# Patient Record
Sex: FEMALE | Race: WHITE | NOT HISPANIC OR LATINO | URBAN - METROPOLITAN AREA
[De-identification: names, ages, dates, MRNs, and addresses within clinical notes are randomized per-mention and may not be internally consistent; named-entity substitution may affect disease eponyms.]

---

## 2018-02-14 ENCOUNTER — INPATIENT (INPATIENT)
Facility: HOSPITAL | Age: 58
LOS: 6 days | Discharge: HOME | End: 2018-02-21
Attending: HOSPITALIST

## 2018-02-14 VITALS
HEART RATE: 84 BPM | DIASTOLIC BLOOD PRESSURE: 60 MMHG | TEMPERATURE: 99 F | RESPIRATION RATE: 18 BRPM | SYSTOLIC BLOOD PRESSURE: 110 MMHG

## 2018-02-15 DIAGNOSIS — Z98.890 OTHER SPECIFIED POSTPROCEDURAL STATES: Chronic | ICD-10-CM

## 2018-02-15 DIAGNOSIS — K52.9 NONINFECTIVE GASTROENTERITIS AND COLITIS, UNSPECIFIED: ICD-10-CM

## 2018-02-15 DIAGNOSIS — D69.6 THROMBOCYTOPENIA, UNSPECIFIED: ICD-10-CM

## 2018-02-15 DIAGNOSIS — K74.60 UNSPECIFIED CIRRHOSIS OF LIVER: ICD-10-CM

## 2018-02-15 LAB
ALBUMIN SERPL ELPH-MCNC: 2.9 G/DL — LOW (ref 3–5.5)
ALP SERPL-CCNC: 79 U/L — SIGNIFICANT CHANGE UP (ref 30–115)
ALT FLD-CCNC: 31 U/L — SIGNIFICANT CHANGE UP (ref 0–41)
ANION GAP SERPL CALC-SCNC: 5 MMOL/L — LOW (ref 7–14)
APTT BLD: 37.3 SEC — SIGNIFICANT CHANGE UP (ref 27–39.2)
AST SERPL-CCNC: 62 U/L — HIGH (ref 0–41)
BILIRUB SERPL-MCNC: 1.3 MG/DL — HIGH (ref 0.2–1.2)
BUN SERPL-MCNC: 8 MG/DL — LOW (ref 10–20)
CALCIUM SERPL-MCNC: 8.2 MG/DL — LOW (ref 8.5–10.1)
CHLORIDE SERPL-SCNC: 108 MMOL/L — SIGNIFICANT CHANGE UP (ref 98–110)
CO2 SERPL-SCNC: 20 MMOL/L — SIGNIFICANT CHANGE UP (ref 17–32)
CREAT SERPL-MCNC: 0.7 MG/DL — SIGNIFICANT CHANGE UP (ref 0.7–1.5)
D DIMER BLD IA.RAPID-MCNC: 541 NG/ML DDU — HIGH (ref 0–230)
FIBRINOGEN PPP-MCNC: 201 MG/DL — LOW (ref 204.4–570.6)
GLUCOSE SERPL-MCNC: 95 MG/DL — SIGNIFICANT CHANGE UP (ref 70–110)
HCT VFR BLD CALC: 28.2 % — LOW (ref 37–47)
HCT VFR BLD CALC: 30.2 % — LOW (ref 37–47)
HGB BLD-MCNC: 8.7 G/DL — LOW (ref 14–18)
HGB BLD-MCNC: 9.4 G/DL — LOW (ref 14–18)
INR BLD: 1.65 RATIO — HIGH (ref 0.65–1.3)
LIDOCAIN IGE QN: 66 U/L — HIGH (ref 7–60)
MCHC RBC-ENTMCNC: 28.5 PG — SIGNIFICANT CHANGE UP (ref 27–31)
MCHC RBC-ENTMCNC: 28.7 PG — SIGNIFICANT CHANGE UP (ref 27–31)
MCHC RBC-ENTMCNC: 30.9 G/DL — LOW (ref 32–37)
MCHC RBC-ENTMCNC: 31.1 G/DL — LOW (ref 32–37)
MCV RBC AUTO: 91.5 FL — HIGH (ref 81–91)
MCV RBC AUTO: 93.1 FL — HIGH (ref 81–91)
NRBC # BLD: 0 /100 WBCS — SIGNIFICANT CHANGE UP (ref 0–0)
NRBC # BLD: 0 /100 WBCS — SIGNIFICANT CHANGE UP (ref 0–0)
PLATELET # BLD AUTO: 14 K/UL — CRITICAL LOW (ref 130–400)
PLATELET # BLD AUTO: 24 K/UL — LOW (ref 130–400)
POTASSIUM SERPL-MCNC: 3.8 MMOL/L — SIGNIFICANT CHANGE UP (ref 3.5–5)
POTASSIUM SERPL-SCNC: 3.8 MMOL/L — SIGNIFICANT CHANGE UP (ref 3.5–5)
PROT SERPL-MCNC: 9.8 G/DL — HIGH (ref 6–8)
PROTHROM AB SERPL-ACNC: 18 SEC — HIGH (ref 9.95–12.87)
RBC # BLD: 3.03 M/UL — LOW (ref 4.2–5.4)
RBC # BLD: 3.3 M/UL — LOW (ref 4.2–5.4)
RBC # FLD: 18.4 % — HIGH (ref 11.5–14.5)
RBC # FLD: 18.4 % — HIGH (ref 11.5–14.5)
SODIUM SERPL-SCNC: 133 MMOL/L — LOW (ref 135–146)
TYPE + AB SCN PNL BLD: SIGNIFICANT CHANGE UP
WBC # BLD: 2.36 K/UL — LOW (ref 4.8–10.8)
WBC # BLD: 3.36 K/UL — LOW (ref 4.8–10.8)
WBC # FLD AUTO: 2.36 K/UL — LOW (ref 4.8–10.8)
WBC # FLD AUTO: 3.36 K/UL — LOW (ref 4.8–10.8)

## 2018-02-15 RX ORDER — CIPROFLOXACIN LACTATE 400MG/40ML
400 VIAL (ML) INTRAVENOUS EVERY 12 HOURS
Qty: 0 | Refills: 0 | Status: DISCONTINUED | OUTPATIENT
Start: 2018-02-15 | End: 2018-02-15

## 2018-02-15 RX ORDER — CIPROFLOXACIN LACTATE 400MG/40ML
400 VIAL (ML) INTRAVENOUS ONCE
Qty: 0 | Refills: 0 | Status: DISCONTINUED | OUTPATIENT
Start: 2018-02-15 | End: 2018-02-15

## 2018-02-15 RX ORDER — METRONIDAZOLE 500 MG
TABLET ORAL
Qty: 0 | Refills: 0 | Status: DISCONTINUED | OUTPATIENT
Start: 2018-02-15 | End: 2018-02-20

## 2018-02-15 RX ORDER — URSODIOL 250 MG/1
1 TABLET, FILM COATED ORAL
Qty: 0 | Refills: 0 | COMMUNITY

## 2018-02-15 RX ORDER — URSODIOL 250 MG/1
300 TABLET, FILM COATED ORAL
Qty: 0 | Refills: 0 | Status: DISCONTINUED | OUTPATIENT
Start: 2018-02-15 | End: 2018-02-21

## 2018-02-15 RX ORDER — MORPHINE SULFATE 50 MG/1
4 CAPSULE, EXTENDED RELEASE ORAL
Qty: 0 | Refills: 0 | Status: DISCONTINUED | OUTPATIENT
Start: 2018-02-15 | End: 2018-02-16

## 2018-02-15 RX ORDER — CIPROFLOXACIN LACTATE 400MG/40ML
VIAL (ML) INTRAVENOUS
Qty: 0 | Refills: 0 | Status: DISCONTINUED | OUTPATIENT
Start: 2018-02-15 | End: 2018-02-15

## 2018-02-15 RX ORDER — METRONIDAZOLE 500 MG
500 TABLET ORAL EVERY 8 HOURS
Qty: 0 | Refills: 0 | Status: DISCONTINUED | OUTPATIENT
Start: 2018-02-15 | End: 2018-02-20

## 2018-02-15 RX ORDER — METRONIDAZOLE 500 MG
500 TABLET ORAL ONCE
Qty: 0 | Refills: 0 | Status: COMPLETED | OUTPATIENT
Start: 2018-02-15 | End: 2018-02-15

## 2018-02-15 RX ORDER — SODIUM CHLORIDE 9 MG/ML
1000 INJECTION INTRAMUSCULAR; INTRAVENOUS; SUBCUTANEOUS ONCE
Qty: 0 | Refills: 0 | Status: COMPLETED | OUTPATIENT
Start: 2018-02-15 | End: 2018-02-15

## 2018-02-15 RX ORDER — MORPHINE SULFATE 50 MG/1
4 CAPSULE, EXTENDED RELEASE ORAL ONCE
Qty: 0 | Refills: 0 | Status: DISCONTINUED | OUTPATIENT
Start: 2018-02-15 | End: 2018-02-15

## 2018-02-15 RX ORDER — CIPROFLOXACIN LACTATE 400MG/40ML
400 VIAL (ML) INTRAVENOUS ONCE
Qty: 0 | Refills: 0 | Status: COMPLETED | OUTPATIENT
Start: 2018-02-15 | End: 2018-02-15

## 2018-02-15 RX ORDER — CIPROFLOXACIN LACTATE 400MG/40ML
VIAL (ML) INTRAVENOUS
Qty: 0 | Refills: 0 | Status: DISCONTINUED | OUTPATIENT
Start: 2018-02-15 | End: 2018-02-20

## 2018-02-15 RX ORDER — PROPRANOLOL HCL 160 MG
20 CAPSULE, EXTENDED RELEASE 24HR ORAL
Qty: 0 | Refills: 0 | Status: DISCONTINUED | OUTPATIENT
Start: 2018-02-15 | End: 2018-02-21

## 2018-02-15 RX ORDER — CIPROFLOXACIN LACTATE 400MG/40ML
400 VIAL (ML) INTRAVENOUS EVERY 12 HOURS
Qty: 0 | Refills: 0 | Status: DISCONTINUED | OUTPATIENT
Start: 2018-02-15 | End: 2018-02-20

## 2018-02-15 RX ADMIN — URSODIOL 300 MILLIGRAM(S): 250 TABLET, FILM COATED ORAL at 18:26

## 2018-02-15 RX ADMIN — Medication 200 MILLIGRAM(S): at 18:26

## 2018-02-15 RX ADMIN — MORPHINE SULFATE 4 MILLIGRAM(S): 50 CAPSULE, EXTENDED RELEASE ORAL at 03:06

## 2018-02-15 RX ADMIN — MORPHINE SULFATE 4 MILLIGRAM(S): 50 CAPSULE, EXTENDED RELEASE ORAL at 02:04

## 2018-02-15 RX ADMIN — Medication 100 MILLIGRAM(S): at 12:19

## 2018-02-15 RX ADMIN — Medication 20 MILLIGRAM(S): at 18:27

## 2018-02-15 RX ADMIN — SODIUM CHLORIDE 1000 MILLILITER(S): 9 INJECTION INTRAMUSCULAR; INTRAVENOUS; SUBCUTANEOUS at 02:05

## 2018-02-15 RX ADMIN — Medication 200 MILLIGRAM(S): at 12:18

## 2018-02-15 NOTE — ED PROVIDER NOTE - MEDICAL DECISION MAKING DETAILS
Patient's abd pain improved, tenderness previously described and imaging studies likely indicate hepatomegaly is cause of pain, otherwise unknown. Thrombocytopenia, critical. Will admit with likely initiation of transfusion of platelets in the ED.

## 2018-02-15 NOTE — H&P ADULT - NSHPPHYSICALEXAM_GEN_ALL_CORE
Vital Signs Last 24 Hrs  T(C): 36.4 (15 Feb 2018 08:43), Max: 37.4 (14 Feb 2018 22:17)  T(F): 97.5 (15 Feb 2018 08:43), Max: 99.4 (14 Feb 2018 22:17)  HR: 56 (15 Feb 2018 08:43) (56 - 84)  BP: 98/58 (15 Feb 2018 08:43) (98/58 - 110/60)  BP(mean): --  RR: 20 (15 Feb 2018 08:43) (18 - 20)  SpO2: 98% (15 Feb 2018 08:43) (98% - 98%)

## 2018-02-15 NOTE — H&P ADULT - HISTORY OF PRESENT ILLNESS
56yo Female, poor historia, has PMH of cirrhosis 2/2 Hep C (s/p treatment), follow dr. Ortiz, hepatologist, here for LUQ pain. Pt has chronic abdominal pain, mostly RUQ and LUQ, was told that it's due to cirrhosis and splenomegaly. Yesterday, pt had LUQ worse than her baseline. No fever, chill, n/v/d. Had normal BM, no sick contact or abnormal diet. In ED, pt had US and CT of abd done. Her clinical symptoms does not seems to related to biliary system. However, she was found to have worsening thrombocytopenia. Pt did not have doctor follow up for one year, no recent blood work to compared to, last blood work in 2016. Currently pt is symptom free, stable. 56yo Female, poor historia, has PMH of cirrhosis 2/2 Hep C (s/p treatment), follow dr. Ortiz, hepatologist, here for LUQ pain. Pt has chronic abdominal pain, mostly RUQ and LUQ, was told that it's due to cirrhosis and splenomegaly. Yesterday, pt had LUQ worse than her baseline. No fever, chill, n/v/d. Had normal BM, no sick contact or abnormal diet. In ED, pt had US and CT of abd done, which showed cholelithiasis and cecitis. Her clinical symptoms does not seems to related to biliary system. However, she was found to have worsening thrombocytopenia. Pt did not have doctor follow up for one year, no recent blood work to compared to, last blood work in 2016. Currently pt is symptom free, stable.

## 2018-02-15 NOTE — H&P ADULT - ASSESSMENT
56yo Female, poor historia, has PMH of cirrhosis 2/2 Hep C (s/p treatment), follow dr. Ortiz, hepatologist, here for LUQ and RUQ pain since yesterday, found to have cecitis and thrombocytopenia likely 58yo Female, poor historia, has PMH of cirrhosis 2/2 Hep C (s/p treatment), follow dr. Ortiz, hepatologist, here for LUQ and RUQ pain since yesterday, found to have cecitis and thrombocytopenia likely chronic.

## 2018-02-15 NOTE — H&P ADULT - PROBLEM SELECTOR PLAN 3
- follows up Dr. Ortiz outpt  - abd pain less likely related to acute biliary disease, pain likely due to splenomegaly/hepatomegaly  - pain control with morphine    DVT ppx- scd, pt has thrombocytopenia

## 2018-02-15 NOTE — ED PROVIDER NOTE - CARE PLAN
Principal Discharge DX:	Abdominal pain  Secondary Diagnosis:	Liver cirrhosis  Secondary Diagnosis:	Splenomegaly Principal Discharge DX:	Thrombocytopenia  Secondary Diagnosis:	Liver cirrhosis  Secondary Diagnosis:	Splenomegaly

## 2018-02-15 NOTE — ED PROVIDER NOTE - OBJECTIVE STATEMENT
Pt is a 58 y/o female with hx of cirrhosis, HTN, who sees Dr. Ortiz, hepatologist, who presents to ED for chronic LUQ abd pain for years, intermittent, worse since onset. Pt states Dr. Ortiz stated sx's due to splenomegaly but patient has not been able to see physician due to insurance reasons but has appointment this month. No n/v/d, fever, cough, urinary complaints.

## 2018-02-15 NOTE — H&P ADULT - ATTENDING COMMENTS
Pt seen and evaluated independently.  Son at bedside and translated.  Pt now feels well and denies N/V, abdominal pain.  She ate breakfast.  She does have epistaxis at times per son.  Last platelet count in 12/2016 was 88 and now it is in the 20 range.  Check peripheral smear, ddimer, fibrinogen. Obtain Heme Onc consult - need to rule out other causes of her worsening thrombocytopenia in a pt with known splenomegaly and Hep C s/p therapy. Transfuse platelets for bleeding or platelet count < 20.  Continue IV abx for cecitis.   I reviewed the resident's note and I agree with the physical, exam, assessment and plan with additions as above.

## 2018-02-15 NOTE — ED PROVIDER NOTE - PHYSICAL EXAMINATION
Constitutional: Well developed, well nourished. Uncomfortable.  Head: Normocephalic, atraumatic.  Eyes: PERRL. EOMI.  ENT: No nasal discharge. Mucous membranes dry.  Neck: Supple. Painless ROM.  Cardiovascular: Normal S1, S2. Regular rate and rhythm. No murmurs, rubs, or gallops.  Pulmonary: Normal respiratory rate and effort. Lungs clear to auscultation bilaterally. No wheezing, rales, or rhonchi.  Abdominal: Soft. Nondistended. + LUQ tenderness. No rebound, guarding, rigidity.  Extremities. Pelvis stable. No lower extremity edema, symmetric calves.  Skin: No rashes, cyanosis.  Neuro: AAOx3. No focal neurological deficits.  Psych: Normal mood. Normal affect. Constitutional: Well developed, well nourished. Uncomfortable.  Head: Normocephalic, atraumatic.  Eyes: PERRL. EOMI.  ENT: No nasal discharge. Mucous membranes dry.  Neck: Supple. Painless ROM.  Cardiovascular: Normal S1, S2. Regular rate and rhythm. No murmurs, rubs, or gallops.  Pulmonary: Normal respiratory rate and effort. Lungs clear to auscultation bilaterally. No wheezing, rales, or rhonchi.  Abdominal: Soft. Nondistended. + LUQ tenderness, mild RUQ tenderness. No rebound, guarding, rigidity.  Extremities. Pelvis stable. No lower extremity edema, symmetric calves.  Skin: No rashes, cyanosis.  Neuro: AAOx3. No focal neurological deficits.  Psych: Normal mood. Normal affect.

## 2018-02-15 NOTE — ED PROVIDER NOTE - ATTENDING CONTRIBUTION TO CARE
I personally evaluated the patient. I reviewed the Resident’s or Physician Assistant’s note (as assigned above), and agree with the findings and plan except as documented in my note.  57 yr F with hx of liver cirrhosis, splenomegaly, pancytopenia, see hepatologist Dr. Ortiz ( Has an appt in 1 week) presents with 1 week of worsening epigastric abd pain. No associated f/c, n/v. Had an interruption with insurance and therefore has not been f/up with her doctor. Denies sCP, SOB. VS reviewed, pt non toxic with but with discomfort, NAD. Head ncat, neck supple w/o ttp. B/l TM wnl. normal s1s2 without any murmurs, Lungs CTAB with normal work of breathing. abd +BS, s/nd, + generalized ttp worse in the epigastrium and LUQ, extremities wnl, neuro exam grossly normal. No acute skin rashes. Plan is labs, pain control, abd imaging abd reasses.

## 2018-02-15 NOTE — ED PROVIDER NOTE - NS ED ROS FT
Constitutional: No fever, chills.  Eyes: No visual changes.  ENT: No hearing changes. No sore throat.  Neck: No neck pain or stiffness.  Cardiovascular: No chest pain, palpitations, edema.  Pulmonary: No SOB, cough. No hemoptysis.  Abdominal: + abdominal pain. No nausea, vomiting, diarrhea.  : No dysuria, frequency.  Neuro: No headache, syncope, dizziness.  MS: No back pain. No calf pain/swelling.  Psych: No suicidal ideations.

## 2018-02-15 NOTE — H&P ADULT - NSHPLABSRESULTS_GEN_ALL_CORE
8.7    2.36  )-----------( 14       ( 15 Feb 2018 07:13 )             28.2   02-15    133<L>  |  108  |  8<L>  ----------------------------<  95  3.8   |  20  |  0.7    Ca    8.2<L>      15 Feb 2018 02:02    TPro  9.8<H>  /  Alb  2.9<L>  /  TBili  1.3<H>  /  DBili  x   /  AST  62<H>  /  ALT  31  /  AlkPhos  79  02-15  PT/INR - ( 15 Feb 2018 02:02 )   PT: 18.00 sec;   INR: 1.65 ratio         PTT - ( 15 Feb 2018 02:02 )  PTT:37.3 sec 8.7    2.36  )-----------( 14       ( 15 Feb 2018 07:13 )             28.2   02-15    133<L>  |  108  |  8<L>  ----------------------------<  95  3.8   |  20  |  0.7    Ca    8.2<L>      15 Feb 2018 02:02    TPro  9.8<H>  /  Alb  2.9<L>  /  TBili  1.3<H>  /  DBili  x   /  AST  62<H>  /  ALT  31  /  AlkPhos  79  02-15  PT/INR - ( 15 Feb 2018 02:02 )   PT: 18.00 sec;   INR: 1.65 ratio         PTT - ( 15 Feb 2018 02:02 )  PTT:37.3 sec    US abd  1.  Cirrhosis and splenomegaly.      2.  Cholelithiasis, sludge without sonographic evidence for cholecystitis.        CTa/p    IMPRESSION:          1. Common bile duct dilatation up to 0.9 cm without evidence of  choledocholithiasis. In the setting of elevated lipase this may be sequelae of a  passed choledocholith.      2. Splenomegaly measuring up to 20.6 cm in craniocaudal dimension, not  significantly changed since prior CT June 25, 2015.      3. Nodular contour of the liver suspicious for cirrhosis.      4. Nonspecific small amount of fluid in the pelvis and along the right  paracolic gutter.

## 2018-02-15 NOTE — H&P ADULT - PROBLEM SELECTOR PLAN 1
- likely chronic, related to liver cirrhosis +/- cecitis, less likely DIC, no spontaneous bleeding  - check d-dimer and fibrinogen  - check cbc

## 2018-02-15 NOTE — ED PROVIDER NOTE - PROGRESS NOTE DETAILS
Results reviewed. Discussed with surgery, will see pt Endorsed to Dr. Perales Gave signout to Dr. Bucio to f/up with surgery, pt's clinical status and dispo accordingly. Abd pain resolved. Imaging reviewed. Presentation not consistent with cholecyctitis. Case discussed with Dr. Schneider and Mundo (MAR). Dr. Ortiz from GI paged. Patient admitted for pancytopenia.

## 2018-02-16 LAB
ALBUMIN SERPL ELPH-MCNC: 2.5 G/DL — LOW (ref 3–5.5)
ALP SERPL-CCNC: 74 U/L — SIGNIFICANT CHANGE UP (ref 30–115)
ALT FLD-CCNC: 26 U/L — SIGNIFICANT CHANGE UP (ref 0–41)
ANION GAP SERPL CALC-SCNC: 6 MMOL/L — LOW (ref 7–14)
APTT BLD: 36.3 SEC — SIGNIFICANT CHANGE UP (ref 27–39.2)
AST SERPL-CCNC: 52 U/L — HIGH (ref 0–41)
BASOPHILS # BLD AUTO: 0.01 K/UL — SIGNIFICANT CHANGE UP (ref 0–0.2)
BASOPHILS NFR BLD AUTO: 0.5 % — SIGNIFICANT CHANGE UP (ref 0–1)
BILIRUB SERPL-MCNC: 1 MG/DL — SIGNIFICANT CHANGE UP (ref 0.2–1.2)
BUN SERPL-MCNC: 9 MG/DL — LOW (ref 10–20)
CALCIUM SERPL-MCNC: 8.2 MG/DL — LOW (ref 8.5–10.1)
CHLORIDE SERPL-SCNC: 111 MMOL/L — HIGH (ref 98–110)
CO2 SERPL-SCNC: 20 MMOL/L — SIGNIFICANT CHANGE UP (ref 17–32)
CREAT SERPL-MCNC: 0.7 MG/DL — SIGNIFICANT CHANGE UP (ref 0.7–1.5)
EOSINOPHIL # BLD AUTO: 0.07 K/UL — SIGNIFICANT CHANGE UP (ref 0–0.7)
EOSINOPHIL NFR BLD AUTO: 3.4 % — SIGNIFICANT CHANGE UP (ref 0–8)
GLUCOSE SERPL-MCNC: 89 MG/DL — SIGNIFICANT CHANGE UP (ref 70–110)
HCT VFR BLD CALC: 28.8 % — LOW (ref 37–47)
HGB BLD-MCNC: 8.8 G/DL — LOW (ref 14–18)
IMM GRANULOCYTES NFR BLD AUTO: 0.5 % — HIGH (ref 0.1–0.3)
INR BLD: 1.7 RATIO — HIGH (ref 0.65–1.3)
LACTATE SERPL-SCNC: 1.4 MMOL/L — SIGNIFICANT CHANGE UP (ref 0.5–2.2)
LYMPHOCYTES # BLD AUTO: 0.61 K/UL — LOW (ref 1.2–3.4)
LYMPHOCYTES # BLD AUTO: 29.3 % — SIGNIFICANT CHANGE UP (ref 20.5–51.1)
MCHC RBC-ENTMCNC: 28.2 PG — SIGNIFICANT CHANGE UP (ref 27–31)
MCHC RBC-ENTMCNC: 30.6 G/DL — LOW (ref 32–37)
MCV RBC AUTO: 92.3 FL — HIGH (ref 81–91)
MONOCYTES # BLD AUTO: 0.29 K/UL — SIGNIFICANT CHANGE UP (ref 0.1–0.6)
MONOCYTES NFR BLD AUTO: 13.9 % — HIGH (ref 1.7–9.3)
NEUTROPHILS # BLD AUTO: 1.09 K/UL — LOW (ref 1.4–6.5)
NEUTROPHILS NFR BLD AUTO: 52.4 % — SIGNIFICANT CHANGE UP (ref 42.2–75.2)
PLATELET # BLD AUTO: 23 K/UL — LOW (ref 130–400)
POTASSIUM SERPL-MCNC: 4.2 MMOL/L — SIGNIFICANT CHANGE UP (ref 3.5–5)
POTASSIUM SERPL-SCNC: 4.2 MMOL/L — SIGNIFICANT CHANGE UP (ref 3.5–5)
PROT SERPL-MCNC: 8.9 G/DL — HIGH (ref 6–8)
PROTHROM AB SERPL-ACNC: 18.6 SEC — HIGH (ref 9.95–12.87)
RBC # BLD: 3.12 M/UL — LOW (ref 4.2–5.4)
RBC # FLD: 18.2 % — HIGH (ref 11.5–14.5)
SODIUM SERPL-SCNC: 137 MMOL/L — SIGNIFICANT CHANGE UP (ref 135–146)
WBC # BLD: 2.08 K/UL — LOW (ref 4.8–10.8)
WBC # FLD AUTO: 2.08 K/UL — LOW (ref 4.8–10.8)

## 2018-02-16 RX ORDER — INFLUENZA VIRUS VACCINE 15; 15; 15; 15 UG/.5ML; UG/.5ML; UG/.5ML; UG/.5ML
0.5 SUSPENSION INTRAMUSCULAR ONCE
Qty: 0 | Refills: 0 | Status: COMPLETED | OUTPATIENT
Start: 2018-02-16 | End: 2018-02-21

## 2018-02-16 RX ADMIN — Medication 100 MILLIGRAM(S): at 00:12

## 2018-02-16 RX ADMIN — Medication 100 MILLIGRAM(S): at 06:22

## 2018-02-16 RX ADMIN — Medication 100 MILLIGRAM(S): at 22:49

## 2018-02-16 RX ADMIN — Medication 100 MILLIGRAM(S): at 13:28

## 2018-02-16 RX ADMIN — Medication 20 MILLIGRAM(S): at 18:35

## 2018-02-16 RX ADMIN — Medication 200 MILLIGRAM(S): at 05:36

## 2018-02-16 RX ADMIN — URSODIOL 300 MILLIGRAM(S): 250 TABLET, FILM COATED ORAL at 18:35

## 2018-02-16 RX ADMIN — URSODIOL 300 MILLIGRAM(S): 250 TABLET, FILM COATED ORAL at 10:08

## 2018-02-16 RX ADMIN — URSODIOL 300 MILLIGRAM(S): 250 TABLET, FILM COATED ORAL at 12:43

## 2018-02-16 RX ADMIN — Medication 200 MILLIGRAM(S): at 18:35

## 2018-02-17 DIAGNOSIS — R16.1 SPLENOMEGALY, NOT ELSEWHERE CLASSIFIED: ICD-10-CM

## 2018-02-17 DIAGNOSIS — D68.9 COAGULATION DEFECT, UNSPECIFIED: ICD-10-CM

## 2018-02-17 DIAGNOSIS — D61.818 OTHER PANCYTOPENIA: ICD-10-CM

## 2018-02-17 DIAGNOSIS — K72.10 CHRONIC HEPATIC FAILURE WITHOUT COMA: ICD-10-CM

## 2018-02-17 LAB
HCT VFR BLD CALC: 25.9 % — LOW (ref 37–47)
HGB BLD-MCNC: 8 G/DL — LOW (ref 14–18)
MCHC RBC-ENTMCNC: 29.3 PG — SIGNIFICANT CHANGE UP (ref 27–31)
MCHC RBC-ENTMCNC: 30.9 G/DL — LOW (ref 32–37)
MCV RBC AUTO: 94.9 FL — HIGH (ref 81–91)
NRBC # BLD: 0 /100 WBCS — SIGNIFICANT CHANGE UP (ref 0–0)
PLATELET # BLD AUTO: 17 K/UL — CRITICAL LOW (ref 130–400)
RBC # BLD: 2.73 M/UL — LOW (ref 4.2–5.4)
RBC # FLD: 18.4 % — HIGH (ref 11.5–14.5)
WBC # BLD: 1.89 K/UL — LOW (ref 4.8–10.8)
WBC # FLD AUTO: 1.89 K/UL — LOW (ref 4.8–10.8)

## 2018-02-17 RX ADMIN — URSODIOL 300 MILLIGRAM(S): 250 TABLET, FILM COATED ORAL at 08:15

## 2018-02-17 RX ADMIN — URSODIOL 300 MILLIGRAM(S): 250 TABLET, FILM COATED ORAL at 12:28

## 2018-02-17 RX ADMIN — Medication 100 MILLIGRAM(S): at 05:54

## 2018-02-17 RX ADMIN — URSODIOL 300 MILLIGRAM(S): 250 TABLET, FILM COATED ORAL at 17:33

## 2018-02-17 RX ADMIN — Medication 200 MILLIGRAM(S): at 17:33

## 2018-02-17 RX ADMIN — Medication 100 MILLIGRAM(S): at 21:38

## 2018-02-17 RX ADMIN — Medication 100 MILLIGRAM(S): at 14:41

## 2018-02-17 RX ADMIN — Medication 200 MILLIGRAM(S): at 05:54

## 2018-02-17 NOTE — CONSULT NOTE ADULT - ATTENDING COMMENTS
The patient was seen and examined. Agree with above.  58 yo female with h/o Hep C liver cirrhosis, splenomegaly and portal hypertension, admitted for abdominal pain. CBC revealed worsening of pancytopenia and thrombocytopenia, most likely due her h/o liver cirrhosis.    Recommendation:  1. Supportive care. Transfuse PLT if indicated or signs of bleeding.  2. Need her medical record to know her baseline CBC and if she is currently in antiviral treatment which may decrease her blood counts.  3. Followup with GI for cecitis.

## 2018-02-17 NOTE — CONSULT NOTE ADULT - SUBJECTIVE AND OBJECTIVE BOX
Patient is a 57y old  Female who presents with a chief complaint of LUQ pain (15 Feb 2018 10:36)      HPI:  58yo Female, poor historian, has PMH of cirrhosis 2/2 Hep C (s/p treatment), follow dr. Ortiz, hepatologist, here for LUQ pain. Pt has chronic abdominal pain, mostly RUQ and LUQ, was told that it's due to cirrhosis and splenomegaly. Yesterday, pt had LUQ worse than her baseline. No fever, chill, n/v/d. Had normal BM, no sick contact or abnormal diet. In ED, pt had US and CT of abd done, which showed cholelithiasis and cecitis. Her clinical symptoms does not seems to related to biliary system. However, she was found to have worsening thrombocytopenia. Pt did not have doctor follow up for one year, no recent blood work to compared to, last blood work in 2016. Currently pt is symptom free, stable. (15 Feb 2018 10:36)       PAST MEDICAL & SURGICAL HISTORY:  Cirrhosis of liver without ascites, unspecified hepatic cirrhosis type  HTN (hypertension)  History of appendectomy  History of tonsillectomy      MEDICATIONS  (STANDING):  ciprofloxacin   IVPB 400 milliGRAM(s) IV Intermittent every 12 hours  ciprofloxacin   IVPB      influenza   Vaccine 0.5 milliLiter(s) IntraMuscular once  metroNIDAZOLE  IVPB 500 milliGRAM(s) IV Intermittent every 8 hours  metroNIDAZOLE  IVPB      propranolol 20 milliGRAM(s) Oral two times a day  ursodiol Capsule 300 milliGRAM(s) Oral three times a day with meals    MEDICATIONS  (PRN):      Allergies    No Known Allergies    Intolerances        Vital Signs Last 24 Hrs  T(C): 36 (17 Feb 2018 05:13), Max: 36 (17 Feb 2018 05:13)  T(F): 96.8 (17 Feb 2018 05:13), Max: 96.8 (17 Feb 2018 05:13)  HR: 56 (17 Feb 2018 05:13) (56 - 61)  BP: 86/47 (17 Feb 2018 05:13) (86/47 - 118/64)  BP(mean): --  RR: 18 (17 Feb 2018 05:13) (18 - 19)  SpO2: 97% (16 Feb 2018 16:35) (97% - 97%)    PHYSICAL EXAM  General: adult in NAD  HEENT: clear oropharynx, anicteric sclera, pink conjunctiva  Neck: supple  CV: normal S1/S2 with no murmur rubs or gallops  Lungs: positive air movement b/l ant lungs,clear to auscultation, no wheezes, no rales  Abdomen: soft non-tender non-distended, no hepatosplenomegaly  Ext: no clubbing cyanosis or edema  Skin: no rashes and no petechiae  Neuro: alert and oriented X 4, no focal deficits      LABS:                          8.0    1.89  )-----------( 17       ( 17 Feb 2018 07:12 )             25.9         Mean Cell Volume : 94.9 fL  Mean Cell Hemoglobin : 29.3 pg  Mean Cell Hemoglobin Concentration : 30.9 g/dL  Auto Neutrophil # : x  Auto Lymphocyte # : x  Auto Monocyte # : x  Auto Eosinophil # : x  Auto Basophil # : x  Auto Neutrophil % : x  Auto Lymphocyte % : x  Auto Monocyte % : x  Auto Eosinophil % : x  Auto Basophil % : x      Serial CBC's  02-17 @ 07:12  Hct-25.9 / Hgb-8.0 / Plat-17 / RBC-2.73 / WBC-1.89  Serial CBC's  02-16 @ 08:35  Hct-28.8 / Hgb-8.8 / Plat-23 / RBC-3.12 / WBC-2.08  Serial CBC's  02-15 @ 07:13  Hct-28.2 / Hgb-8.7 / Plat-14 / RBC-3.03 / WBC-2.36  Serial CBC's  02-15 @ 02:02  Hct-30.2 / Hgb-9.4 / Plat-24 / RBC-3.30 / WBC-3.36      02-16    137  |  111<H>  |  9<L>  ----------------------------<  89  4.2   |  20  |  0.7    Ca    8.2<L>      16 Feb 2018 08:35    TPro  8.9<H>  /  Alb  2.5<L>  /  TBili  1.0  /  DBili  x   /  AST  52<H>  /  ALT  26  /  AlkPhos  74  02-16      PT/INR - ( 16 Feb 2018 08:35 )   PT: 18.60 sec;   INR: 1.70 ratio         PTT - ( 16 Feb 2018 08:35 )  PTT:36.3 sec                BLOOD SMEAR INTERPRETATION:       RADIOLOGY & ADDITIONAL STUDIES: Patient is a 57y old  Female who presents with a chief complaint of LUQ pain (15 Feb 2018 10:36)      HPI:  56yo Female, poor historian, has PMH of cirrhosis 2/2 Hep C (s/p treatment), follow dr. Ortiz, hepatologist, here for LUQ pain. Pt has chronic abdominal pain, mostly RUQ and LUQ, was told that it's due to cirrhosis and splenomegaly. Yesterday, pt had LUQ worse than her baseline. No fever, chill, n/v/d. Had normal BM, no sick contact or abnormal diet. In ED, pt had US and CT of abd done, which showed cholelithiasis and cecitis. Her clinical symptoms does not seems to related to biliary system. However, she was found to have worsening thrombocytopenia. Pt did not have doctor follow up for one year, no recent blood work to compared to, last blood work in 2016. Currently pt is symptom free, stable.    As per the patient's son, the patient's normal platelet count is approximately 60-70, she has had no change in her medications and as per the son is not aware of any changes in the status of her cirrhosis. The patient denies any bleeding, no hematuria, melena, vaginal bleeding, gum bleeding, coffee ground emesis or hematemesis. The patient did have three nose bleeds last month but those have resolved.        PAST MEDICAL & SURGICAL HISTORY:  Cirrhosis of liver without ascites, unspecified hepatic cirrhosis type  HTN (hypertension)  History of appendectomy  History of tonsillectomy      MEDICATIONS  (STANDING):  ciprofloxacin   IVPB 400 milliGRAM(s) IV Intermittent every 12 hours  ciprofloxacin   IVPB      influenza   Vaccine 0.5 milliLiter(s) IntraMuscular once  metroNIDAZOLE  IVPB 500 milliGRAM(s) IV Intermittent every 8 hours  metroNIDAZOLE  IVPB      propranolol 20 milliGRAM(s) Oral two times a day  ursodiol Capsule 300 milliGRAM(s) Oral three times a day with meals    MEDICATIONS  (PRN):      Allergies    No Known Allergies    Intolerances        Vital Signs Last 24 Hrs  T(C): 36 (17 Feb 2018 05:13), Max: 36 (17 Feb 2018 05:13)  T(F): 96.8 (17 Feb 2018 05:13), Max: 96.8 (17 Feb 2018 05:13)  HR: 56 (17 Feb 2018 05:13) (56 - 61)  BP: 86/47 (17 Feb 2018 05:13) (86/47 - 118/64)  BP(mean): --  RR: 18 (17 Feb 2018 05:13) (18 - 19)  SpO2: 97% (16 Feb 2018 16:35) (97% - 97%)    PHYSICAL EXAM  General: adult in NAD  HEENT: clear oropharynx, anicteric sclera, pink conjunctiva  Neck: supple  CV: normal S1/S2 with no murmur rubs or gallops  Lungs: positive air movement b/l ant lungs,clear to auscultation, no wheezes, no rales  Abdomen: soft non-tender non-distended, no hepatosplenomegaly  Ext: no clubbing cyanosis or edema  Skin: no rashes and no petechiae  Neuro: alert and oriented X 4, no focal deficits      LABS:                          8.0    1.89  )-----------( 17       ( 17 Feb 2018 07:12 )             25.9         Mean Cell Volume : 94.9 fL  Mean Cell Hemoglobin : 29.3 pg  Mean Cell Hemoglobin Concentration : 30.9 g/dL  Auto Neutrophil # : x  Auto Lymphocyte # : x  Auto Monocyte # : x  Auto Eosinophil # : x  Auto Basophil # : x  Auto Neutrophil % : x  Auto Lymphocyte % : x  Auto Monocyte % : x  Auto Eosinophil % : x  Auto Basophil % : x      Serial CBC's  02-17 @ 07:12  Hct-25.9 / Hgb-8.0 / Plat-17 / RBC-2.73 / WBC-1.89  Serial CBC's  02-16 @ 08:35  Hct-28.8 / Hgb-8.8 / Plat-23 / RBC-3.12 / WBC-2.08  Serial CBC's  02-15 @ 07:13  Hct-28.2 / Hgb-8.7 / Plat-14 / RBC-3.03 / WBC-2.36  Serial CBC's  02-15 @ 02:02  Hct-30.2 / Hgb-9.4 / Plat-24 / RBC-3.30 / WBC-3.36      02-16    137  |  111<H>  |  9<L>  ----------------------------<  89  4.2   |  20  |  0.7    Ca    8.2<L>      16 Feb 2018 08:35    TPro  8.9<H>  /  Alb  2.5<L>  /  TBili  1.0  /  DBili  x   /  AST  52<H>  /  ALT  26  /  AlkPhos  74  02-16      PT/INR - ( 16 Feb 2018 08:35 )   PT: 18.60 sec;   INR: 1.70 ratio         PTT - ( 16 Feb 2018 08:35 )  PTT:36.3 sec                BLOOD SMEAR INTERPRETATION:       RADIOLOGY & ADDITIONAL STUDIES: HPI:  56yo Female, poor historian, has PMH of cirrhosis 2/2 Hep C (s/p treatment), follow dr. Ortiz, hepatologist, here for LUQ pain. Pt has chronic abdominal pain, mostly RUQ and LUQ, was told that it's due to cirrhosis and splenomegaly. Yesterday, pt had LUQ worse than her baseline. No fever, chill, n/v/d. Had normal BM, no sick contact or abnormal diet. In ED, pt had US and CT of abd done, which showed cholelithiasis and cecitis. Her clinical symptoms does not seems to related to biliary system. However, she was found to have worsening thrombocytopenia. Pt did not have doctor follow up for one year, no recent blood work to compared to, last blood work in 2016. Currently pt is symptom free, stable.    As per the patient's son, the patient's normal platelet count is approximately 60-70, she has had no change in her medications and as per the son is not aware of any changes in the status of her cirrhosis. The patient denies any bleeding, no hematuria, melena, vaginal bleeding, gum bleeding, coffee ground emesis or hematemesis. The patient did have three nose bleeds last month but those have resolved.        PAST MEDICAL & SURGICAL HISTORY:  Cirrhosis of liver without ascites, unspecified hepatic cirrhosis type  HTN (hypertension)  History of appendectomy  History of tonsillectomy      MEDICATIONS  (STANDING):  ciprofloxacin   IVPB 400 milliGRAM(s) IV Intermittent every 12 hours  ciprofloxacin   IVPB      influenza   Vaccine 0.5 milliLiter(s) IntraMuscular once  metroNIDAZOLE  IVPB 500 milliGRAM(s) IV Intermittent every 8 hours  metroNIDAZOLE  IVPB      propranolol 20 milliGRAM(s) Oral two times a day  ursodiol Capsule 300 milliGRAM(s) Oral three times a day with meals    MEDICATIONS  (PRN):      Allergies    No Known Allergies    Intolerances        Vital Signs Last 24 Hrs  T(C): 36 (17 Feb 2018 05:13), Max: 36 (17 Feb 2018 05:13)  T(F): 96.8 (17 Feb 2018 05:13), Max: 96.8 (17 Feb 2018 05:13)  HR: 56 (17 Feb 2018 05:13) (56 - 61)  BP: 86/47 (17 Feb 2018 05:13) (86/47 - 118/64)  BP(mean): --  RR: 18 (17 Feb 2018 05:13) (18 - 19)  SpO2: 97% (16 Feb 2018 16:35) (97% - 97%)    PHYSICAL EXAM  General: adult in NAD  CV: normal S1/S2 with no murmur rubs or gallops  Lungs: positive air movement b/l ant lungs,clear to auscultation,   Abdomen: soft non-tender non-distended, splenomegaly        LABS:                          8.0    1.89  )-----------( 17       ( 17 Feb 2018 07:12 )             25.9         Mean Cell Volume : 94.9 fL  Mean Cell Hemoglobin : 29.3 pg  Mean Cell Hemoglobin Concentration : 30.9 g/dL  Auto Neutrophil # : x  Auto Lymphocyte # : x  Auto Monocyte # : x  Auto Eosinophil # : x  Auto Basophil # : x  Auto Neutrophil % : x  Auto Lymphocyte % : x  Auto Monocyte % : x  Auto Eosinophil % : x  Auto Basophil % : x      Serial CBC's  02-17 @ 07:12  Hct-25.9 / Hgb-8.0 / Plat-17 / RBC-2.73 / WBC-1.89  Serial CBC's  02-16 @ 08:35  Hct-28.8 / Hgb-8.8 / Plat-23 / RBC-3.12 / WBC-2.08  Serial CBC's  02-15 @ 07:13  Hct-28.2 / Hgb-8.7 / Plat-14 / RBC-3.03 / WBC-2.36  Serial CBC's  02-15 @ 02:02  Hct-30.2 / Hgb-9.4 / Plat-24 / RBC-3.30 / WBC-3.36      02-16    137  |  111<H>  |  9<L>  ----------------------------<  89  4.2   |  20  |  0.7    Ca    8.2<L>      16 Feb 2018 08:35    TPro  8.9<H>  /  Alb  2.5<L>  /  TBili  1.0  /  DBili  x   /  AST  52<H>  /  ALT  26  /  AlkPhos  74  02-16      PT/INR - ( 16 Feb 2018 08:35 )   PT: 18.60 sec;   INR: 1.70 ratio         PTT - ( 16 Feb 2018 08:35 )  PTT:36.3 sec                BLOOD SMEAR INTERPRETATION:       RADIOLOGY & ADDITIONAL STUDIES:

## 2018-02-17 NOTE — PROGRESS NOTE ADULT - PROBLEM SELECTOR PLAN 1
Likely cirrhosis related, with splenic sequestration  Heme/onc c/s noted. Will try to obtain records  Transfuse if bleeding or below 20

## 2018-02-17 NOTE — PROGRESS NOTE ADULT - SUBJECTIVE AND OBJECTIVE BOX
Sister at bedside acting as Croatian     T(F): , Max: 96.8 (02-17-18 @ 05:13)  HR: 56 (02-17-18 @ 14:16) (56 - 58)  BP: 90/50 (02-17-18 @ 14:16)  RR: 18 (02-17-18 @ 14:16)  SpO2: --  IN: 300 mL / OUT: 0 mL / NET: 300 mL    General: No apparent distress  Cardiovascular: S1, S2  Gastrointestinal: Soft, Non-tender, Non-distended  Respiratory: Good air entry bilaterally  Musculoskeletal: Moves all extremities  Lymphatic: No edema  Neurologic: No gross motor deficit  Dermatologic: Skin dry  PT/INR - ( 16 Feb 2018 08:35 )   PT: 18.60 sec;   INR: 1.70 ratio      PTT - ( 16 Feb 2018 08:35 )  PTT:36.3 sec                        8.0    1.89  )-----------( 17       ( 17 Feb 2018 07:12 )             25.9     02-16    137  |  111<H>  |  9<L>  ----------------------------<  89  4.2   |  20  |  0.7    Ca    8.2<L>      16 Feb 2018 08:35    TPro  8.9<H>  /  Alb  2.5<L>  /  TBili  1.0  /  DBili  x   /  AST  52<H>  /  ALT  26  /  AlkPhos  74  02-16

## 2018-02-17 NOTE — CONSULT NOTE ADULT - ASSESSMENT
56yo F has PMH of cirrhosis 2/2 Hep C (s/p treatment), follow dr. Ortiz, hepatologist, here for LUQ pain. Found to have worsening thrombocytopenia

## 2018-02-18 LAB
ALBUMIN SERPL ELPH-MCNC: 2.5 G/DL — LOW (ref 3–5.5)
ALP SERPL-CCNC: 76 U/L — SIGNIFICANT CHANGE UP (ref 30–115)
ALT FLD-CCNC: 22 U/L — SIGNIFICANT CHANGE UP (ref 0–41)
ANION GAP SERPL CALC-SCNC: 6 MMOL/L — LOW (ref 7–14)
APTT BLD: 37 SEC — SIGNIFICANT CHANGE UP (ref 27–39.2)
AST SERPL-CCNC: 53 U/L — HIGH (ref 0–41)
BASOPHILS # BLD AUTO: 0.01 K/UL — SIGNIFICANT CHANGE UP (ref 0–0.2)
BASOPHILS NFR BLD AUTO: 0.6 % — SIGNIFICANT CHANGE UP (ref 0–1)
BILIRUB DIRECT SERPL-MCNC: 0.3 MG/DL — HIGH (ref 0–0.2)
BILIRUB INDIRECT FLD-MCNC: 0.7 MG/DL — SIGNIFICANT CHANGE UP
BILIRUB SERPL-MCNC: 1 MG/DL — SIGNIFICANT CHANGE UP (ref 0.2–1.2)
BUN SERPL-MCNC: 10 MG/DL — SIGNIFICANT CHANGE UP (ref 10–20)
CALCIUM SERPL-MCNC: 8 MG/DL — LOW (ref 8.5–10.1)
CHLORIDE SERPL-SCNC: 113 MMOL/L — HIGH (ref 98–110)
CO2 SERPL-SCNC: 19 MMOL/L — SIGNIFICANT CHANGE UP (ref 17–32)
CREAT SERPL-MCNC: 0.7 MG/DL — SIGNIFICANT CHANGE UP (ref 0.7–1.5)
EOSINOPHIL # BLD AUTO: 0.05 K/UL — SIGNIFICANT CHANGE UP (ref 0–0.7)
EOSINOPHIL NFR BLD AUTO: 2.8 % — SIGNIFICANT CHANGE UP (ref 0–8)
GLUCOSE SERPL-MCNC: 119 MG/DL — HIGH (ref 70–110)
HCT VFR BLD CALC: 26.7 % — LOW (ref 37–47)
HGB BLD-MCNC: 8.3 G/DL — LOW (ref 14–18)
IMM GRANULOCYTES NFR BLD AUTO: 0 % — LOW (ref 0.1–0.3)
INR BLD: 1.89 RATIO — HIGH (ref 0.65–1.3)
LYMPHOCYTES # BLD AUTO: 0.59 K/UL — LOW (ref 1.2–3.4)
LYMPHOCYTES # BLD AUTO: 33.1 % — SIGNIFICANT CHANGE UP (ref 20.5–51.1)
MAGNESIUM SERPL-MCNC: 1.8 MG/DL — SIGNIFICANT CHANGE UP (ref 1.8–2.4)
MCHC RBC-ENTMCNC: 29 PG — SIGNIFICANT CHANGE UP (ref 27–31)
MCHC RBC-ENTMCNC: 31.1 G/DL — LOW (ref 32–37)
MCV RBC AUTO: 93.4 FL — HIGH (ref 81–91)
MONOCYTES # BLD AUTO: 0.21 K/UL — SIGNIFICANT CHANGE UP (ref 0.1–0.6)
MONOCYTES NFR BLD AUTO: 11.8 % — HIGH (ref 1.7–9.3)
NEUTROPHILS # BLD AUTO: 0.92 K/UL — LOW (ref 1.4–6.5)
NEUTROPHILS NFR BLD AUTO: 51.7 % — SIGNIFICANT CHANGE UP (ref 42.2–75.2)
PLATELET # BLD AUTO: 23 K/UL — LOW (ref 130–400)
POTASSIUM SERPL-MCNC: 3.9 MMOL/L — SIGNIFICANT CHANGE UP (ref 3.5–5)
POTASSIUM SERPL-SCNC: 3.9 MMOL/L — SIGNIFICANT CHANGE UP (ref 3.5–5)
PROT SERPL-MCNC: 8.9 G/DL — HIGH (ref 6–8)
PROTHROM AB SERPL-ACNC: 20.7 SEC — HIGH (ref 9.95–12.87)
RBC # BLD: 2.86 M/UL — LOW (ref 4.2–5.4)
RBC # FLD: 18.2 % — HIGH (ref 11.5–14.5)
SODIUM SERPL-SCNC: 138 MMOL/L — SIGNIFICANT CHANGE UP (ref 135–146)
TYPE + AB SCN PNL BLD: SIGNIFICANT CHANGE UP
WBC # BLD: 1.78 K/UL — LOW (ref 4.8–10.8)
WBC # FLD AUTO: 1.78 K/UL — LOW (ref 4.8–10.8)

## 2018-02-18 RX ADMIN — URSODIOL 300 MILLIGRAM(S): 250 TABLET, FILM COATED ORAL at 09:20

## 2018-02-18 RX ADMIN — Medication 100 MILLIGRAM(S): at 21:13

## 2018-02-18 RX ADMIN — Medication 200 MILLIGRAM(S): at 17:12

## 2018-02-18 RX ADMIN — Medication 200 MILLIGRAM(S): at 05:28

## 2018-02-18 RX ADMIN — URSODIOL 300 MILLIGRAM(S): 250 TABLET, FILM COATED ORAL at 17:12

## 2018-02-18 RX ADMIN — Medication 100 MILLIGRAM(S): at 14:22

## 2018-02-18 RX ADMIN — Medication 100 MILLIGRAM(S): at 05:29

## 2018-02-18 RX ADMIN — URSODIOL 300 MILLIGRAM(S): 250 TABLET, FILM COATED ORAL at 12:09

## 2018-02-18 NOTE — PROGRESS NOTE ADULT - ASSESSMENT
57 year old lady with hx of liver cirrhosis and HTN came in with LUQ abdominal pain, found to have worsening thrombocytopenia    **thrombocytopenia likely 2/2 cirrhosis  -baseline 60-70  -no CBC today? will do it stat  -transfuse if any signs of bleed as per hem onc    **cecitis  -cipro + flagyl    **cirrhosis 2/2 hep C s/p tx  -dr Ortiz hepatologist is following  -waiting for records 57 year old lady with hx of liver cirrhosis and HTN came in with LUQ abdominal pain, found to have worsening thrombocytopenia    **thrombocytopenia likely 2/2 cirrhosis  -baseline 60-70  -no CBC today? will do it stat  -transfuse if any signs of bleed as per hem onc    **cecitis  -cipro + flagyl    **cirrhosis 2/2 hep C s/p tx  -dr Ortiz hepatologist is following  -waiting for records  -propranolol for Eso varices ppx  -on urosdiol too    **no DVT ppx, will check plts tonight and will follow, encourage ambulation

## 2018-02-18 NOTE — PROGRESS NOTE ADULT - SUBJECTIVE AND OBJECTIVE BOX
Patient is a 57y old  Female who presents with a chief complaint of LUQ pain (15 Feb 2018 10:36)      PAST MEDICAL & SURGICAL HISTORY:  Cirrhosis of liver without ascites, unspecified hepatic cirrhosis type  HTN (hypertension)  History of appendectomy  History of tonsillectomy      MEDICATIONS  (STANDING):  ciprofloxacin   IVPB 400 milliGRAM(s) IV Intermittent every 12 hours  ciprofloxacin   IVPB      influenza   Vaccine 0.5 milliLiter(s) IntraMuscular once  metroNIDAZOLE  IVPB 500 milliGRAM(s) IV Intermittent every 8 hours  metroNIDAZOLE  IVPB      propranolol 20 milliGRAM(s) Oral two times a day  ursodiol Capsule 300 milliGRAM(s) Oral three times a day with meals    MEDICATIONS  (PRN):      Overnight events:    Vital Signs Last 24 Hrs  T(C): 35.8 (18 Feb 2018 13:53), Max: 36.7 (17 Feb 2018 20:06)  T(F): 96.4 (18 Feb 2018 13:53), Max: 98 (17 Feb 2018 20:06)  HR: 61 (18 Feb 2018 13:53) (57 - 61)  BP: 96/55 (18 Feb 2018 13:53) (86/59 - 96/55)  BP(mean): --  RR: 18 (18 Feb 2018 13:53) (17 - 18)  SpO2: 98% (17 Feb 2018 20:06) (98% - 98%)  CAPILLARY BLOOD GLUCOSE        I&O's Summary    17 Feb 2018 07:01  -  18 Feb 2018 07:00  --------------------------------------------------------  IN: 300 mL / OUT: 0 mL / NET: 300 mL        Physical Exam:    -     General :     -      HEENT:    -      Cardiac:    -      Pulm:    -      GI:    -      Musculoskeletal:    -      Neuro:        Labs:                        8.0    1.89  )-----------( 17       ( 17 Feb 2018 07:12 )             25.9                   Imaging:  < from: US Spleen (02.15.18 @ 07:21) >  1.  Cirrhosis and splenomegaly.  SPLEEN: Enlarged measuring 20.5 x 20.5 x 7.9 cm.  2.  Cholelithiasis, sludge without sonographic evidence for cholecystitis.    < end of copied text >    < from: CT Abdomen and Pelvis w/ IV Cont (02.15.18 @ 03:37) >  1. Findings suggestive of cecitis. Differential includes infectious and   inflammatory etiologies.  2. Cholelithiasis and dilated gallbladder, correlate clinically for signs   of cholecystitis.  3. Splenomegaly and esophageal varices are suggestive of sequelae of   portal hypertension.    < end of copied text >    ECG: Patient is a 57y old  Female who presents with a chief complaint of LUQ pain (15 Feb 2018 10:36)      PAST MEDICAL & SURGICAL HISTORY:  Cirrhosis of liver without ascites, unspecified hepatic cirrhosis type  HTN (hypertension)  History of appendectomy  History of tonsillectomy      MEDICATIONS  (STANDING):  ciprofloxacin   IVPB 400 milliGRAM(s) IV Intermittent every 12 hours  ciprofloxacin   IVPB      influenza   Vaccine 0.5 milliLiter(s) IntraMuscular once  metroNIDAZOLE  IVPB 500 milliGRAM(s) IV Intermittent every 8 hours  metroNIDAZOLE  IVPB      propranolol 20 milliGRAM(s) Oral two times a day  ursodiol Capsule 300 milliGRAM(s) Oral three times a day with meals    MEDICATIONS  (PRN):      Overnight events: no abdominal pain and overt bleed.    Vital Signs Last 24 Hrs  T(C): 35.8 (18 Feb 2018 13:53), Max: 36.7 (17 Feb 2018 20:06)  T(F): 96.4 (18 Feb 2018 13:53), Max: 98 (17 Feb 2018 20:06)  HR: 61 (18 Feb 2018 13:53) (57 - 61)  BP: 96/55 (18 Feb 2018 13:53) (86/59 - 96/55)  BP(mean): --  RR: 18 (18 Feb 2018 13:53) (17 - 18)  SpO2: 98% (17 Feb 2018 20:06) (98% - 98%)  CAPILLARY BLOOD GLUCOSE        I&O's Summary    17 Feb 2018 07:01  -  18 Feb 2018 07:00  --------------------------------------------------------  IN: 300 mL / OUT: 0 mL / NET: 300 mL        Physical Exam:    -     General : NAD, lying in bed comfortable    -      HEENT:poorly injected conjunctiva    -      Cardiac:regular S1S2    -      Pulm:clear    -      GI:splenomegaly and LUQ tenderness    -      Musculoskeletal: no ext edema          Labs:                        8.0    1.89  )-----------( 17       ( 17 Feb 2018 07:12 )             25.9                   Imaging:  < from: US Spleen (02.15.18 @ 07:21) >  1.  Cirrhosis and splenomegaly.  SPLEEN: Enlarged measuring 20.5 x 20.5 x 7.9 cm.  2.  Cholelithiasis, sludge without sonographic evidence for cholecystitis.    < end of copied text >    < from: CT Abdomen and Pelvis w/ IV Cont (02.15.18 @ 03:37) >  1. Findings suggestive of cecitis. Differential includes infectious and   inflammatory etiologies.  2. Cholelithiasis and dilated gallbladder, correlate clinically for signs   of cholecystitis.  3. Splenomegaly and esophageal varices are suggestive of sequelae of   portal hypertension.    < end of copied text >    ECG:

## 2018-02-19 LAB
ALLERGY+IMMUNOLOGY DIAG STUDY NOTE: SIGNIFICANT CHANGE UP
ANION GAP SERPL CALC-SCNC: 8 MMOL/L — SIGNIFICANT CHANGE UP (ref 7–14)
BASOPHILS # BLD AUTO: 0.01 K/UL — SIGNIFICANT CHANGE UP (ref 0–0.2)
BASOPHILS NFR BLD AUTO: 0.6 % — SIGNIFICANT CHANGE UP (ref 0–1)
BLD GP AB SCN SERPL QL: (no result)
BUN SERPL-MCNC: 8 MG/DL — LOW (ref 10–20)
CALCIUM SERPL-MCNC: 8.1 MG/DL — LOW (ref 8.5–10.1)
CHLORIDE SERPL-SCNC: 107 MMOL/L — SIGNIFICANT CHANGE UP (ref 98–110)
CO2 SERPL-SCNC: 21 MMOL/L — SIGNIFICANT CHANGE UP (ref 17–32)
CREAT SERPL-MCNC: 0.6 MG/DL — LOW (ref 0.7–1.5)
EOSINOPHIL # BLD AUTO: 0.06 K/UL — SIGNIFICANT CHANGE UP (ref 0–0.7)
EOSINOPHIL NFR BLD AUTO: 3.5 % — SIGNIFICANT CHANGE UP (ref 0–8)
GLUCOSE SERPL-MCNC: 96 MG/DL — SIGNIFICANT CHANGE UP (ref 70–110)
HCT VFR BLD CALC: 29.3 % — LOW (ref 37–47)
HGB BLD-MCNC: 8.8 G/DL — LOW (ref 14–18)
IMM GRANULOCYTES NFR BLD AUTO: 0.6 % — HIGH (ref 0.1–0.3)
LYMPHOCYTES # BLD AUTO: 0.58 K/UL — LOW (ref 1.2–3.4)
LYMPHOCYTES # BLD AUTO: 33.9 % — SIGNIFICANT CHANGE UP (ref 20.5–51.1)
MCHC RBC-ENTMCNC: 28.2 PG — SIGNIFICANT CHANGE UP (ref 27–31)
MCHC RBC-ENTMCNC: 30 G/DL — LOW (ref 32–37)
MCV RBC AUTO: 93.9 FL — HIGH (ref 81–91)
MONOCYTES # BLD AUTO: 0.12 K/UL — SIGNIFICANT CHANGE UP (ref 0.1–0.6)
MONOCYTES NFR BLD AUTO: 7 % — SIGNIFICANT CHANGE UP (ref 1.7–9.3)
NEUTROPHILS # BLD AUTO: 0.93 K/UL — LOW (ref 1.4–6.5)
NEUTROPHILS NFR BLD AUTO: 54.4 % — SIGNIFICANT CHANGE UP (ref 42.2–75.2)
PLATELET # BLD AUTO: 13 K/UL — CRITICAL LOW (ref 130–400)
POTASSIUM SERPL-MCNC: 3.8 MMOL/L — SIGNIFICANT CHANGE UP (ref 3.5–5)
POTASSIUM SERPL-SCNC: 3.8 MMOL/L — SIGNIFICANT CHANGE UP (ref 3.5–5)
RBC # BLD: 3.12 M/UL — LOW (ref 4.2–5.4)
RBC # FLD: 18.6 % — HIGH (ref 11.5–14.5)
SODIUM SERPL-SCNC: 136 MMOL/L — SIGNIFICANT CHANGE UP (ref 135–146)
TYPE + AB SCN PNL BLD: SIGNIFICANT CHANGE UP
WBC # BLD: 1.71 K/UL — LOW (ref 4.8–10.8)
WBC # FLD AUTO: 1.71 K/UL — LOW (ref 4.8–10.8)

## 2018-02-19 RX ADMIN — Medication 100 MILLIGRAM(S): at 14:48

## 2018-02-19 RX ADMIN — Medication 100 MILLIGRAM(S): at 21:17

## 2018-02-19 RX ADMIN — URSODIOL 300 MILLIGRAM(S): 250 TABLET, FILM COATED ORAL at 17:33

## 2018-02-19 RX ADMIN — URSODIOL 300 MILLIGRAM(S): 250 TABLET, FILM COATED ORAL at 11:50

## 2018-02-19 RX ADMIN — Medication 100 MILLIGRAM(S): at 05:28

## 2018-02-19 RX ADMIN — URSODIOL 300 MILLIGRAM(S): 250 TABLET, FILM COATED ORAL at 08:34

## 2018-02-19 RX ADMIN — Medication 200 MILLIGRAM(S): at 17:33

## 2018-02-19 RX ADMIN — Medication 200 MILLIGRAM(S): at 05:27

## 2018-02-19 NOTE — PROGRESS NOTE ADULT - ASSESSMENT
Primary Billiary cirrhosis/autoimmune hepatitis? -   Thrombocytopenia - No acute bleed. F/U heme/onc  Cecitis - on cipro/flagyl.

## 2018-02-19 NOTE — PROGRESS NOTE ADULT - SUBJECTIVE AND OBJECTIVE BOX
Patient is a 57y old  Female who presents with a chief complaint of LUQ pain (15 Feb 2018 10:36)      PAST MEDICAL & SURGICAL HISTORY:  Cirrhosis of liver without ascites, unspecified hepatic cirrhosis type  HTN (hypertension)  History of appendectomy  History of tonsillectomy      MEDICATIONS  (STANDING):  ciprofloxacin   IVPB 400 milliGRAM(s) IV Intermittent every 12 hours  ciprofloxacin   IVPB      influenza   Vaccine 0.5 milliLiter(s) IntraMuscular once  metroNIDAZOLE  IVPB 500 milliGRAM(s) IV Intermittent every 8 hours  metroNIDAZOLE  IVPB      propranolol 20 milliGRAM(s) Oral two times a day  ursodiol Capsule 300 milliGRAM(s) Oral three times a day with meals    MEDICATIONS  (PRN):      Overnight events: no events overnight. no signs of bleed    Vital Signs Last 24 Hrs  T(C): 35.6 (19 Feb 2018 12:35), Max: 35.8 (18 Feb 2018 13:53)  T(F): 96 (19 Feb 2018 12:35), Max: 96.4 (18 Feb 2018 13:53)  HR: 59 (19 Feb 2018 12:35) (59 - 63)  BP: 89/56 (19 Feb 2018 12:35) (88/53 - 102/52)  BP(mean): --  RR: 18 (19 Feb 2018 12:35) (18 - 18)  SpO2: 98% (19 Feb 2018 08:14) (98% - 98%)        Physical Exam:    -     General : NAD, lying in bed comfortable    -      HEENT:poorly injected conjunctiva    -      Cardiac:regular S1S2    -      Pulm:clear    -      GI:splenomegaly and LUQ tenderness    -      Musculoskeletal: no ext edema-      Musculoskeletal:    -      Neuro:        Labs:                        8.8    1.71  )-----------( 13       ( 19 Feb 2018 08:56 )             29.3             02-19    136  |  107  |  8<L>  ----------------------------<  96  3.8   |  21  |  0.6<L>    Ca    8.1<L>      19 Feb 2018 08:56  Mg     1.8     02-18    TPro  8.9<H>  /  Alb  2.5<L>  /  TBili  1.0  /  DBili  0.3<H>  /  AST  53<H>  /  ALT  22  /  AlkPhos  76  02-18    LIVER FUNCTIONS - ( 18 Feb 2018 16:18 )  Alb: 2.5 g/dL / Pro: 8.9 g/dL / ALK PHOS: 76 U/L / ALT: 22 U/L / AST: 53 U/L / GGT: x                 PT/INR - ( 18 Feb 2018 16:18 )   PT: 20.70 sec;   INR: 1.89 ratio         PTT - ( 18 Feb 2018 21:29 )  PTT:37.0 sec

## 2018-02-19 NOTE — PROGRESS NOTE ADULT - SUBJECTIVE AND OBJECTIVE BOX
Patient denies bleeding    T(F): , Max: 96.2 (02-19-18 @ 05:36)  HR: 59 (02-19-18 @ 12:35) (59 - 63)  BP: 89/56 (02-19-18 @ 12:35)  RR: 18 (02-19-18 @ 12:35)  SpO2: 98% (02-19-18 @ 08:14)  General: No apparent distress  Cardiovascular: S1, S2  Gastrointestinal: Soft, Non-tender, Non-distended  Respiratory: Good air entry bilaterally  Musculoskeletal: Moves all extremities  Lymphatic: No edema  Neurologic: No gross motor deficit  Dermatologic: Skin dry  PT/INR - ( 18 Feb 2018 16:18 )   PT: 20.70 sec;   INR: 1.89 ratio         PTT - ( 18 Feb 2018 21:29 )  PTT:37.0 sec                        8.8    1.71  )-----------( 13       ( 19 Feb 2018 08:56 )             29.3     02-19    136  |  107  |  8<L>  ----------------------------<  96  3.8   |  21  |  0.6<L>    Ca    8.1<L>      19 Feb 2018 08:56  Mg     1.8     02-18    TPro  8.9<H>  /  Alb  2.5<L>  /  TBili  1.0  /  DBili  0.3<H>  /  AST  53<H>  /  ALT  22  /  AlkPhos  76  02-18

## 2018-02-19 NOTE — PROGRESS NOTE ADULT - ASSESSMENT
57 year old lady with hx of liver cirrhosis and HTN came in with LUQ abdominal pain, found to have worsening thrombocytopenia    **thrombocytopenia likely 2/2 cirrhosis + splenomegaly  -baseline 60-70 last year, contacted PMD said last plt count he had was 1 year ago and it was 88  12/19/2016  -transfuse if any signs of bleed as per hem onc or <10    **cecitis  -cipro + flagyl  -no diarrhea    **cirrhosis 2/2 ?   -as per the son patient has hepatis C and is on tx for that  -as per PMD and dr Otriz's office she has Primary biliary cirrhosis waiting for fax back   -dr Ortiz hepatologist office contacted, patient cancelled visits more than 3 times and last follow last year  -propranolol for Eso varices ppx  -on urosdiol too    **no DVT ppx, will check plts tonight and will follow, encourage ambulation 57 year old lady with hx of liver cirrhosis and HTN came in with LUQ abdominal pain, found to have worsening thrombocytopenia    **thrombocytopenia likely 2/2 cirrhosis + splenomegaly  -baseline 60-70 last year, contacted PMD said last plt count he had was 1 year ago and it was 88  12/19/2016  -transfuse if any signs of bleed as per hem onc or <10    **cecitis  -cipro + flagyl  -no diarrhea    **cirrhosis 2/2 ?   -as per the son patient has hepatis C and is on tx for that  -as per PMD and dr Ortiz's office she has Primary biliary cirrhosis, core biopsy result faxed and are in the chart. AMA was positive  -dr Ortiz hepatologist office contacted, patient cancelled visits more than 3 times and last follow last year  -propranolol for Eso varices ppx, needed banding / never followed up  -on urosdiol too    **no DVT ppx, will check plts tonight and will follow, encourage ambulation

## 2018-02-20 LAB
ANION GAP SERPL CALC-SCNC: 6 MMOL/L — LOW (ref 7–14)
BASOPHILS # BLD AUTO: 0.01 K/UL — SIGNIFICANT CHANGE UP (ref 0–0.2)
BASOPHILS NFR BLD AUTO: 0.6 % — SIGNIFICANT CHANGE UP (ref 0–1)
BUN SERPL-MCNC: 8 MG/DL — LOW (ref 10–20)
CALCIUM SERPL-MCNC: 8 MG/DL — LOW (ref 8.5–10.1)
CHLORIDE SERPL-SCNC: 104 MMOL/L — SIGNIFICANT CHANGE UP (ref 98–110)
CO2 SERPL-SCNC: 22 MMOL/L — SIGNIFICANT CHANGE UP (ref 17–32)
CREAT SERPL-MCNC: 0.7 MG/DL — SIGNIFICANT CHANGE UP (ref 0.7–1.5)
EOSINOPHIL # BLD AUTO: 0.05 K/UL — SIGNIFICANT CHANGE UP (ref 0–0.7)
EOSINOPHIL NFR BLD AUTO: 3 % — SIGNIFICANT CHANGE UP (ref 0–8)
GLUCOSE SERPL-MCNC: 87 MG/DL — SIGNIFICANT CHANGE UP (ref 70–110)
HCT VFR BLD CALC: 27.2 % — LOW (ref 37–47)
HGB BLD-MCNC: 8.4 G/DL — LOW (ref 14–18)
IMM GRANULOCYTES NFR BLD AUTO: 0 % — LOW (ref 0.1–0.3)
LYMPHOCYTES # BLD AUTO: 0.66 K/UL — LOW (ref 1.2–3.4)
LYMPHOCYTES # BLD AUTO: 40 % — SIGNIFICANT CHANGE UP (ref 20.5–51.1)
MCHC RBC-ENTMCNC: 29.1 PG — SIGNIFICANT CHANGE UP (ref 27–31)
MCHC RBC-ENTMCNC: 30.9 G/DL — LOW (ref 32–37)
MCV RBC AUTO: 94.1 FL — HIGH (ref 81–91)
MONOCYTES # BLD AUTO: 0.18 K/UL — SIGNIFICANT CHANGE UP (ref 0.1–0.6)
MONOCYTES NFR BLD AUTO: 10.9 % — HIGH (ref 1.7–9.3)
NEUTROPHILS # BLD AUTO: 0.75 K/UL — LOW (ref 1.4–6.5)
NEUTROPHILS NFR BLD AUTO: 45.5 % — SIGNIFICANT CHANGE UP (ref 42.2–75.2)
PLATELET # BLD AUTO: 15 K/UL — CRITICAL LOW (ref 130–400)
POTASSIUM SERPL-MCNC: 3.8 MMOL/L — SIGNIFICANT CHANGE UP (ref 3.5–5)
POTASSIUM SERPL-SCNC: 3.8 MMOL/L — SIGNIFICANT CHANGE UP (ref 3.5–5)
RBC # BLD: 2.89 M/UL — LOW (ref 4.2–5.4)
RBC # FLD: 18.3 % — HIGH (ref 11.5–14.5)
SODIUM SERPL-SCNC: 132 MMOL/L — LOW (ref 135–146)
WBC # BLD: 1.65 K/UL — LOW (ref 4.8–10.8)
WBC # FLD AUTO: 1.65 K/UL — LOW (ref 4.8–10.8)

## 2018-02-20 RX ORDER — CIPROFLOXACIN LACTATE 400MG/40ML
500 VIAL (ML) INTRAVENOUS EVERY 12 HOURS
Qty: 0 | Refills: 0 | Status: DISCONTINUED | OUTPATIENT
Start: 2018-02-20 | End: 2018-02-21

## 2018-02-20 RX ORDER — ACETAMINOPHEN 500 MG
650 TABLET ORAL ONCE
Qty: 0 | Refills: 0 | Status: COMPLETED | OUTPATIENT
Start: 2018-02-20 | End: 2018-02-20

## 2018-02-20 RX ORDER — METRONIDAZOLE 500 MG
500 TABLET ORAL EVERY 8 HOURS
Qty: 0 | Refills: 0 | Status: DISCONTINUED | OUTPATIENT
Start: 2018-02-20 | End: 2018-02-21

## 2018-02-20 RX ADMIN — Medication 500 MILLIGRAM(S): at 21:26

## 2018-02-20 RX ADMIN — Medication 650 MILLIGRAM(S): at 08:51

## 2018-02-20 RX ADMIN — Medication 200 MILLIGRAM(S): at 06:15

## 2018-02-20 RX ADMIN — URSODIOL 300 MILLIGRAM(S): 250 TABLET, FILM COATED ORAL at 08:51

## 2018-02-20 RX ADMIN — URSODIOL 300 MILLIGRAM(S): 250 TABLET, FILM COATED ORAL at 12:03

## 2018-02-20 RX ADMIN — Medication 100 MILLIGRAM(S): at 06:15

## 2018-02-20 RX ADMIN — Medication 100 MILLIGRAM(S): at 14:09

## 2018-02-20 RX ADMIN — URSODIOL 300 MILLIGRAM(S): 250 TABLET, FILM COATED ORAL at 18:39

## 2018-02-20 RX ADMIN — Medication 500 MILLIGRAM(S): at 18:39

## 2018-02-20 RX ADMIN — Medication 20 MILLIGRAM(S): at 18:39

## 2018-02-20 NOTE — CONSULT NOTE ADULT - PROBLEM SELECTOR RECOMMENDATION 3
-Probably secondary to splenomegaly.   -No signs of bleed. -Probably secondary to splenomegaly and portal hypertension.   -No signs of bleed.  Outpt follow up with her private hepatology for liver transplant listing

## 2018-02-20 NOTE — PROGRESS NOTE ADULT - SUBJECTIVE AND OBJECTIVE BOX
Patient seen, denies bleeding.    T(F): , Max: 97.4 (02-19-18 @ 22:20)  HR: 63 (02-20-18 @ 13:30) (53 - 63)  BP: 111/56 (02-20-18 @ 13:30)  RR: 18 (02-20-18 @ 13:30)  SpO2: 98% (02-20-18 @ 08:26)  General: No apparent distress  Cardiovascular: S1, S2  Gastrointestinal: Soft, Non-tender, Non-distended  Respiratory: Good air entry bilaterally  Musculoskeletal: Moves all extremities  Lymphatic: No edema  Neurologic: No gross motor deficit  Dermatologic: Skin dry  PTT - ( 18 Feb 2018 21:29 )  PTT:37.0 sec                        8.4    1.65  )-----------( 15       ( 20 Feb 2018 07:22 )             27.2     02-20    132<L>  |  104  |  8<L>  ----------------------------<  87  3.8   |  22  |  0.7    Ca    8.0<L>      20 Feb 2018 07:22

## 2018-02-20 NOTE — CONSULT NOTE ADULT - PROBLEM SELECTOR PROBLEM 2
Cirrhosis of liver without ascites, unspecified hepatic cirrhosis type
Cirrhosis of liver without ascites, unspecified hepatic cirrhosis type

## 2018-02-20 NOTE — PROGRESS NOTE ADULT - ASSESSMENT
57 year old lady with hx of liver cirrhosis and HTN came in with LUQ abdominal pain, found to have worsening thrombocytopenia    **thrombocytopenia likely 2/2 cirrhosis + splenomegaly  -baseline 60-70 last year, contacted PMD said last plt count he had was 1 year ago and it was 88  12/19/2016  -today's platelet count is 15     - follow up CBC's, need to follow with her GI doctor to treat her portal HTN  -transfuse if any signs of bleed or <10    **cecitis  -cipro + flagyl day 6      - switch to PO   -no diarrhea    **cirrhosis 2/2 Primary biliary cirrhosis w/ positive AMA  -as per PMD and dr Ortiz's office she has Primary biliary cirrhosis, core biopsy result faxed and are in the chart. AMA was positive  -dr Ortiz hepatologist office contacted, patient cancelled visits more than 3 times and last follow last year  -propranolol for Eso varices ppx, needed banding / never followed up  -on urosdiol too    **no DVT ppx, encourage ambulation 57 year old lady with hx of liver cirrhosis and HTN came in with LUQ abdominal pain, found to have worsening thrombocytopenia    **thrombocytopenia likely 2/2 cirrhosis + splenomegaly  -baseline 60-70 last year, contacted PMD said last plt count he had was 1 year ago and it was 88  12/19/2016  -today's platelet count is 15     - follow up CBC's    -GI consult open for management of portal hypertension    -her GI dr dr Ortiz does not have privilege here  -transfuse if any signs of bleed or <10    **cecitis  -cipro + flagyl day 6      - switch to PO   -no diarrhea    **cirrhosis 2/2 Primary biliary cirrhosis w/ positive AMA  -as per PMD and dr Ortiz's office she has Primary biliary cirrhosis, core biopsy result faxed and are in the chart. AMA was positive  -dr Ortiz hepatologist office contacted, patient cancelled visits more than 3 times and last follow last year  -propranolol for Eso varices ppx, needed banding / never followed up  -on urosdiol too    **no DVT ppx, encourage ambulation

## 2018-02-20 NOTE — PROGRESS NOTE ADULT - SUBJECTIVE AND OBJECTIVE BOX
Patient is a 57y old  Female who presents with a chief complaint of LUQ pain (15 Feb 2018 10:36)      PAST MEDICAL & SURGICAL HISTORY:  Cirrhosis of liver without ascites, unspecified hepatic cirrhosis type  HTN (hypertension)  History of appendectomy  History of tonsillectomy      MEDICATIONS  (STANDING):  ciprofloxacin   IVPB 400 milliGRAM(s) IV Intermittent every 12 hours  ciprofloxacin   IVPB      influenza   Vaccine 0.5 milliLiter(s) IntraMuscular once  metroNIDAZOLE  IVPB 500 milliGRAM(s) IV Intermittent every 8 hours  metroNIDAZOLE  IVPB      propranolol 20 milliGRAM(s) Oral two times a day  ursodiol Capsule 300 milliGRAM(s) Oral three times a day with meals    MEDICATIONS  (PRN):      Overnight events: No acute events overnight. Patient states that she has headaches and blurry vision that has been going on for the past few weeks.    Vital Signs Last 24 Hrs  T(C): 35.8 (20 Feb 2018 06:07), Max: 36.3 (19 Feb 2018 22:20)  T(F): 96.4 (20 Feb 2018 06:07), Max: 97.4 (19 Feb 2018 22:20)  HR: 53 (20 Feb 2018 06:07) (53 - 63)  BP: 98/55 (20 Feb 2018 06:07) (88/53 - 98/55)  BP(mean): --  RR: 18 (20 Feb 2018 06:07) (18 - 18)  SpO2: 98% (20 Feb 2018 08:26) (98% - 98%)  CAPILLARY BLOOD GLUCOSE        I&O's Summary      Physical Exam:    -     General : NAD    -      HEENT: PERRL    -      Cardiac: Normal S1, S2. Normal rate and rhythm, no murmurs gallops or rubs.    -      Pulm: Lungs clear b/l auscultation.    -      GI: Palpable spleen below left costal margin.    -      Musculoskeletal: No extremity edema.    -      Neuro: AAO x3         Labs:                        8.4    1.65  )-----------( 15       ( 20 Feb 2018 07:22 )             27.2             02-19    136  |  107  |  8<L>  ----------------------------<  96  3.8   |  21  |  0.6<L>    Ca    8.1<L>      19 Feb 2018 08:56  Mg     1.8     02-18    TPro  8.9<H>  /  Alb  2.5<L>  /  TBili  1.0  /  DBili  0.3<H>  /  AST  53<H>  /  ALT  22  /  AlkPhos  76  02-18    LIVER FUNCTIONS - ( 18 Feb 2018 16:18 )  Alb: 2.5 g/dL / Pro: 8.9 g/dL / ALK PHOS: 76 U/L / ALT: 22 U/L / AST: 53 U/L / GGT: x                 PT/INR - ( 18 Feb 2018 16:18 )   PT: 20.70 sec;   INR: 1.89 ratio         PTT - ( 18 Feb 2018 21:29 )  PTT:37.0 sec              Imaging:    ECG:

## 2018-02-20 NOTE — CONSULT NOTE ADULT - PROBLEM SELECTOR RECOMMENDATION 9
-Improving on IV abx. Continue for total of 7 days.   -Will need outpatient colonoscopy. -Improving on IV abx. Continue for total of 7 days.

## 2018-02-20 NOTE — PROGRESS NOTE ADULT - ASSESSMENT
Splenomegally - GI F/U. Splenic artery embolization?   Primary Billiary cirrhosis/autoimmune hepatitis? - GI F/U  Thrombocytopenia - No acute bleed. F/U heme/onc  Cecitis - on cipro/flagyl. Splenomegally - GI F/U. Splenic artery embolization? If all portal HTN related, please comment on indicated possible therapy  Primary Billiary cirrhosis/autoimmune hepatitis? - GI F/U  Thrombocytopenia - No acute bleed. F/U heme/onc.   Esophageal varicies - on propranalol but refused medications today  Cecitis - on cipro/flagyl.

## 2018-02-20 NOTE — CONSULT NOTE ADULT - SUBJECTIVE AND OBJECTIVE BOX
HPI:  56yo Female, poor historia, has PMH of cirrhosis 2/2 Hep C (s/p treatment), follow dr. Ortiz, hepatologist, here for LUQ pain. Pt has chronic abdominal pain, mostly RUQ and LUQ, was told that it's due to cirrhosis and splenomegaly. Yesterday, pt had LUQ worse than her baseline. No fever, chill, n/v/d. Had normal BM, no sick contact or abnormal diet. In ED, pt had US and CT of abd done, which showed cholelithiasis and cecitis. Her clinical symptoms does not seems to related to biliary system. However, she was found to have worsening thrombocytopenia. Pt did not have doctor follow up for one year, no recent blood work to compared to, last blood work in 2016.       PAST MEDICAL & SURGICAL HISTORY:  Cirrhosis of liver without ascites, unspecified hepatic cirrhosis type  HTN (hypertension)  History of appendectomy  History of tonsillectomy      REVIEW OF SYSTEMS:    CONSTITUTIONAL: No weakness, fevers or chills.  HEENT: No visual changes, no hearing loss, no throat pain, no headache.  NECK: No pain or stiffness, no lymphadenopathy.   RESPIRATORY: No cough, wheezing, hemoptysis. No shortness of breath.  CARDIOVASCULAR: No chest pain, no palpitations, no orthopnea, no PND.  GASTROINTESTINAL: see HPI.  GENITOURINARY: No dysuria, frequency or hematuria.  NEUROLOGICAL: No paresthesia or weakness, no dizziness.  MUSCULOSKELETAL: No back pain. No joint pain or swelling, no morning stiffness. Normal ROM.  SKIN: No itching, no rashes.    MEDICATIONS  (STANDING):  ciprofloxacin   IVPB 400 milliGRAM(s) IV Intermittent every 12 hours  ciprofloxacin   IVPB      influenza   Vaccine 0.5 milliLiter(s) IntraMuscular once  metroNIDAZOLE  IVPB 500 milliGRAM(s) IV Intermittent every 8 hours  metroNIDAZOLE  IVPB      propranolol 20 milliGRAM(s) Oral two times a day  ursodiol Capsule 300 milliGRAM(s) Oral three times a day with meals    MEDICATIONS  (PRN):      Allergies: No Known Allergies    Vital Signs Last 24 Hrs  T(C): 35.8 (20 Feb 2018 06:07), Max: 36.3 (19 Feb 2018 22:20)  T(F): 96.4 (20 Feb 2018 06:07), Max: 97.4 (19 Feb 2018 22:20)  HR: 53 (20 Feb 2018 06:07) (53 - 63)  BP: 98/55 (20 Feb 2018 06:07) (89/56 - 98/55)  BP(mean): --  RR: 18 (20 Feb 2018 06:07) (18 - 18)  SpO2: 98% (20 Feb 2018 08:26) (98% - 98%)    PHYSICAL EXAM:    CONSTITUTIONAL: NAD, well-developed, well-nourished  HEAD:  Atraumatic, normo-cephalic  EYES: EOMI, PERRLA, clear conjunctivae, anicteric sclerae, no nystagmus  NECK: Supple, no JVD, no carotid bruit   CHEST/LUNG: Clear to auscultation bilaterally, no wheezing or rhonchi  HEART: S1 S2 normal, regular rate and rhythm. No murmurs, rubs, or gallops  ABDOMEN: Bowel sounds present. Soft, non-tender, non-distended. No round or guarding  EXTREMITIES:  2+ peripheral pulses, no clubbing, cyanosis, or edema  PSYCH: Normal affect. Cooperative.  NEUROLOGY: AAOx3. No focal deficits. CN II-XII grossly intact  MSK: No joint swelling, redness   SKIN: Normal turgor, no rashes or lesions        LABS:                        8.4    1.65  )-----------( 15       ( 20 Feb 2018 07:22 )             27.2     Patient is a 57y old  Female who presents with a chief complaint of LUQ pain (15 Feb 2018 10:36)    PAST MEDICAL & SURGICAL HISTORY:  Cirrhosis of liver without ascites, unspecified hepatic cirrhosis type  HTN (hypertension)  History of appendectomy  History of tonsillectomy    Found to have thrombocytopenia and cecitis.           Hemoglobin: 8.4 g/dL (02-20-18 @ 07:22)  Hemoglobin: 8.8 g/dL (02-19-18 @ 08:56)  Hemoglobin: 8.3 g/dL (02-18-18 @ 16:18)      02-20    132<L>  |  104  |  8<L>  ----------------------------<  87  3.8   |  22  |  0.7    Ca    8.0<L>      20 Feb 2018 07:22  Mg     1.8     02-18    TPro  8.9<H>  /  Alb  2.5<L>  /  TBili  1.0  /  DBili  0.3<H>  /  AST  53<H>  /  ALT  22  /  AlkPhos  76  02-18    PT/INR - ( 18 Feb 2018 16:18 )   PT: 20.70 sec;   INR: 1.89 ratio         PTT - ( 18 Feb 2018 21:29 )  PTT:37.0 sec      RADIOLOGY & ADDITIONAL STUDIES: HPI:  56yo Female, poor historia, has PMH of cirrhosis 2/2 Hep C (s/p treatment), follow dr. Ortiz, hepatologist, here for LUQ pain. Pt has chronic abdominal pain, mostly RUQ and LUQ, was told that it's due to cirrhosis and splenomegaly. Yesterday, pt had LUQ worse than her baseline. No fever, chill, n/v/d. Had normal BM, no sick contact or abnormal diet. In ED, pt had US and CT of abd done, which showed cholelithiasis and cecitis. Her clinical symptoms does not seems to related to biliary system. However, she was found to have worsening thrombocytopenia. Pt did not have doctor follow up for one year, no recent blood work to compared to, last blood work in 2016.       PAST MEDICAL & SURGICAL HISTORY:  Cirrhosis of liver without ascites, unspecified hepatic cirrhosis type  HTN (hypertension)  History of appendectomy  History of tonsillectomy      REVIEW OF SYSTEMS:    CONSTITUTIONAL: No weakness, fevers or chills.  HEENT: No visual changes, no hearing loss, no throat pain, no headache.  NECK: No pain or stiffness, no lymphadenopathy.   RESPIRATORY: No cough, wheezing, hemoptysis. No shortness of breath.  CARDIOVASCULAR: No chest pain, no palpitations, no orthopnea, no PND.  GASTROINTESTINAL: see HPI.  GENITOURINARY: No dysuria, frequency or hematuria.  NEUROLOGICAL: No paresthesia or weakness, no dizziness.  MUSCULOSKELETAL: No back pain. No joint pain or swelling, no morning stiffness. Normal ROM.  SKIN: No itching, no rashes.    MEDICATIONS  (STANDING):  ciprofloxacin   IVPB 400 milliGRAM(s) IV Intermittent every 12 hours  ciprofloxacin   IVPB      influenza   Vaccine 0.5 milliLiter(s) IntraMuscular once  metroNIDAZOLE  IVPB 500 milliGRAM(s) IV Intermittent every 8 hours  metroNIDAZOLE  IVPB      propranolol 20 milliGRAM(s) Oral two times a day  ursodiol Capsule 300 milliGRAM(s) Oral three times a day with meals    MEDICATIONS  (PRN):      Allergies: No Known Allergies    Vital Signs Last 24 Hrs  T(C): 35.8 (20 Feb 2018 06:07), Max: 36.3 (19 Feb 2018 22:20)  T(F): 96.4 (20 Feb 2018 06:07), Max: 97.4 (19 Feb 2018 22:20)  HR: 53 (20 Feb 2018 06:07) (53 - 63)  BP: 98/55 (20 Feb 2018 06:07) (89/56 - 98/55)  BP(mean): --  RR: 18 (20 Feb 2018 06:07) (18 - 18)  SpO2: 98% (20 Feb 2018 08:26) (98% - 98%)    PHYSICAL EXAM:    CONSTITUTIONAL: NAD, well-developed, well-nourished  HEAD:  Atraumatic, normo-cephalic  EYES: EOMI, PERRLA, clear conjunctivae, anicteric sclerae, no nystagmus  NECK: Supple, no JVD, no carotid bruit   CHEST/LUNG: Clear to auscultation bilaterally, no wheezing or rhonchi  HEART: S1 S2 normal, regular rate and rhythm. No murmurs, rubs, or gallops  ABDOMEN: Bowel sounds present. Soft, non-tender, non-distended. No round or guarding  EXTREMITIES:  2+ peripheral pulses, no clubbing, cyanosis, or edema  PSYCH: Normal affect. Cooperative.  NEUROLOGY: AAOx3. No focal deficits. CN II-XII grossly intact  MSK: No joint swelling, redness   SKIN: Normal turgor, no rashes or lesions        LABS:                        8.4    1.65  )-----------( 15       ( 20 Feb 2018 07:22 )             27.2     Hemoglobin: 8.4 g/dL (02-20-18 @ 07:22)  Hemoglobin: 8.8 g/dL (02-19-18 @ 08:56)  Hemoglobin: 8.3 g/dL (02-18-18 @ 16:18)    CBC Full  -  ( 20 Feb 2018 07:22 )  WBC Count : 1.65 K/uL  Hemoglobin : 8.4 g/dL  Hematocrit : 27.2 %  Platelet Count - Automated : 15 K/uL  Mean Cell Volume : 94.1 fL  Mean Cell Hemoglobin : 29.1 pg  Mean Cell Hemoglobin Concentration : 30.9 g/dL  Auto Neutrophil # : 0.75 K/uL  Auto Lymphocyte # : 0.66 K/uL  Auto Monocyte # : 0.18 K/uL  Auto Eosinophil # : 0.05 K/uL  Auto Basophil # : 0.01 K/uL  Auto Neutrophil % : 45.5 %  Auto Lymphocyte % : 40.0 %  Auto Monocyte % : 10.9 %  Auto Eosinophil % : 3.0 %  Auto Basophil % : 0.6 %    02-20    132<L>  |  104  |  8<L>  ----------------------------<  87  3.8   |  22  |  0.7    Ca    8.0<L>      20 Feb 2018 07:22  Mg     1.8     02-18    TPro  8.9<H>  /  Alb  2.5<L>  /  TBili  1.0  /  DBili  0.3<H>  /  AST  53<H>  /  ALT  22  /  AlkPhos  76  02-18    PT/INR - ( 18 Feb 2018 16:18 )   PT: 20.70 sec;   INR: 1.89 ratio         PTT - ( 18 Feb 2018 21:29 )  PTT:37.0 sec      RADIOLOGY & ADDITIONAL STUDIES: < from: CT Abdomen and Pelvis w/ IV Cont (02.15.18 @ 03:37) >  EXAM:  CT ABDOMEN AND PELVIS IC            PROCEDURE DATE:  02/15/2018            INTERPRETATION:  CLINICAL STATEMENT: Left upper quadrant pain.      TECHNIQUE: Contiguous axial CT images were obtained from the lower chest   to the pubic symphysis following administration of 100cc Optiray 320   intravenous contrast.  Oral contrast was not administered.  Reformatted   images in the coronal and sagittal planes were acquired.    COMPARISON CT: CT abdomen and pelvis 12/20/2016    OTHER STUDIES USEDFOR CORRELATION: None.       FINDINGS:    LOWER CHEST: Unremarkable.    HEPATOBILIARY: Nodular contour of the liver. The gallbladder is dilated   measuring 5.1 cm in width. Cholelithiasis.    SPLEEN: Splenomegaly with spleen measuring 20 cm in craniocaudal   dimension..    PANCREAS: Unremarkable.    ADRENAL GLANDS: Unremarkable.    KIDNEYS: Unremarkable.    ABDOMINOPELVIC NODES: Unremarkable.    PELVIC ORGANS: Unremarkable.    PERITONEUM/MESENTERY/BOWEL: No evidence of bowel obstruction. Minimal   thickening of the cecal wall with a cecal fat stranding suggestive of   cecitis. Small amount of abdominopelvic ascites is present.    BONES/SOFT TISSUES: No acute abnormalities. Small left fat-containing   hernia..    OTHER: Esophageal varices are present.      IMPRESSION:   1. Findings suggestive of cecitis. Differential includes infectious and   inflammatory etiologies.  2. Cholelithiasis and dilated gallbladder, correlate clinically for signs   of cholecystitis.  3. Splenomegaly and esophageal varices are suggestive of sequelae of   portal hypertension.    < end of copied text >      < from: US Abdomen Limited (02.15.18 @ 07:21) >  EXAM:  US ABDOMEN LIMITED        EXAM:  US SPLEEN            PROCEDURE DATE:  02/15/2018            INTERPRETATION:  CLINICAL HISTORY: Right upper quadrant abdominal pain..    COMPARISON: Correlation made with CT abdomen pelvis February 15, 2018.    PROCEDURE: Ultrasound of the right upper quadrant of the spleen was   performed.    FINDINGS:    LIVER: Heterogeneous with a nodular contour measuring 16.4 cm in length.   No focal mass.    GALLBLADDER/BILIARY TREE:  Cholelithiasis and sludge. No wall thickening   or pericholecystic fluid.  Negative sonographic Link's sign. No   intrahepatic biliary ductal dilatation. The common bile duct measures 8   mm, which is dilated.     PANCREAS:  Visualized head and body are unremarkable. Remainder obscured   by overlying bowel gas.    KIDNEY:  Right kidney measures 9.8 cm in length. No hydronephrosis,   calculi or solid mass.    SPLEEN: Enlarged measuring 20.5 x 20.5 x 7.9 cm.    AORTA/IVC:  Visualized proximal portions unremarkable.    ASCITES: Trace ascites.    IMPRESSION:    1.  Cirrhosis and splenomegaly.    2.  Cholelithiasis, sludge without sonographic evidence for cholecystitis.    < end of copied text > HPI:  56yo Female with PMH of cirrhosis 2/2 PBC , follow dr. Ortiz, hepatologist, here for LUQ pain. Pt has chronic abdominal pain, mostly RUQ and LUQ, was told that it's due to cirrhosis and splenomegaly. She complains of on/off diarrhea. Yesterday, pt had LUQ worse than her baseline. No fever, chill, n/v/d. , no sick contact or abnormal diet. In ED, pt had US and CT of abd done, which showed cholelithiasis and cecitis. Her clinical symptoms does not seems to related to biliary system. However, she was found to have worsening thrombocytopenia. Last visit to her hepatologist was more than 6 months ago. Most recent EGD was 6 months ptp. Never had a colonoscopy    PAST MEDICAL & SURGICAL HISTORY:  Cirrhosis of liver without ascites, unspecified hepatic cirrhosis type  HTN (hypertension)  History of appendectomy  History of tonsillectomy      REVIEW OF SYSTEMS:    CONSTITUTIONAL: No weakness, fevers or chills.  HEENT: No visual changes, no hearing loss, no throat pain, no headache.  NECK: No pain or stiffness, no lymphadenopathy.   RESPIRATORY: No cough, wheezing, hemoptysis. No shortness of breath.  CARDIOVASCULAR: No chest pain, no palpitations, no orthopnea, no PND.  GASTROINTESTINAL: see HPI.  GENITOURINARY: No dysuria, frequency or hematuria.  NEUROLOGICAL: No paresthesia or weakness, no dizziness.  MUSCULOSKELETAL: No back pain. No joint pain or swelling, no morning stiffness. Normal ROM.  SKIN: No itching, no rashes.    MEDICATIONS  (STANDING):  ciprofloxacin   IVPB 400 milliGRAM(s) IV Intermittent every 12 hours  ciprofloxacin   IVPB      influenza   Vaccine 0.5 milliLiter(s) IntraMuscular once  metroNIDAZOLE  IVPB 500 milliGRAM(s) IV Intermittent every 8 hours  metroNIDAZOLE  IVPB      propranolol 20 milliGRAM(s) Oral two times a day  ursodiol Capsule 300 milliGRAM(s) Oral three times a day with meals    MEDICATIONS  (PRN):      Allergies: No Known Allergies    Vital Signs Last 24 Hrs  T(C): 35.8 (20 Feb 2018 06:07), Max: 36.3 (19 Feb 2018 22:20)  T(F): 96.4 (20 Feb 2018 06:07), Max: 97.4 (19 Feb 2018 22:20)  HR: 53 (20 Feb 2018 06:07) (53 - 63)  BP: 98/55 (20 Feb 2018 06:07) (89/56 - 98/55)  BP(mean): --  RR: 18 (20 Feb 2018 06:07) (18 - 18)  SpO2: 98% (20 Feb 2018 08:26) (98% - 98%)    PHYSICAL EXAM:    CONSTITUTIONAL: NAD, well-developed, well-nourished  HEAD:  Atraumatic, normo-cephalic  EYES: EOMI, PERRLA, clear conjunctivae, anicteric sclerae, no nystagmus  NECK: Supple, no JVD, no carotid bruit   CHEST/LUNG: Clear to auscultation bilaterally, no wheezing or rhonchi  HEART: S1 S2 normal, regular rate and rhythm. No murmurs, rubs, or gallops  ABDOMEN: Bowel sounds present. Soft, LUQ tenderness, non-distended. No round or guarding. Spleen and liver palpable 2cm below the coastal margin.  EXTREMITIES:  2+ peripheral pulses, no clubbing, cyanosis, or edema  PSYCH: Normal affect. Cooperative.  NEUROLOGY: AAOx3. No focal deficits. CN II-XII grossly intact  MSK: No joint swelling, redness   SKIN: Normal turgor, no rashes or lesions        LABS:                        8.4    1.65  )-----------( 15       ( 20 Feb 2018 07:22 )             27.2     Hemoglobin: 8.4 g/dL (02-20-18 @ 07:22)  Hemoglobin: 8.8 g/dL (02-19-18 @ 08:56)  Hemoglobin: 8.3 g/dL (02-18-18 @ 16:18)    CBC Full  -  ( 20 Feb 2018 07:22 )  WBC Count : 1.65 K/uL  Hemoglobin : 8.4 g/dL  Hematocrit : 27.2 %  Platelet Count - Automated : 15 K/uL  Mean Cell Volume : 94.1 fL  Mean Cell Hemoglobin : 29.1 pg  Mean Cell Hemoglobin Concentration : 30.9 g/dL  Auto Neutrophil # : 0.75 K/uL  Auto Lymphocyte # : 0.66 K/uL  Auto Monocyte # : 0.18 K/uL  Auto Eosinophil # : 0.05 K/uL  Auto Basophil # : 0.01 K/uL  Auto Neutrophil % : 45.5 %  Auto Lymphocyte % : 40.0 %  Auto Monocyte % : 10.9 %  Auto Eosinophil % : 3.0 %  Auto Basophil % : 0.6 %    02-20    132<L>  |  104  |  8<L>  ----------------------------<  87  3.8   |  22  |  0.7    Ca    8.0<L>      20 Feb 2018 07:22  Mg     1.8     02-18    TPro  8.9<H>  /  Alb  2.5<L>  /  TBili  1.0  /  DBili  0.3<H>  /  AST  53<H>  /  ALT  22  /  AlkPhos  76  02-18    PT/INR - ( 18 Feb 2018 16:18 )   PT: 20.70 sec;   INR: 1.89 ratio         PTT - ( 18 Feb 2018 21:29 )  PTT:37.0 sec      RADIOLOGY & ADDITIONAL STUDIES: < from: CT Abdomen and Pelvis w/ IV Cont (02.15.18 @ 03:37) >  EXAM:  CT ABDOMEN AND PELVIS IC            PROCEDURE DATE:  02/15/2018            INTERPRETATION:  CLINICAL STATEMENT: Left upper quadrant pain.      TECHNIQUE: Contiguous axial CT images were obtained from the lower chest   to the pubic symphysis following administration of 100cc Optiray 320   intravenous contrast.  Oral contrast was not administered.  Reformatted   images in the coronal and sagittal planes were acquired.    COMPARISON CT: CT abdomen and pelvis 12/20/2016    OTHER STUDIES USEDFOR CORRELATION: None.       FINDINGS:    LOWER CHEST: Unremarkable.    HEPATOBILIARY: Nodular contour of the liver. The gallbladder is dilated   measuring 5.1 cm in width. Cholelithiasis.    SPLEEN: Splenomegaly with spleen measuring 20 cm in craniocaudal   dimension..    PANCREAS: Unremarkable.    ADRENAL GLANDS: Unremarkable.    KIDNEYS: Unremarkable.    ABDOMINOPELVIC NODES: Unremarkable.    PELVIC ORGANS: Unremarkable.    PERITONEUM/MESENTERY/BOWEL: No evidence of bowel obstruction. Minimal   thickening of the cecal wall with a cecal fat stranding suggestive of   cecitis. Small amount of abdominopelvic ascites is present.    BONES/SOFT TISSUES: No acute abnormalities. Small left fat-containing   hernia..    OTHER: Esophageal varices are present.      IMPRESSION:   1. Findings suggestive of cecitis. Differential includes infectious and   inflammatory etiologies.  2. Cholelithiasis and dilated gallbladder, correlate clinically for signs   of cholecystitis.  3. Splenomegaly and esophageal varices are suggestive of sequelae of   portal hypertension.    < end of copied text >      < from: US Abdomen Limited (02.15.18 @ 07:21) >  EXAM:  US ABDOMEN LIMITED        EXAM:  US SPLEEN            PROCEDURE DATE:  02/15/2018            INTERPRETATION:  CLINICAL HISTORY: Right upper quadrant abdominal pain..    COMPARISON: Correlation made with CT abdomen pelvis February 15, 2018.    PROCEDURE: Ultrasound of the right upper quadrant of the spleen was   performed.    FINDINGS:    LIVER: Heterogeneous with a nodular contour measuring 16.4 cm in length.   No focal mass.    GALLBLADDER/BILIARY TREE:  Cholelithiasis and sludge. No wall thickening   or pericholecystic fluid.  Negative sonographic Link's sign. No   intrahepatic biliary ductal dilatation. The common bile duct measures 8   mm, which is dilated.     PANCREAS:  Visualized head and body are unremarkable. Remainder obscured   by overlying bowel gas.    KIDNEY:  Right kidney measures 9.8 cm in length. No hydronephrosis,   calculi or solid mass.    SPLEEN: Enlarged measuring 20.5 x 20.5 x 7.9 cm.    AORTA/IVC:  Visualized proximal portions unremarkable.    ASCITES: Trace ascites.    IMPRESSION:    1.  Cirrhosis and splenomegaly.    2.  Cholelithiasis, sludge without sonographic evidence for cholecystitis.    < end of copied text > HPI:  56yo Female with PMH of cirrhosis 2/2 PBC , follow dr. Ortiz, hepatologist, here for LUQ pain. Pt has chronic abdominal pain, mostly RUQ and LUQ, was told that it's due to cirrhosis and splenomegaly. She complains of on/off diarrhea. Yesterday, pt had LUQ worse than her baseline. No fever, chill, n/v/d. , no sick contact or abnormal diet. In ED, pt had US and CT of abd done, which showed cholelithiasis and cecitis. Her clinical symptoms does not seems to related to biliary system. However, she was found to have worsening thrombocytopenia. Last visit to her hepatologist was more than 6 months ago. She was diagnosed with PBC in 2003. Most recent EGD was 6 months ptp. Never had a colonoscopy. Denies hx of UGI bleed or variceal bleed. No melena, hematochezia, or hemoptysis.     PAST MEDICAL & SURGICAL HISTORY:  Cirrhosis of liver without ascites, unspecified hepatic cirrhosis type  HTN (hypertension)  History of appendectomy  History of tonsillectomy      REVIEW OF SYSTEMS:    CONSTITUTIONAL: No weakness, fevers or chills.  HEENT: No visual changes, no hearing loss, no throat pain, no headache.  NECK: No pain or stiffness, no lymphadenopathy.   RESPIRATORY: No cough, wheezing, hemoptysis. No shortness of breath.  CARDIOVASCULAR: No chest pain, no palpitations, no orthopnea, no PND.  GASTROINTESTINAL: see HPI.  GENITOURINARY: No dysuria, frequency or hematuria.  NEUROLOGICAL: No paresthesia or weakness, no dizziness.  MUSCULOSKELETAL: No back pain. No joint pain or swelling, no morning stiffness. Normal ROM.  SKIN: No itching, no rashes.    MEDICATIONS  (STANDING):  ciprofloxacin   IVPB 400 milliGRAM(s) IV Intermittent every 12 hours  ciprofloxacin   IVPB      influenza   Vaccine 0.5 milliLiter(s) IntraMuscular once  metroNIDAZOLE  IVPB 500 milliGRAM(s) IV Intermittent every 8 hours  metroNIDAZOLE  IVPB      propranolol 20 milliGRAM(s) Oral two times a day  ursodiol Capsule 300 milliGRAM(s) Oral three times a day with meals    MEDICATIONS  (PRN):      Allergies: No Known Allergies    Vital Signs Last 24 Hrs  T(C): 35.8 (20 Feb 2018 06:07), Max: 36.3 (19 Feb 2018 22:20)  T(F): 96.4 (20 Feb 2018 06:07), Max: 97.4 (19 Feb 2018 22:20)  HR: 53 (20 Feb 2018 06:07) (53 - 63)  BP: 98/55 (20 Feb 2018 06:07) (89/56 - 98/55)  BP(mean): --  RR: 18 (20 Feb 2018 06:07) (18 - 18)  SpO2: 98% (20 Feb 2018 08:26) (98% - 98%)    PHYSICAL EXAM:    CONSTITUTIONAL: NAD, well-developed, well-nourished  HEAD:  Atraumatic, normo-cephalic  EYES: EOMI, PERRLA, clear conjunctivae, anicteric sclerae, no nystagmus  NECK: Supple, no JVD, no carotid bruit   CHEST/LUNG: Clear to auscultation bilaterally, no wheezing or rhonchi  HEART: S1 S2 normal, regular rate and rhythm. No murmurs, rubs, or gallops  ABDOMEN: Bowel sounds present. Soft, LUQ tenderness, non-distended. No round or guarding. Spleen and liver palpable 2cm below the coastal margin.  EXTREMITIES:  2+ peripheral pulses, no clubbing, cyanosis, or edema  PSYCH: Normal affect. Cooperative.  NEUROLOGY: AAOx3. No focal deficits. CN II-XII grossly intact  MSK: No joint swelling, redness   SKIN: Normal turgor, no rashes or lesions        LABS:                        8.4    1.65  )-----------( 15       ( 20 Feb 2018 07:22 )             27.2     Hemoglobin: 8.4 g/dL (02-20-18 @ 07:22)  Hemoglobin: 8.8 g/dL (02-19-18 @ 08:56)  Hemoglobin: 8.3 g/dL (02-18-18 @ 16:18)    CBC Full  -  ( 20 Feb 2018 07:22 )  WBC Count : 1.65 K/uL  Hemoglobin : 8.4 g/dL  Hematocrit : 27.2 %  Platelet Count - Automated : 15 K/uL  Mean Cell Volume : 94.1 fL  Mean Cell Hemoglobin : 29.1 pg  Mean Cell Hemoglobin Concentration : 30.9 g/dL  Auto Neutrophil # : 0.75 K/uL  Auto Lymphocyte # : 0.66 K/uL  Auto Monocyte # : 0.18 K/uL  Auto Eosinophil # : 0.05 K/uL  Auto Basophil # : 0.01 K/uL  Auto Neutrophil % : 45.5 %  Auto Lymphocyte % : 40.0 %  Auto Monocyte % : 10.9 %  Auto Eosinophil % : 3.0 %  Auto Basophil % : 0.6 %    02-20    132<L>  |  104  |  8<L>  ----------------------------<  87  3.8   |  22  |  0.7    Ca    8.0<L>      20 Feb 2018 07:22  Mg     1.8     02-18    TPro  8.9<H>  /  Alb  2.5<L>  /  TBili  1.0  /  DBili  0.3<H>  /  AST  53<H>  /  ALT  22  /  AlkPhos  76  02-18    PT/INR - ( 18 Feb 2018 16:18 )   PT: 20.70 sec;   INR: 1.89 ratio         PTT - ( 18 Feb 2018 21:29 )  PTT:37.0 sec      RADIOLOGY & ADDITIONAL STUDIES: < from: CT Abdomen and Pelvis w/ IV Cont (02.15.18 @ 03:37) >  EXAM:  CT ABDOMEN AND PELVIS IC            PROCEDURE DATE:  02/15/2018            INTERPRETATION:  CLINICAL STATEMENT: Left upper quadrant pain.      TECHNIQUE: Contiguous axial CT images were obtained from the lower chest   to the pubic symphysis following administration of 100cc Optiray 320   intravenous contrast.  Oral contrast was not administered.  Reformatted   images in the coronal and sagittal planes were acquired.    COMPARISON CT: CT abdomen and pelvis 12/20/2016    OTHER STUDIES USEDFOR CORRELATION: None.       FINDINGS:    LOWER CHEST: Unremarkable.    HEPATOBILIARY: Nodular contour of the liver. The gallbladder is dilated   measuring 5.1 cm in width. Cholelithiasis.    SPLEEN: Splenomegaly with spleen measuring 20 cm in craniocaudal   dimension..    PANCREAS: Unremarkable.    ADRENAL GLANDS: Unremarkable.    KIDNEYS: Unremarkable.    ABDOMINOPELVIC NODES: Unremarkable.    PELVIC ORGANS: Unremarkable.    PERITONEUM/MESENTERY/BOWEL: No evidence of bowel obstruction. Minimal   thickening of the cecal wall with a cecal fat stranding suggestive of   cecitis. Small amount of abdominopelvic ascites is present.    BONES/SOFT TISSUES: No acute abnormalities. Small left fat-containing   hernia..    OTHER: Esophageal varices are present.      IMPRESSION:   1. Findings suggestive of cecitis. Differential includes infectious and   inflammatory etiologies.  2. Cholelithiasis and dilated gallbladder, correlate clinically for signs   of cholecystitis.  3. Splenomegaly and esophageal varices are suggestive of sequelae of   portal hypertension.    < end of copied text >      < from: US Abdomen Limited (02.15.18 @ 07:21) >  EXAM:  US ABDOMEN LIMITED        EXAM:  US SPLEEN            PROCEDURE DATE:  02/15/2018            INTERPRETATION:  CLINICAL HISTORY: Right upper quadrant abdominal pain..    COMPARISON: Correlation made with CT abdomen pelvis February 15, 2018.    PROCEDURE: Ultrasound of the right upper quadrant of the spleen was   performed.    FINDINGS:    LIVER: Heterogeneous with a nodular contour measuring 16.4 cm in length.   No focal mass.    GALLBLADDER/BILIARY TREE:  Cholelithiasis and sludge. No wall thickening   or pericholecystic fluid.  Negative sonographic Link's sign. No   intrahepatic biliary ductal dilatation. The common bile duct measures 8   mm, which is dilated.     PANCREAS:  Visualized head and body are unremarkable. Remainder obscured   by overlying bowel gas.    KIDNEY:  Right kidney measures 9.8 cm in length. No hydronephrosis,   calculi or solid mass.    SPLEEN: Enlarged measuring 20.5 x 20.5 x 7.9 cm.    AORTA/IVC:  Visualized proximal portions unremarkable.    ASCITES: Trace ascites.    IMPRESSION:    1.  Cirrhosis and splenomegaly.    2.  Cholelithiasis, sludge without sonographic evidence for cholecystitis.    < end of copied text >

## 2018-02-20 NOTE — CONSULT NOTE ADULT - ASSESSMENT
Patient is a 57y old  Female who presents with a chief complaint of LUQ pain.    PAST MEDICAL & SURGICAL HISTORY:  Cirrhosis of liver without ascites, unspecified hepatic cirrhosis type  HTN (hypertension)  History of appendectomy  History of tonsillectomy    Found to have Patient is a 57y old  Female who presents with a chief complaint of LUQ pain.    PAST MEDICAL & SURGICAL HISTORY:  Cirrhosis of liver without ascites, unspecified hepatic cirrhosis type  HTN (hypertension)  History of appendectomy  History of tonsillectomy    Found to have pancytopenia and cecitis.

## 2018-02-21 ENCOUNTER — TRANSCRIPTION ENCOUNTER (OUTPATIENT)
Age: 58
End: 2018-02-21

## 2018-02-21 VITALS
RESPIRATION RATE: 16 BRPM | SYSTOLIC BLOOD PRESSURE: 102 MMHG | DIASTOLIC BLOOD PRESSURE: 53 MMHG | TEMPERATURE: 97 F | HEART RATE: 60 BPM

## 2018-02-21 LAB
BASOPHILS # BLD AUTO: 0.02 K/UL — SIGNIFICANT CHANGE UP (ref 0–0.2)
BASOPHILS NFR BLD AUTO: 0.9 % — SIGNIFICANT CHANGE UP (ref 0–1)
EOSINOPHIL # BLD AUTO: 0.08 K/UL — SIGNIFICANT CHANGE UP (ref 0–0.7)
EOSINOPHIL NFR BLD AUTO: 3.5 % — SIGNIFICANT CHANGE UP (ref 0–8)
HCT VFR BLD CALC: 30.3 % — LOW (ref 37–47)
HGB BLD-MCNC: 9.2 G/DL — LOW (ref 14–18)
IMM GRANULOCYTES NFR BLD AUTO: 0.4 % — HIGH (ref 0.1–0.3)
LYMPHOCYTES # BLD AUTO: 0.75 K/UL — LOW (ref 1.2–3.4)
LYMPHOCYTES # BLD AUTO: 32.8 % — SIGNIFICANT CHANGE UP (ref 20.5–51.1)
MCHC RBC-ENTMCNC: 28.6 PG — SIGNIFICANT CHANGE UP (ref 27–31)
MCHC RBC-ENTMCNC: 30.4 G/DL — LOW (ref 32–37)
MCV RBC AUTO: 94.1 FL — HIGH (ref 81–91)
MONOCYTES # BLD AUTO: 0.19 K/UL — SIGNIFICANT CHANGE UP (ref 0.1–0.6)
MONOCYTES NFR BLD AUTO: 8.3 % — SIGNIFICANT CHANGE UP (ref 1.7–9.3)
NEUTROPHILS # BLD AUTO: 1.24 K/UL — LOW (ref 1.4–6.5)
NEUTROPHILS NFR BLD AUTO: 54.1 % — SIGNIFICANT CHANGE UP (ref 42.2–75.2)
PLATELET # BLD AUTO: 30 K/UL — LOW (ref 130–400)
RBC # BLD: 3.22 M/UL — LOW (ref 4.2–5.4)
RBC # FLD: 18.3 % — HIGH (ref 11.5–14.5)
WBC # BLD: 2.29 K/UL — LOW (ref 4.8–10.8)
WBC # FLD AUTO: 2.29 K/UL — LOW (ref 4.8–10.8)

## 2018-02-21 RX ADMIN — INFLUENZA VIRUS VACCINE 0.5 MILLILITER(S): 15; 15; 15; 15 SUSPENSION INTRAMUSCULAR at 17:51

## 2018-02-21 RX ADMIN — URSODIOL 300 MILLIGRAM(S): 250 TABLET, FILM COATED ORAL at 08:50

## 2018-02-21 RX ADMIN — Medication 500 MILLIGRAM(S): at 05:56

## 2018-02-21 RX ADMIN — Medication 500 MILLIGRAM(S): at 13:45

## 2018-02-21 RX ADMIN — URSODIOL 300 MILLIGRAM(S): 250 TABLET, FILM COATED ORAL at 16:50

## 2018-02-21 RX ADMIN — URSODIOL 300 MILLIGRAM(S): 250 TABLET, FILM COATED ORAL at 12:13

## 2018-02-21 NOTE — DISCHARGE NOTE ADULT - MEDICATION SUMMARY - MEDICATIONS TO TAKE
I will START or STAY ON the medications listed below when I get home from the hospital:    propranolol 20 mg oral tablet  -- 1 tab(s) by mouth 2 times a day  -- Indication: For esophageal varices    ursodiol 300 mg oral tablet  -- 1 tab(s) by mouth 3 times a day  -- Indication: For Primary biliary cirrhosis

## 2018-02-21 NOTE — PROGRESS NOTE ADULT - ATTENDING COMMENTS
Patient seen and examined independently. I agree with the resident's note, physical exam, and plan except as below.  pt without any complaints - no fever, abd pain, bleeding - tolerating diet  #abd pain resolved - supportive care  #cecitis - finish 7 days of cipro/flagyl  #liver cirrhosis secondary to PBC/hepc - outpt follow up with primary GI - Dr Ortiz - pt advised to be compliant with follow up   GI team here rec to place pt on transplant service - may be difficult if noncomplaint  #resume propanolol if bp improved  #no active bleeding - but needs endoscopy for varices  #thrombocytopenia improved - no bleeding, likely chronic secondary to Liver Dz and splenomegaly  #stable to dc home  meds reviewed, pt counselled, time spent 35 mins
Patient seen and examined independently. Agree with resident note with exceptions. Case discussed with housestaff, nursing and patient  T(F): , Max: 98 (02-17-18 @ 20:06)  HR: 61 (02-18-18 @ 13:53) (57 - 61)  BP: 96/55 (02-18-18 @ 13:53)  RR: 18 (02-18-18 @ 13:53)  SpO2: 98% (02-17-18 @ 20:06)  IN: 300 mL / OUT: 0 mL / NET: 300 mL      General: No apparent distress  Cardiovascular: S1, S2  Gastrointestinal: Soft, Non-tender, Non-distended  Respiratory: Good air entry bilaterally  Musculoskeletal: Moves all extremities  Lymphatic: No edema  Neurologic: No gross motor deficit  Dermatologic: Skin dry                          8.0    1.89  )-----------( 17       ( 17 Feb 2018 07:12 )             25.9     A/P  Thrombocytopenia - f/u hepatologist for prior counts. No acute bleed. F/U heme/onc  Cecitis - on cipro/flagyl.   Cirrhosis  - Hep C related, unclear if treated prior or if currently being treated.

## 2018-02-21 NOTE — DISCHARGE NOTE ADULT - PATIENT PORTAL LINK FT
You can access the [facility] Patient Portal, offered by [facility], by registering with the following website: [email or web address]

## 2018-02-21 NOTE — DISCHARGE NOTE ADULT - CARE PROVIDER_API CALL
Estrada Ortiz  [address], [place] [address]  Phone: [phone]  Fax: (   )    -    Gustabo Rosas  [address], [place] [address]  Phone: [phone]  Fax: (   )    -

## 2018-02-21 NOTE — PROGRESS NOTE ADULT - ASSESSMENT
57 year old lady with hx of liver cirrhosis and HTN came in with LUQ abdominal pain, found to have worsening thrombocytopenia    **thrombocytopenia likely 2/2 cirrhosis + splenomegaly  -baseline 60-70 last year, contacted PMD said last plt count he had was 1 year ago and it was 88  12/19/2016  -today's platelet count is pending, but no signs of bleeding     - follow up CBC's    -GI consult open for management of portal hypertension    -her GI dr dr Ortiz does not have privilege here  -transfuse if any signs of bleed or <10    **cecitis  -cipro + flagyl day 7  -no diarrhea  -colonoscopy as outpatient    **cirrhosis 2/2 Primary biliary cirrhosis w/ positive AMA  -core biopsy result faxed and are in the chart  -dr Ortiz hepatologist office contacted, patient cancelled visits more than 3 times and last follow last year  -propranolol on hold due to low blood pressure      -Eso varices ppx, needed banding / never followed up  -on urosdiol     **DVT PPX: SCD, encourage ambulation

## 2018-02-21 NOTE — DISCHARGE NOTE ADULT - PLAN OF CARE
prevents bleeding -your low blood count is low because of your liver disease and big spleen  -please follow up with dr Ortiz / may need liver transplant  -if severe headache / severe bleed / decrease consciousness please come to emergency or call [phone] prevents recurrence -you finished your antibiotic course  -you need colonoscopy as outpatient   -if diarrhea or vomiting please call your doctor

## 2018-02-21 NOTE — DISCHARGE NOTE ADULT - HOSPITAL COURSE
57 year old lady with hx of liver cirrhosis and HTN came in with LUQ abdominal pain diagnosed as Cecitis on Ct scan, she received 7 days of ciprofloxacin and flagyl.  she was found to have worsening thrombocytopenia thought to be 2/2 to cirrhosis + splenomegaly, no transfusion was needed, no signs of active bleed, last platelets count is 30K on 2/21/2018. patient needs to follow as Outpt with her private hepatology for liver transplant listing and for screening colonoscopy

## 2018-02-21 NOTE — PROGRESS NOTE ADULT - PROVIDER SPECIALTY LIST ADULT
Hospitalist
Internal Medicine

## 2018-02-21 NOTE — DISCHARGE NOTE ADULT - CARE PLAN
Principal Discharge DX:	Pancytopenia  Goal:	prevents bleeding  Assessment and plan of treatment:	-your low blood count is low because of your liver disease and big spleen  -please follow up with dr Ortiz / may need liver transplant  -if severe headache / severe bleed / decrease consciousness please come to emergency or call 911  Secondary Diagnosis:	Cecitis  Goal:	prevents recurrence  Assessment and plan of treatment:	-you finished your antibiotic course  -you need colonoscopy as outpatient   -if diarrhea or vomiting please call your doctor

## 2018-02-21 NOTE — DISCHARGE NOTE ADULT - PROVIDER TOKENS
FREE:[LAST:[Ortiz],FIRST:[Estrada],PHONE:[phone]],FAX:[(   [phone]    -],ADDRESS:[[address] [place] [address]]],FREE:[LAST:[Rettarosalina],FIRST:[Gustabo],PHONE:[phone]],FAX:[(   )    -],ADDRESS:[[address] [place] [address] [address], [place] [address]]]

## 2018-02-23 DIAGNOSIS — D61.818 OTHER PANCYTOPENIA: ICD-10-CM

## 2018-02-23 DIAGNOSIS — F17.200 NICOTINE DEPENDENCE, UNSPECIFIED, UNCOMPLICATED: ICD-10-CM

## 2018-02-23 DIAGNOSIS — K76.6 PORTAL HYPERTENSION: ICD-10-CM

## 2018-02-23 DIAGNOSIS — R16.2 HEPATOMEGALY WITH SPLENOMEGALY, NOT ELSEWHERE CLASSIFIED: ICD-10-CM

## 2018-02-23 DIAGNOSIS — B19.20 UNSPECIFIED VIRAL HEPATITIS C WITHOUT HEPATIC COMA: ICD-10-CM

## 2018-02-23 DIAGNOSIS — R10.9 UNSPECIFIED ABDOMINAL PAIN: ICD-10-CM

## 2018-02-23 DIAGNOSIS — I85.10 SECONDARY ESOPHAGEAL VARICES WITHOUT BLEEDING: ICD-10-CM

## 2018-02-23 DIAGNOSIS — K80.20 CALCULUS OF GALLBLADDER WITHOUT CHOLECYSTITIS WITHOUT OBSTRUCTION: ICD-10-CM

## 2018-02-23 DIAGNOSIS — K72.10 CHRONIC HEPATIC FAILURE WITHOUT COMA: ICD-10-CM

## 2018-02-23 DIAGNOSIS — I10 ESSENTIAL (PRIMARY) HYPERTENSION: ICD-10-CM

## 2018-02-23 DIAGNOSIS — K74.3 PRIMARY BILIARY CIRRHOSIS: ICD-10-CM

## 2018-08-06 ENCOUNTER — EMERGENCY (EMERGENCY)
Facility: HOSPITAL | Age: 58
LOS: 1 days | Discharge: HOME | End: 2018-08-06
Attending: EMERGENCY MEDICINE | Admitting: EMERGENCY MEDICINE

## 2018-08-06 VITALS
HEART RATE: 57 BPM | TEMPERATURE: 97 F | SYSTOLIC BLOOD PRESSURE: 71 MMHG | OXYGEN SATURATION: 98 % | DIASTOLIC BLOOD PRESSURE: 45 MMHG | RESPIRATION RATE: 18 BRPM

## 2018-08-06 DIAGNOSIS — I10 ESSENTIAL (PRIMARY) HYPERTENSION: ICD-10-CM

## 2018-08-06 DIAGNOSIS — R53.1 WEAKNESS: ICD-10-CM

## 2018-08-06 DIAGNOSIS — Z79.899 OTHER LONG TERM (CURRENT) DRUG THERAPY: ICD-10-CM

## 2018-08-06 DIAGNOSIS — F17.210 NICOTINE DEPENDENCE, CIGARETTES, UNCOMPLICATED: ICD-10-CM

## 2018-08-06 DIAGNOSIS — R10.816 EPIGASTRIC ABDOMINAL TENDERNESS: ICD-10-CM

## 2018-08-06 DIAGNOSIS — R05 COUGH: ICD-10-CM

## 2018-08-06 DIAGNOSIS — Z98.890 OTHER SPECIFIED POSTPROCEDURAL STATES: Chronic | ICD-10-CM

## 2018-08-06 LAB
ALBUMIN SERPL ELPH-MCNC: 2.9 G/DL — LOW (ref 3.5–5.2)
ALP SERPL-CCNC: 83 U/L — SIGNIFICANT CHANGE UP (ref 30–115)
ALT FLD-CCNC: 21 U/L — SIGNIFICANT CHANGE UP (ref 0–41)
AMMONIA BLD-MCNC: 39 UMOL/L — SIGNIFICANT CHANGE UP (ref 11–55)
ANION GAP SERPL CALC-SCNC: 17 MMOL/L — HIGH (ref 7–14)
AST SERPL-CCNC: 59 U/L — HIGH (ref 0–41)
BASOPHILS # BLD AUTO: 0.01 K/UL — SIGNIFICANT CHANGE UP (ref 0–0.2)
BASOPHILS NFR BLD AUTO: 0.5 % — SIGNIFICANT CHANGE UP (ref 0–1)
BILIRUB SERPL-MCNC: 0.8 MG/DL — SIGNIFICANT CHANGE UP (ref 0.2–1.2)
BUN SERPL-MCNC: 9 MG/DL — LOW (ref 10–20)
CALCIUM SERPL-MCNC: 7.8 MG/DL — LOW (ref 8.5–10.1)
CHLORIDE SERPL-SCNC: 105 MMOL/L — SIGNIFICANT CHANGE UP (ref 98–110)
CK SERPL-CCNC: 133 U/L — SIGNIFICANT CHANGE UP (ref 0–225)
CO2 SERPL-SCNC: 17 MMOL/L — SIGNIFICANT CHANGE UP (ref 17–32)
CREAT SERPL-MCNC: 0.7 MG/DL — SIGNIFICANT CHANGE UP (ref 0.7–1.5)
EOSINOPHIL # BLD AUTO: 0.07 K/UL — SIGNIFICANT CHANGE UP (ref 0–0.7)
EOSINOPHIL NFR BLD AUTO: 3.4 % — SIGNIFICANT CHANGE UP (ref 0–8)
GLUCOSE SERPL-MCNC: 118 MG/DL — HIGH (ref 70–99)
HCT VFR BLD CALC: 26.1 % — LOW (ref 37–47)
HGB BLD-MCNC: 7.9 G/DL — LOW (ref 12–16)
IMM GRANULOCYTES NFR BLD AUTO: 0 % — LOW (ref 0.1–0.3)
LACTATE SERPL-SCNC: 2 MMOL/L — SIGNIFICANT CHANGE UP (ref 0.5–2.2)
LIDOCAIN IGE QN: 130 U/L — HIGH (ref 7–60)
LYMPHOCYTES # BLD AUTO: 0.74 K/UL — LOW (ref 1.2–3.4)
LYMPHOCYTES # BLD AUTO: 36.5 % — SIGNIFICANT CHANGE UP (ref 20.5–51.1)
MAGNESIUM SERPL-MCNC: 2 MG/DL — SIGNIFICANT CHANGE UP (ref 1.8–2.4)
MCHC RBC-ENTMCNC: 27.3 PG — SIGNIFICANT CHANGE UP (ref 27–31)
MCHC RBC-ENTMCNC: 30.3 G/DL — LOW (ref 32–37)
MCV RBC AUTO: 90.3 FL — SIGNIFICANT CHANGE UP (ref 81–99)
MONOCYTES # BLD AUTO: 0.16 K/UL — SIGNIFICANT CHANGE UP (ref 0.1–0.6)
MONOCYTES NFR BLD AUTO: 7.9 % — SIGNIFICANT CHANGE UP (ref 1.7–9.3)
NEUTROPHILS # BLD AUTO: 1.05 K/UL — LOW (ref 1.4–6.5)
NEUTROPHILS NFR BLD AUTO: 51.7 % — SIGNIFICANT CHANGE UP (ref 42.2–75.2)
NRBC # BLD: 0 /100 WBCS — SIGNIFICANT CHANGE UP (ref 0–0)
NT-PROBNP SERPL-SCNC: 480 PG/ML — HIGH (ref 0–300)
PLATELET # BLD AUTO: 25 K/UL — LOW (ref 130–400)
POTASSIUM SERPL-MCNC: 3.9 MMOL/L — SIGNIFICANT CHANGE UP (ref 3.5–5)
POTASSIUM SERPL-SCNC: 3.9 MMOL/L — SIGNIFICANT CHANGE UP (ref 3.5–5)
PROT SERPL-MCNC: 8.3 G/DL — HIGH (ref 6–8)
RBC # BLD: 2.89 M/UL — LOW (ref 4.2–5.4)
RBC # FLD: 19.9 % — HIGH (ref 11.5–14.5)
SODIUM SERPL-SCNC: 139 MMOL/L — SIGNIFICANT CHANGE UP (ref 135–146)
TROPONIN T SERPL-MCNC: <0.01 NG/ML — SIGNIFICANT CHANGE UP
WBC # BLD: 2.03 K/UL — LOW (ref 4.8–10.8)
WBC # FLD AUTO: 2.03 K/UL — LOW (ref 4.8–10.8)

## 2018-08-06 RX ORDER — SODIUM CHLORIDE 9 MG/ML
2000 INJECTION INTRAMUSCULAR; INTRAVENOUS; SUBCUTANEOUS ONCE
Qty: 0 | Refills: 0 | Status: COMPLETED | OUTPATIENT
Start: 2018-08-06 | End: 2018-08-06

## 2018-08-06 RX ADMIN — SODIUM CHLORIDE 1000 MILLILITER(S): 9 INJECTION INTRAMUSCULAR; INTRAVENOUS; SUBCUTANEOUS at 21:00

## 2018-08-06 NOTE — ED PROVIDER NOTE - NS ED ROS FT
Denies fever/chills, HA, visual changes, dizziness, light headedness, presyncope, LOC, CP, PACHECO, PND, SOB, cough, hemoptysis, abd pain, N/V/D, changes in bowel or bladder habits, LE edema, numbness, weakness, changes in gait.  pt also denies recent travel outside of the country, recent illnesses, sick contacts, recent airplane trips or periods of immobility/bedbound. Denies fever/chills, HA, visual changes, dizziness, light headedness, presyncope, LOC, CP, PACHECO, PND, SOB,  hemoptysis, abd pain, N/V/D, changes in bowel or bladder habits, LE edema, numbness, weakness, changes in gait.  pt also denies recent travel outside of the country, recent illnesses, sick contacts, recent airplane trips or periods of immobility/bedbound.    +cough

## 2018-08-06 NOTE — ED PROVIDER NOTE - CARDIAC, MLM
Normal rate, regular rhythm.  Heart sounds S1, S2.  No murmurs, rubs or gallops.  BU&LE pulses equal

## 2018-08-06 NOTE — ED PROVIDER NOTE - MEDICAL DECISION MAKING DETAILS
All studies reviewed at length w/ son and pt (son as  for daughter).  Pt offered admission for further eval of non-specific weakness.   Pt and son requests d/c.  Per son, pt has had full work-up including blood-work, GI, Cardiology.  PT has f/u stress test scheduled.  Pt denies any complaints and requests d/c.  Pt and son eager to go home. Aware of abnormal vitals: not concerned as within pt's baseline.  Pt and son verbalized return precautions.

## 2018-08-06 NOTE — ED PROVIDER NOTE - PROGRESS NOTE DETAILS
reviewed all labs with pt and gave copy to family- although abnormal at baseline.  Pt says she feels better. on re- examination no abdominal pain.  Son just had recent work up at PCP- Follow with GI- Angel.  Has appt for stress test. pt denies sob or cp.  Admission offer; but they do not want to stay-- will come back for any complications. Pt ambulated w/ normal gait on d/c.  Verbalized return precuations pt re-examination had no tenderness to abdomen.  Pt ambulated and tolerated po intake.  Discussed with pt and son admission; however they turned it down; they rather follow outpatient.  Pt says she feels better and she now says it is likely because she has had decrease po intake. pt re-examination had no tenderness to abdomen.  Pt ambulated and tolerated po intake.  Discussed with pt and son admission; however they turned it down; they rather follow outpatient.  Pt says she feels better and she now says it is likely because she has had decrease po intake. Pt will f/u with PCP, cardio and GI.  Will return for any complications.

## 2018-08-06 NOTE — ED PROVIDER NOTE - OBJECTIVE STATEMENT
56y/o F w/ hx of cirrhosis and htn presents for general weakness since today; no loc.   Pt denies cp, sob, swelling of legs, vomiting, diarrhea, abdominal pain.  +cough, congestion for the past 2-3 days. no travel hx.    GI: Ortiz

## 2018-08-06 NOTE — ED PROVIDER NOTE - ATTENDING CONTRIBUTION TO CARE
Pt to ED w/ son for non-specific weakness x 1-2 days.  Per pt and son, no acute changes in mental status, no focal weakness.  Pt just felt generally weak.  Denies HA/N/V/D/F/C/CONFUSION.    + Hx of cirrhosis: "treated and improved"    ** pt offered  phone, pt requested to use son for translation**  NAD, normal neuro exam.  RRR, BU&LE pulses equal, BCTA, SNTND    Impression: non-specific weakness w/ normal neuro exam.  GI vs UTI vs infection vs dehydration/ Heat exhaustionvs  other    Plan: labs, EKG, IVFS, re-assess

## 2018-08-07 VITALS
SYSTOLIC BLOOD PRESSURE: 95 MMHG | RESPIRATION RATE: 15 BRPM | HEART RATE: 73 BPM | DIASTOLIC BLOOD PRESSURE: 53 MMHG | OXYGEN SATURATION: 99 %

## 2018-08-07 LAB
APPEARANCE UR: ABNORMAL
BILIRUB UR-MCNC: NEGATIVE — SIGNIFICANT CHANGE UP
COLOR SPEC: SIGNIFICANT CHANGE UP
DIFF PNL FLD: NEGATIVE — SIGNIFICANT CHANGE UP
EPI CELLS # UR: ABNORMAL /HPF
GLUCOSE UR QL: NEGATIVE MG/DL — SIGNIFICANT CHANGE UP
KETONES UR-MCNC: NEGATIVE — SIGNIFICANT CHANGE UP
LEUKOCYTE ESTERASE UR-ACNC: ABNORMAL
NITRITE UR-MCNC: NEGATIVE — SIGNIFICANT CHANGE UP
PH UR: 6 — SIGNIFICANT CHANGE UP (ref 5–8)
PROT UR-MCNC: NEGATIVE MG/DL — SIGNIFICANT CHANGE UP
SP GR SPEC: 1.01 — SIGNIFICANT CHANGE UP (ref 1.01–1.03)
UROBILINOGEN FLD QL: 4 MG/DL (ref 0.2–0.2)
WBC UR QL: SIGNIFICANT CHANGE UP /HPF

## 2018-08-07 RX ORDER — AZITHROMYCIN 500 MG/1
1 TABLET, FILM COATED ORAL
Qty: 5 | Refills: 0 | OUTPATIENT
Start: 2018-08-07 | End: 2018-08-11

## 2018-08-07 NOTE — ED ADULT NURSE NOTE - OBJECTIVE STATEMENT
Patient present to ED with complains of weakness and near syncope that started today, denies falls, chest pain, SOB, nausea, vomiting, and  symptoms.

## 2018-08-07 NOTE — ED ADULT NURSE NOTE - NSIMPLEMENTINTERV_GEN_ALL_ED
Implemented All Universal Safety Interventions:  [place] to call system. Call bell, personal items and telephone within reach. Instruct patient to call for assistance. Room bathroom lighting operational. Non-slip footwear when patient is off stretcher. Physically safe environment: no spills, clutter or unnecessary equipment. Stretcher in lowest position, wheels locked, appropriate side rails in place.

## 2018-08-12 LAB
CULTURE RESULTS: SIGNIFICANT CHANGE UP
SPECIMEN SOURCE: SIGNIFICANT CHANGE UP

## 2018-11-21 ENCOUNTER — OUTPATIENT (OUTPATIENT)
Dept: OUTPATIENT SERVICES | Facility: HOSPITAL | Age: 58
LOS: 1 days | Discharge: HOME | End: 2018-11-21

## 2018-11-21 DIAGNOSIS — Z98.890 OTHER SPECIFIED POSTPROCEDURAL STATES: Chronic | ICD-10-CM

## 2018-11-21 DIAGNOSIS — R07.9 CHEST PAIN, UNSPECIFIED: ICD-10-CM

## 2018-11-21 RX ORDER — DOBUTAMINE HCL 250MG/20ML
5 VIAL (ML) INTRAVENOUS
Qty: 1000 | Refills: 0 | Status: DISCONTINUED | OUTPATIENT
Start: 2018-11-21 | End: 2018-12-06

## 2018-12-16 ENCOUNTER — INPATIENT (INPATIENT)
Facility: HOSPITAL | Age: 58
LOS: 4 days | Discharge: HOME | End: 2018-12-21
Attending: INTERNAL MEDICINE | Admitting: INTERNAL MEDICINE

## 2018-12-16 VITALS
HEART RATE: 69 BPM | TEMPERATURE: 98 F | RESPIRATION RATE: 22 BRPM | DIASTOLIC BLOOD PRESSURE: 68 MMHG | SYSTOLIC BLOOD PRESSURE: 123 MMHG | OXYGEN SATURATION: 92 %

## 2018-12-16 DIAGNOSIS — Z98.890 OTHER SPECIFIED POSTPROCEDURAL STATES: Chronic | ICD-10-CM

## 2018-12-16 LAB
ALBUMIN SERPL ELPH-MCNC: 3 G/DL — LOW (ref 3.5–5.2)
ALP SERPL-CCNC: 99 U/L — SIGNIFICANT CHANGE UP (ref 30–115)
ALT FLD-CCNC: 15 U/L — SIGNIFICANT CHANGE UP (ref 0–41)
AMMONIA BLD-MCNC: 28 UMOL/L — SIGNIFICANT CHANGE UP (ref 11–55)
ANION GAP SERPL CALC-SCNC: 14 MMOL/L — SIGNIFICANT CHANGE UP (ref 7–14)
APTT BLD: 40.6 SEC — HIGH (ref 27–39.2)
AST SERPL-CCNC: 40 U/L — SIGNIFICANT CHANGE UP (ref 0–41)
BASOPHILS # BLD AUTO: 0 K/UL — SIGNIFICANT CHANGE UP (ref 0–0.2)
BASOPHILS NFR BLD AUTO: 0 % — SIGNIFICANT CHANGE UP (ref 0–1)
BILIRUB SERPL-MCNC: 0.9 MG/DL — SIGNIFICANT CHANGE UP (ref 0.2–1.2)
BUN SERPL-MCNC: 12 MG/DL — SIGNIFICANT CHANGE UP (ref 10–20)
CALCIUM SERPL-MCNC: 7.9 MG/DL — LOW (ref 8.5–10.1)
CHLORIDE SERPL-SCNC: 105 MMOL/L — SIGNIFICANT CHANGE UP (ref 98–110)
CO2 SERPL-SCNC: 19 MMOL/L — SIGNIFICANT CHANGE UP (ref 17–32)
CREAT SERPL-MCNC: 0.6 MG/DL — LOW (ref 0.7–1.5)
EOSINOPHIL # BLD AUTO: 0 K/UL — SIGNIFICANT CHANGE UP (ref 0–0.7)
EOSINOPHIL NFR BLD AUTO: 0 % — SIGNIFICANT CHANGE UP (ref 0–8)
GLUCOSE SERPL-MCNC: 112 MG/DL — HIGH (ref 70–99)
HCT VFR BLD CALC: 28.7 % — LOW (ref 37–47)
HGB BLD-MCNC: 8.7 G/DL — LOW (ref 12–16)
INR BLD: 1.65 RATIO — HIGH (ref 0.65–1.3)
LACTATE SERPL-SCNC: 1.7 MMOL/L — SIGNIFICANT CHANGE UP (ref 0.5–2.2)
LIDOCAIN IGE QN: 124 U/L — HIGH (ref 7–60)
LYMPHOCYTES # BLD AUTO: 0.26 K/UL — LOW (ref 1.2–3.4)
LYMPHOCYTES # BLD AUTO: 19.8 % — LOW (ref 20.5–51.1)
MCHC RBC-ENTMCNC: 26.6 PG — LOW (ref 27–31)
MCHC RBC-ENTMCNC: 30.3 G/DL — LOW (ref 32–37)
MCV RBC AUTO: 87.8 FL — SIGNIFICANT CHANGE UP (ref 81–99)
MONOCYTES # BLD AUTO: 0.13 K/UL — SIGNIFICANT CHANGE UP (ref 0.1–0.6)
MONOCYTES NFR BLD AUTO: 9.9 % — HIGH (ref 1.7–9.3)
NEUTROPHILS # BLD AUTO: 0.9 K/UL — LOW (ref 1.4–6.5)
NEUTROPHILS NFR BLD AUTO: 67.6 % — SIGNIFICANT CHANGE UP (ref 42.2–75.2)
NT-PROBNP SERPL-SCNC: 253 PG/ML — SIGNIFICANT CHANGE UP (ref 0–300)
PLATELET # BLD AUTO: 50 K/UL — SIGNIFICANT CHANGE UP (ref 130–400)
POTASSIUM SERPL-MCNC: 3.4 MMOL/L — LOW (ref 3.5–5)
POTASSIUM SERPL-SCNC: 3.4 MMOL/L — LOW (ref 3.5–5)
PROT SERPL-MCNC: 8.4 G/DL — HIGH (ref 6–8)
PROTHROM AB SERPL-ACNC: 18.9 SEC — HIGH (ref 9.95–12.87)
RBC # BLD: 3.27 M/UL — LOW (ref 4.2–5.4)
RBC # FLD: 19.1 % — HIGH (ref 11.5–14.5)
SODIUM SERPL-SCNC: 138 MMOL/L — SIGNIFICANT CHANGE UP (ref 135–146)
TROPONIN T SERPL-MCNC: <0.01 NG/ML — SIGNIFICANT CHANGE UP
TYPE + AB SCN PNL BLD: SIGNIFICANT CHANGE UP
WBC # BLD: 1.33 K/UL — LOW (ref 4.8–10.8)
WBC # FLD AUTO: 1.33 K/UL — LOW (ref 4.8–10.8)

## 2018-12-16 RX ORDER — POTASSIUM CHLORIDE 20 MEQ
20 PACKET (EA) ORAL
Qty: 0 | Refills: 0 | Status: COMPLETED | OUTPATIENT
Start: 2018-12-16 | End: 2018-12-17

## 2018-12-16 RX ORDER — PANTOPRAZOLE SODIUM 20 MG/1
40 TABLET, DELAYED RELEASE ORAL
Qty: 0 | Refills: 0 | Status: DISCONTINUED | OUTPATIENT
Start: 2018-12-16 | End: 2018-12-21

## 2018-12-16 RX ORDER — SENNA PLUS 8.6 MG/1
2 TABLET ORAL AT BEDTIME
Qty: 0 | Refills: 0 | Status: DISCONTINUED | OUTPATIENT
Start: 2018-12-16 | End: 2018-12-21

## 2018-12-16 RX ORDER — URSODIOL 250 MG/1
300 TABLET, FILM COATED ORAL EVERY 8 HOURS
Qty: 0 | Refills: 0 | Status: DISCONTINUED | OUTPATIENT
Start: 2018-12-16 | End: 2018-12-21

## 2018-12-16 RX ORDER — HEPARIN SODIUM 5000 [USP'U]/ML
5000 INJECTION INTRAVENOUS; SUBCUTANEOUS EVERY 8 HOURS
Qty: 0 | Refills: 0 | Status: DISCONTINUED | OUTPATIENT
Start: 2018-12-16 | End: 2018-12-19

## 2018-12-16 RX ADMIN — Medication 50 MILLIEQUIVALENT(S): at 23:38

## 2018-12-16 NOTE — H&P ADULT - ASSESSMENT
58 year old female being evaluated for the following conditions      Cirrhosis secondary to Primary biliary cirrhosis with positive AMA  - Abdominal pain has resolved, no signs of encephalopathy currently. Patient has INR > 1.5  - Would recommend patient for liver transplant.  - Continue with Ursodiol for now, avoid Acetaminophen for now.  - Follow up on US of Abdomen, daily INR and Liver function tests  - Dr. Estrada Ortiz hepatologist follows patient outside. He does not follow up at our hospital. Kindly request local GI team in the morning if need arises.  - Propranolol on hold due to low blood pressure, Esophageal varices present and would likely needed banding.  - Obtain liver biopsy results from Dr. Ortiz in the morning    Pancytopenia  - Likely secondary to cirrhosis complicated with portal hypertension and splenomegaly  - Baseline platelet count around 60 k last year, no signs of bleeding, follow up CBC's daily. Transfuse if any signs of bleed or platelet count 10    GI prophylaxis on protonix  DVT prophylaxis on heparin 58 year old female being evaluated for the following conditions      Cirrhosis secondary to Primary biliary cirrhosis with positive AMA  - Abdominal pain has resolved, no signs of encephalopathy currently. Patient has INR > 1.5  - Would recommend patient for liver transplant.  - Continue with Ursodiol for now, avoid Acetaminophen for now.  - Follow up on US of Abdomen, daily INR and Liver function tests  - Dr. Estrada Ortiz hepatologist follows patient outside. He does not follow up at our hospital. Kindly request local GI team in the morning if need arises.  - Propranolol on hold due to low blood pressure, Esophageal varices present and would likely needed banding.  - Obtain liver biopsy results from Dr. Ortiz in the morning  - Her labs in 12/2016 were negative for viral etiology    Pancytopenia  - Likely secondary to cirrhosis complicated with portal hypertension and splenomegaly  - Baseline platelet count around 60 k last year, no signs of bleeding, follow up CBC's daily. Transfuse if any signs of bleed or platelet count 10    GI prophylaxis on protonix  DVT prophylaxis on heparin

## 2018-12-16 NOTE — ED ADULT NURSE NOTE - OBJECTIVE STATEMENT
Pt son reports sudden chest pain with upper back/rib pain. Distended abdomen with tenderness palpable in upper right quadrant. Lungs clear. Pulses equal.

## 2018-12-16 NOTE — H&P ADULT - HISTORY OF PRESENT ILLNESS
58 year old female with pertinent medical history of cirrhosis secondary to likely Primary Biliary Cirrhosis with positive AMA in the past presents to us with worsening of chronic Left upper quadrant pain. She has had chronic abdominal pain, located on RUQ and LUQ and explains that it was it's primarily due to cirrhosis and splenomegaly. This morning the pain in the LUQ worsened from her baseline to almost 10/10 in intensity, sharp in character and radiating in a band like fashion to the back.. No fever, chill, n/v/d. , no sick contact or abnormal diet. In ED, patient had CT scan which showed liver cirrhosis with portal hypertension and splenomegaly. She also had moderate Abdomeno-pelvic Ascites and oesophageal varices. However, she was found to have worsening thrombocytopenia. She was supposed to  visit to her hepatologist the coming week and the sons are trying to arrange for her liver transplant at [facility]. She was diagnosed with PBC in 2003. Most recent Endoscopy was within last 1 year. She denies any history of Upper GI bleed or Lower GI bleed. 58 year old female with pertinent medical history of cirrhosis secondary to likely Primary Biliary Cirrhosis with positive AMA in the past presents to us with worsening of chronic Left upper quadrant pain. She has had chronic abdominal pain, located on RUQ and LUQ and explains that it was it's primarily due to cirrhosis and splenomegaly. This morning the pain in the LUQ worsened from her baseline to almost 10/10 in intensity, sharp in character and radiating in a band like fashion to the back.. She denies any fever, chills, nausea, vomiting and diarrhea. There are no recent sick contacts or abnormal diet patterns are present. In ED, patient had CT scan which showed liver cirrhosis with portal hypertension and splenomegaly. She also had moderate Abdomeno-pelvic Ascites and oesophageal varices. However, she was found to have worsening thrombocytopenia. She was supposed to  visit to her hepatologist the coming week and the sons are trying to arrange for her liver transplant at [facility]. She was diagnosed with PBC in 2003. Most recent Endoscopy was within last 1 year. She denies any history of Upper GI bleed or Lower GI bleed.

## 2018-12-16 NOTE — ED PROVIDER NOTE - NS ED ROS FT
Constitutional: See HPI.  Eyes: No visual changes, eye pain or discharge. No Photophobia  ENMT: No hearing changes, pain, discharge or infections. No neck pain or stiffness. No limited ROM  Cardiac: +sob and edema. + chest pain with exertion.  Respiratory: No cough. No hemoptysis. No history of asthma or RAD.  GI: No nausea, vomiting, diarrhea or abdominal pain.  : No dysuria, frequency or burning. No Discharge  MS: No myalgia, muscle weakness, joint pain or back pain.  Neuro: No headache or weakness. No LOC.  Skin: No skin rash.  Except as documented in the HPI, all other systems are negative.

## 2018-12-16 NOTE — ED PROVIDER NOTE - PHYSICAL EXAMINATION
non toxic appearing, distressed at presentation lying uncomfortably in bed. NCAT EOMI conjunctival pallor. No nasal discharge. MMM. Neck supple, non tender, full ROM. RRR no MRG +S1S2. CTA b/l. Abd s tender, hepatosplenomegaly ND +BS. Ext WWP x4, moving all extremities, 2+ edema. 2+ equal pulses throughout. Cooperative, appropriate.

## 2018-12-16 NOTE — ED ADULT NURSE NOTE - NSIMPLEMENTINTERV_GEN_ALL_ED
Implemented All Universal Safety Interventions:  [place] to call system. Call bell, personal items and telephone within reach. Instruct patient to call for assistance. Room bathroom lighting operational. Non-slip footwear when patient is off stretcher. Physically safe environment: no spills, clutter or unnecessary equipment. Stretcher in lowest position, wheels locked, appropriate side rails in place.

## 2018-12-16 NOTE — ED PROVIDER NOTE - OBJECTIVE STATEMENT
56yo Female, poor historian, has PMH of cirrhosis 2/2 Hep C (s/p treatment), follow dr. Ortiz, hepatologist, htn presenting to ED for chest pain. Family at bedside state patient had substernal chest pain radiating bilaterally to the back without associated N/V, diaphoresis, admits to sob, no fever or chills. no abdominal pain, denies bloody bowel movements. Patient had a recent stress test this year, results pending as per family.

## 2018-12-16 NOTE — H&P ADULT - NSHPPHYSICALEXAM_GEN_ALL_CORE
CONSTITUTIONAL: NAD, well-developed, well-nourished  HEAD:  Atraumatic, normo-cephalic  EYES: EOMI, PERRLA, clear conjunctivae, anicteric sclerae, no nystagmus  NECK: Supple, no JVD, no carotid bruit   CHEST/LUNG: Breath sounds bilaterally  HEART: S1 S2 normal, regular rate and rhythm.  ABDOMEN: Bowel sounds present. Soft, Right Hypochondriac, Epigastric and Left Hypochondriac Region tenderness, Slightly distended. Shifting Dullness. Spleen and liver palpable 2cm below the coastal margin.  EXTREMITIES:  2+ peripheral pulses, no clubbing, cyanosis, or edema, no dupetryan contracture  PSYCH: Normal affect. Cooperative.  NEUROLOGY: AAOx3. No focal deficits. CN II-XII grossly intact  MSK: No joint swelling, redness   SKIN: Normal turgor, no rashes or lesions

## 2018-12-16 NOTE — H&P ADULT - NSHPLABSRESULTS_GEN_ALL_CORE
SUBJECTIVE:    Patient is a 58y old Female who presents with a chief complaint of   Currently admitted to medicine with the primary diagnosis of Chest pain     Today is hospital day . This morning she is resting comfortably in bed and reports no new issues or overnight events.     PAST MEDICAL & SURGICAL HISTORY  Liver cirrhosis  History of appendectomy  History of tonsillectomy    SOCIAL HISTORY:  Negative for smoking/alcohol/drug use.     ALLERGIES:  No Known Allergies    MEDICATIONS:  STANDING MEDICATIONS  heparin  Injectable 5000 Unit(s) SubCutaneous every 8 hours  ursodiol Capsule 300 milliGRAM(s) Oral every 8 hours    PRN MEDICATIONS  senna 2 Tablet(s) Oral at bedtime PRN    VITALS:   T(F): 98.4  HR: 69  BP: 123/68  RR: 22  SpO2: 92%    LABS:                        8.7    1.33  )-----------( 50       ( 16 Dec 2018 17:00 )             28.7     12-16    138  |  105  |  12  ----------------------------<  112<H>  3.4<L>   |  19  |  0.6<L>    Ca    7.9<L>      16 Dec 2018 16:55    TPro  8.4<H>  /  Alb  3.0<L>  /  TBili  0.9  /  DBili  x   /  AST  40  /  ALT  15  /  AlkPhos  99  12-16    PT/INR - ( 16 Dec 2018 16:55 )   PT: 18.90 sec;   INR: 1.65 ratio         PTT - ( 16 Dec 2018 16:55 )  PTT:40.6 sec      Troponin T, Serum: <0.01 ng/mL (12-16-18 @ 16:55)  Lactate, Blood: 1.7 mmol/L (12-16-18 @ 16:55)      CARDIAC MARKERS ( 16 Dec 2018 16:55 )  x     / <0.01 ng/mL / x     / x     / x          RADIOLOGY:    CT Angio Chest Dissection Protocol (12.16.18 @ 17:21) >  No aortic dissection.  Liver cirrhosis, with sequela of portal hypertension including splenomegaly, esophageal varices, and moderate abdominopelvic ascites.  Dilation of the main pulmonary artery up to 3.6 cm, consistent with pulmonary hypertension.

## 2018-12-16 NOTE — ED PROVIDER NOTE - ATTENDING CONTRIBUTION TO CARE
58yo Female, poor historian, has PMH of cirrhosis 2/2 Hep C (s/p treatment), follow dr. Ortiz, hepatologist, htn presenting to ED for chest pain. No n/v/d, no loc, no fever    CONSTITUTIONAL: Well-developed; well-nourished; in no acute distress. Sitting up and providing appropriate history and physical examination  SKIN: skin exam is warm and dry, no acute rash.  HEAD: Normocephalic; atraumatic.  EYES: PERRL, 3 mm bilateral, no nystagmus, EOM intact; conjunctiva and sclera clear.  ENT: No nasal discharge; airway clear.  NECK: Supple; non tender. + full passive ROM in all directions. No JVD  CARD: S1, S2 normal; no murmurs, gallops, or rubs. Regular rate and rhythm. + Symmetric Strong Pulses  RESP: No wheezes, rales or rhonchi. Good air movement bilaterally  ABD: soft; non-distended; non-tender. No Rebound, No Guarding, No signs of peritonitis, No CVA tenderness. No pulsatile abdominal mass. + Strong and Symmetric Pulses  EXT: Normal ROM. No clubbing, cyanosis or edema. Dp and Pt Pulses intact. Cap refill less than 3 seconds  NEURO: CN 2-12 intact, normal finger to nose, normal romberg, stable gait, no sensory or motor deficits, Alert, oriented, grossly unremarkable. No Focal deficits. GCS 15. NIH 0  PSYCH: Cooperative, appropriate.

## 2018-12-16 NOTE — H&P ADULT - ATTENDING COMMENTS
58 yr old female presented with chest pain.  VITAL SIGNS (Last 24 hrs):  T(C): 36.4 (12-17-18 @ 08:06), Max: 36.9 (12-16-18 @ 17:11)  HR: 62 (12-17-18 @ 12:56) (59 - 69)  BP: 117/58 (12-17-18 @ 12:56) (89/50 - 123/68)  RR: 18 (12-17-18 @ 08:06) (18 - 22)  SpO2: 96% (12-17-18 @ 08:06) (92% - 98%)  Wt(kg): --  Daily     Daily     I&O's Summary                        7.8    1.56  )-----------( 27       ( 17 Dec 2018 06:29 )             26.3   12-17    134<L>  |  105  |  11  ----------------------------<  94  4.3   |  18  |  0.7    Ca    7.6<L>      17 Dec 2018 06:29  Phos  2.4     12-17  Mg     1.9     12-17    TPro  7.7  /  Alb  2.6<L>  /  TBili  0.9  /  DBili  0.3<H>  /  AST  35  /  ALT  14  /  AlkPhos  81  12-17  ekg-rate 60/min with RBBB  o/e  aox3  chest-b/l air entry  cvs-s1s2n  abd/gi-distended and slightly tender  no edema  Assessment and plan  # 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 Portal HTN / Esophageal Varices s/p 4 bands / Splenomegaly /  - C/w BB if bp tolerated  - Abdominal u/s shows ascites,  paracentesis bedside or IR  - outpatient f/u with hepatologist Dr Ortiz.  # pancytopenia- sec to cirrhosis 58 yr old female presented with chest pain.  VITAL SIGNS (Last 24 hrs):  T(C): 36.4 (12-17-18 @ 08:06), Max: 36.9 (12-16-18 @ 17:11)  HR: 62 (12-17-18 @ 12:56) (59 - 69)  BP: 117/58 (12-17-18 @ 12:56) (89/50 - 123/68)  RR: 18 (12-17-18 @ 08:06) (18 - 22)  SpO2: 96% (12-17-18 @ 08:06) (92% - 98%)  Wt(kg): --  Daily     Daily     I&O's Summary                        7.8    1.56  )-----------( 27       ( 17 Dec 2018 06:29 )             26.3   12-17    134<L>  |  105  |  11  ----------------------------<  94  4.3   |  18  |  0.7    Ca    7.6<L>      17 Dec 2018 06:29  Phos  2.4     12-17  Mg     1.9     12-17    TPro  7.7  /  Alb  2.6<L>  /  TBili  0.9  /  DBili  0.3<H>  /  AST  35  /  ALT  14  /  AlkPhos  81  12-17  ekg-rate 60/min with RBBB  o/e  aox3  chest-b/l air entry  cvs-s1s2n  abd/gi-distended and slightly tender  no edema  Assessment and plan  # 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 Portal HTN / Esophageal Varices s/p 4 bands / Splenomegaly /  - C/w BB if bp tolerated  - Abdominal u/s shows ascites,  paracentesis bedside or IR  - outpatient f/u with hepatologist Dr Ortiz. Patient has outpatient apoitmnet at [facility] in jan for liver transplant.  # pancytopenia- sec to cirrhosis 58 yr old female presented with chest pain.  VITAL SIGNS (Last 24 hrs):  T(C): 36.4 (12-17-18 @ 08:06), Max: 36.9 (12-16-18 @ 17:11)  HR: 62 (12-17-18 @ 12:56) (59 - 69)  BP: 117/58 (12-17-18 @ 12:56) (89/50 - 123/68)  RR: 18 (12-17-18 @ 08:06) (18 - 22)  SpO2: 96% (12-17-18 @ 08:06) (92% - 98%)  Wt(kg): --  Daily     Daily     I&O's Summary                        7.8    1.56  )-----------( 27       ( 17 Dec 2018 06:29 )             26.3   12-17    134<L>  |  105  |  11  ----------------------------<  94  4.3   |  18  |  0.7    Ca    7.6<L>      17 Dec 2018 06:29  Phos  2.4     12-17  Mg     1.9     12-17    TPro  7.7  /  Alb  2.6<L>  /  TBili  0.9  /  DBili  0.3<H>  /  AST  35  /  ALT  14  /  AlkPhos  81  12-17  ekg-rate 60/min with RBBB  o/e  aox3  chest-b/l air entry  cvs-s1s2n  abd/gi-distended and slightly tender  no edema  Assessment and plan  # 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 Portal HTN / Esophageal Varices s/p 4 bands / Splenomegaly /  - C/w BB if bp tolerated  - Abdominal u/s shows ascites,  paracentesis bedside or IR  - outpatient f/u with hepatologist Dr Ortiz. Patient has outpatient appoitment at [facility] in jan for liver transplant.  # pancytopenia- sec to cirrhosis

## 2018-12-17 PROBLEM — K74.60 UNSPECIFIED CIRRHOSIS OF LIVER: Chronic | Status: INACTIVE | Noted: 2018-02-15 | Resolved: 2018-12-16

## 2018-12-17 PROBLEM — I10 ESSENTIAL (PRIMARY) HYPERTENSION: Chronic | Status: INACTIVE | Noted: 2018-02-15 | Resolved: 2018-12-16

## 2018-12-17 LAB
ALBUMIN SERPL ELPH-MCNC: 2.6 G/DL — LOW (ref 3.5–5.2)
ALP SERPL-CCNC: 81 U/L — SIGNIFICANT CHANGE UP (ref 30–115)
ALT FLD-CCNC: 14 U/L — SIGNIFICANT CHANGE UP (ref 0–41)
AMYLASE P1 CFR SERPL: 85 U/L — SIGNIFICANT CHANGE UP (ref 25–115)
ANION GAP SERPL CALC-SCNC: 11 MMOL/L — SIGNIFICANT CHANGE UP (ref 7–14)
AST SERPL-CCNC: 35 U/L — SIGNIFICANT CHANGE UP (ref 0–41)
BASOPHILS # BLD AUTO: 0.01 K/UL — SIGNIFICANT CHANGE UP (ref 0–0.2)
BASOPHILS NFR BLD AUTO: 0.6 % — SIGNIFICANT CHANGE UP (ref 0–1)
BILIRUB DIRECT SERPL-MCNC: 0.3 MG/DL — HIGH (ref 0–0.2)
BILIRUB INDIRECT FLD-MCNC: 0.6 MG/DL — SIGNIFICANT CHANGE UP (ref 0.2–1.2)
BILIRUB SERPL-MCNC: 0.9 MG/DL — SIGNIFICANT CHANGE UP (ref 0.2–1.2)
BUN SERPL-MCNC: 11 MG/DL — SIGNIFICANT CHANGE UP (ref 10–20)
CALCIUM SERPL-MCNC: 7.6 MG/DL — LOW (ref 8.5–10.1)
CHLORIDE SERPL-SCNC: 105 MMOL/L — SIGNIFICANT CHANGE UP (ref 98–110)
CHOLEST SERPL-MCNC: 63 MG/DL — LOW (ref 100–200)
CO2 SERPL-SCNC: 18 MMOL/L — SIGNIFICANT CHANGE UP (ref 17–32)
CREAT SERPL-MCNC: 0.7 MG/DL — SIGNIFICANT CHANGE UP (ref 0.7–1.5)
EOSINOPHIL # BLD AUTO: 0.03 K/UL — SIGNIFICANT CHANGE UP (ref 0–0.7)
EOSINOPHIL NFR BLD AUTO: 1.9 % — SIGNIFICANT CHANGE UP (ref 0–8)
ESTIMATED AVERAGE GLUCOSE: 88 MG/DL — SIGNIFICANT CHANGE UP (ref 68–114)
ETHANOL SERPL-MCNC: <10 MG/DL — HIGH
GLUCOSE SERPL-MCNC: 94 MG/DL — SIGNIFICANT CHANGE UP (ref 70–99)
HBA1C BLD-MCNC: 4.7 % — SIGNIFICANT CHANGE UP (ref 4–5.6)
HCT VFR BLD CALC: 26.3 % — LOW (ref 37–47)
HDLC SERPL-MCNC: 21 MG/DL — LOW
HGB BLD-MCNC: 7.8 G/DL — LOW (ref 12–16)
IMM GRANULOCYTES NFR BLD AUTO: 0.6 % — HIGH (ref 0.1–0.3)
INR BLD: 1.75 RATIO — HIGH (ref 0.65–1.3)
LACTATE SERPL-SCNC: 1.1 MMOL/L — SIGNIFICANT CHANGE UP (ref 0.5–2.2)
LIDOCAIN IGE QN: 97 U/L — HIGH (ref 7–60)
LIPID PNL WITH DIRECT LDL SERPL: 36 MG/DL — SIGNIFICANT CHANGE UP (ref 4–129)
LYMPHOCYTES # BLD AUTO: 0.33 K/UL — LOW (ref 1.2–3.4)
LYMPHOCYTES # BLD AUTO: 21.2 % — SIGNIFICANT CHANGE UP (ref 20.5–51.1)
MAGNESIUM SERPL-MCNC: 1.9 MG/DL — SIGNIFICANT CHANGE UP (ref 1.8–2.4)
MCHC RBC-ENTMCNC: 26.4 PG — LOW (ref 27–31)
MCHC RBC-ENTMCNC: 29.7 G/DL — LOW (ref 32–37)
MCV RBC AUTO: 88.9 FL — SIGNIFICANT CHANGE UP (ref 81–99)
MONOCYTES # BLD AUTO: 0.11 K/UL — SIGNIFICANT CHANGE UP (ref 0.1–0.6)
MONOCYTES NFR BLD AUTO: 7.1 % — SIGNIFICANT CHANGE UP (ref 1.7–9.3)
NEUTROPHILS # BLD AUTO: 1.07 K/UL — LOW (ref 1.4–6.5)
NEUTROPHILS NFR BLD AUTO: 68.6 % — SIGNIFICANT CHANGE UP (ref 42.2–75.2)
PHOSPHATE SERPL-MCNC: 2.4 MG/DL — SIGNIFICANT CHANGE UP (ref 2.1–4.9)
PLATELET # BLD AUTO: 27 K/UL — LOW (ref 130–400)
POTASSIUM SERPL-MCNC: 4.3 MMOL/L — SIGNIFICANT CHANGE UP (ref 3.5–5)
POTASSIUM SERPL-SCNC: 4.3 MMOL/L — SIGNIFICANT CHANGE UP (ref 3.5–5)
PROT SERPL-MCNC: 7.7 G/DL — SIGNIFICANT CHANGE UP (ref 6–8)
PROTHROM AB SERPL-ACNC: 20 SEC — HIGH (ref 9.95–12.87)
RBC # BLD: 2.96 M/UL — LOW (ref 4.2–5.4)
RBC # FLD: 19.3 % — HIGH (ref 11.5–14.5)
SODIUM SERPL-SCNC: 134 MMOL/L — LOW (ref 135–146)
TOTAL CHOLESTEROL/HDL RATIO MEASUREMENT: 3 RATIO — LOW (ref 4–5.5)
TRIGL SERPL-MCNC: 43 MG/DL — SIGNIFICANT CHANGE UP (ref 10–149)
TSH SERPL-MCNC: 21.42 UIU/ML — HIGH (ref 0.27–4.2)
TYPE + AB SCN PNL BLD: SIGNIFICANT CHANGE UP
WBC # BLD: 1.56 K/UL — LOW (ref 4.8–10.8)
WBC # FLD AUTO: 1.56 K/UL — LOW (ref 4.8–10.8)

## 2018-12-17 RX ORDER — MORPHINE SULFATE 50 MG/1
2 CAPSULE, EXTENDED RELEASE ORAL ONCE
Qty: 0 | Refills: 0 | Status: DISCONTINUED | OUTPATIENT
Start: 2018-12-17 | End: 2018-12-17

## 2018-12-17 RX ADMIN — HEPARIN SODIUM 5000 UNIT(S): 5000 INJECTION INTRAVENOUS; SUBCUTANEOUS at 05:00

## 2018-12-17 RX ADMIN — HEPARIN SODIUM 5000 UNIT(S): 5000 INJECTION INTRAVENOUS; SUBCUTANEOUS at 21:20

## 2018-12-17 RX ADMIN — MORPHINE SULFATE 2 MILLIGRAM(S): 50 CAPSULE, EXTENDED RELEASE ORAL at 21:22

## 2018-12-17 RX ADMIN — URSODIOL 300 MILLIGRAM(S): 250 TABLET, FILM COATED ORAL at 05:00

## 2018-12-17 RX ADMIN — PANTOPRAZOLE SODIUM 40 MILLIGRAM(S): 20 TABLET, DELAYED RELEASE ORAL at 08:42

## 2018-12-17 RX ADMIN — HEPARIN SODIUM 5000 UNIT(S): 5000 INJECTION INTRAVENOUS; SUBCUTANEOUS at 14:29

## 2018-12-17 RX ADMIN — Medication 50 MILLIEQUIVALENT(S): at 04:13

## 2018-12-17 RX ADMIN — URSODIOL 300 MILLIGRAM(S): 250 TABLET, FILM COATED ORAL at 14:29

## 2018-12-17 RX ADMIN — Medication 50 MILLIEQUIVALENT(S): at 01:31

## 2018-12-17 RX ADMIN — URSODIOL 300 MILLIGRAM(S): 250 TABLET, FILM COATED ORAL at 21:20

## 2018-12-17 NOTE — CONSULT NOTE ADULT - ASSESSMENT
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 A ct chest to r/o dissection was performed, it showed splenomegaly, portal htn, esophageal varices, and ascites. Labs showed pancytopenia.    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 A ct chest to r/o dissection was performed, it showed splenomegaly, portal htn, esophageal varices, and ascites. Labs showed pancytopenia.    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 A ct chest to r/o dissection was performed, it showed splenomegaly, portal htn, esophageal varices, and ascites. Labs showed pancytopenia.    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 Portal HTN / Esophagela Varices s/p 4 bands / Splenomegaly / Ascites  - Diagnostic Paracentesis to r/o SBP  - Pulmonary consult for pulmonary htn  - After pulm sees the patient, can begin lasix 40 and aldactone 100 for the ascites and f/u as o/p.    Case was discussed & pt examined with attending

## 2018-12-17 NOTE — PROGRESS NOTE ADULT - SUBJECTIVE AND OBJECTIVE BOX
SUBJECTIVE:Patient is a 58y old  Female who presents with a chief complaint of abdominal pain (16 Dec 2018 22:17)  . Today is hospital day 1d     PAST MEDICAL & SURGICAL HISTORY  PAST MEDICAL & SURGICAL HISTORY:  Liver cirrhosis  History of appendectomy  History of tonsillectomy    SOCIAL HISTORY:    ALLERGIES:  No Known Allergies    MEDICATIONS:  STANDING MEDICATIONS  heparin  Injectable 5000 Unit(s) SubCutaneous every 8 hours  pantoprazole    Tablet 40 milliGRAM(s) Oral before breakfast  ursodiol Capsule 300 milliGRAM(s) Oral every 8 hours    PRN MEDICATIONS  senna 2 Tablet(s) Oral at bedtime PRN    VITALS:   T(F): 97.1  HR: 69  BP: 118/67  RR: 18  SpO2: 98%    LABS:                        7.8    1.56  )-----------( 27       ( 17 Dec 2018 06:29 )             26.3     12-16    138  |  105  |  12  ----------------------------<  112<H>  3.4<L>   |  19  |  0.6<L>    Ca    7.9<L>      16 Dec 2018 16:55    TPro  8.4<H>  /  Alb  3.0<L>  /  TBili  0.9  /  DBili  x   /  AST  40  /  ALT  15  /  AlkPhos  99  12-16    PT/INR - ( 17 Dec 2018 06:29 )   PT: 20.00 sec;   INR: 1.75 ratio         PTT - ( 16 Dec 2018 16:55 )  PTT:40.6 sec      Troponin T, Serum: <0.01 ng/mL (12-16-18 @ 16:55)  Lactate, Blood: 1.7 mmol/L (12-16-18 @ 16:55)      CARDIAC MARKERS ( 16 Dec 2018 16:55 )  x     / <0.01 ng/mL / x     / x     / x          RADIOLOGY:    PHYSICAL EXAM:  GEN:   LUNGS: Clear to auscultation bilaterally   HEART: S1/S2 present. RRR.   ABD: Soft, non-tender, non-distended. Bowel sounds present  EXT: SUBJECTIVE:Patient is a 58y old  Female who presents with a chief complaint of abdominal pain (16 Dec 2018 22:17)  Today is hospital day 1d. Patient seen and examined. NAD. States has mildly improved currently but still present on LUQ in a band like fashion and it feels like a squeezing pain. Patient states she is having decrease oral intake as well. Currently in the process of trying to establish care at [facility] fr liver transplant. Patient follows with Dr. Ortiz, consulted. Abdomen is very distended. Son and patient note that distention has occurred this month. Will follow up with GI today.     PAST MEDICAL & SURGICAL HISTORY  PAST MEDICAL & SURGICAL HISTORY:  Liver cirrhosis  History of appendectomy  History of tonsillectomy    ALLERGIES:  No Known Allergies    MEDICATIONS:  STANDING MEDICATIONS  heparin  Injectable 5000 Unit(s) SubCutaneous every 8 hours  pantoprazole    Tablet 40 milliGRAM(s) Oral before breakfast  ursodiol Capsule 300 milliGRAM(s) Oral every 8 hours    PRN MEDICATIONS  senna 2 Tablet(s) Oral at bedtime PRN    VITALS:   T(F): 97.1  HR: 69  BP: 118/67  RR: 18  SpO2: 98%    LABS:                        7.8    1.56  )-----------( 27       ( 17 Dec 2018 06:29 )             26.3     12-16    138  |  105  |  12  ----------------------------<  112<H>  3.4<L>   |  19  |  0.6<L>    Ca    7.9<L>      16 Dec 2018 16:55    TPro  8.4<H>  /  Alb  3.0<L>  /  TBili  0.9  /  DBili  x   /  AST  40  /  ALT  15  /  AlkPhos  99  12-16  PT/INR - ( 17 Dec 2018 06:29 )   PT: 20.00 sec;   INR: 1.75 ratio    PTT - ( 16 Dec 2018 16:55 )  PTT:40.6 sec  Troponin T, Serum: <0.01 ng/mL (12-16-18 @ 16:55)  Lactate, Blood: 1.7 mmol/L (12-16-18 @ 16:55)  CARDIAC MARKERS ( 16 Dec 2018 16:55 )  x     / <0.01 ng/mL / x     / x     / x        PHYSICAL EXAM:  GEN: NAD  LUNGS: Clear to auscultation bilaterally   HEART: S1/S2 present. RRR.   ABD: distended, tender to palpation in the LUQ.   EXT: no b/l le edema

## 2018-12-17 NOTE — PROGRESS NOTE ADULT - ASSESSMENT
59 yo F with PMF of cirrhosis secondary to likely Primary Biliary Cirrhosis with positive AMA in the past presents to us with worsening of chronic Left upper quadrant pain. She has had chronic abdominal pain, located on RUQ and LUQ and explains that it was it's primarily due to cirrhosis and splenomegaly. This morning the pain in the LUQ worsened from her baseline to almost 10/10 in intensity, sharp in character and radiating in a band like fashion to the back. In ED, patient had CT scan which showed liver cirrhosis with portal hypertension and splenomegaly. She also had moderate Abdomeno-pelvic. Ascites and oesophageal varices. However, she was found to have worsening thrombocytopenia. She was supposed to  visit to her hepatologist the coming week and the sons are trying to arrange for her liver transplant at [facility]. She was diagnosed with PBC in 2003. Most recent Endoscopy was within last 1 year. She denies any history of Upper GI bleed or Lower GI bleed.     ASSESSMENT / PLAN:    # Cirrhosis secondary to Primary biliary cirrhosis with positive AMA  - Patient has INR > 1.5  - Would recommend patient for liver transplant.  - Continue with Ursodiol for now, avoid Acetaminophen for now.  - Follow up on US of Abdomen, daily INR and Liver function tests  - Dr. Estrada Ortiz hepatologist follows patient outside. He does not follow up at our hospital. Kindly request local GI team in the morning if need arises.  - Propranolol on hold due to low blood pressure, Esophageal varices present and would likely needed banding.  - Obtain liver biopsy results from Dr. Ortiz in the morning  - Her labs in 12/2016 were negative for viral etiology    # Pancytopenia  - Likely secondary to cirrhosis complicated with portal hypertension and splenomegaly  - Baseline platelet count around 60 k last year, no signs of bleeding, follow up CBC's daily. Transfuse if any signs of bleed or platelet count 10    GI prophylaxis on protonix  DVT prophylaxis on heparin 59 yo F with PMF of cirrhosis secondary to likely Primary Biliary Cirrhosis with positive AMA in the past presents to us with worsening of chronic Left upper quadrant pain. She has had chronic abdominal pain, located on RUQ and LUQ and explains that it was it's primarily due to cirrhosis and splenomegaly. Yesterday the pain in the LUQ worsened from her baseline to almost 10/10 in intensity, squeezing in character and radiating in a band like fashion to the back. In ED, patient had CT scan which showed liver cirrhosis with portal hypertension and splenomegaly. She also had moderate Abdomeno-pelvic ascites and oesophageal varices. However, she was found to have worsening thrombocytopenia. She was supposed to  visit to her hepatologist the coming week and the sons are trying to arrange for her liver transplant at [facility]. She was diagnosed with PBC in 2003. Most recent Endoscopy was within last 1 year. She denies any history of Upper GI bleed or Lower GI bleed.     ASSESSMENT / PLAN:    # Cirrhosis secondary to Primary biliary cirrhosis with positive AMA  - Patient has INR > 1.5  - Continue with Ursodiol for now, avoid Acetaminophen for now.  - Follow up on US of Abdomen, daily INR and Liver function tests  - Would recommend patient for liver transplant.  - Dr. Estrada Ortiz hepatologist follows patient outside. He does not follow up at our hospital. Inpatient GI team consulted. Spoke with Dr. Ortiz's office. Results to be sent to [facility] for help with further management   - Propranolol on hold due to low blood pressure, Esophageal varices present and would likely needed banding.  - Her labs in 12/2016 were negative for viral etiology    # Pancytopenia  - Likely secondary to cirrhosis complicated with portal hypertension and splenomegaly  - Baseline platelet count around 60 k last year, no signs of bleeding, follow up CBC's daily. Transfuse if any signs of bleed or platelet count 10    GI prophylaxis on protonix  DVT prophylaxis on heparin

## 2018-12-17 NOTE — CONSULT NOTE ADULT - SUBJECTIVE AND OBJECTIVE BOX
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 She says the pain in the LUQ worsened from her baseline to almost 10/10 in intensity, squeezing in character and radiating in a band like fashion to the back, she has also had worsening abdominal distention for one month, she denies any previous history of paracentesis. She is being followed by a hepatologist Dr. Estrada Ortiz who is currently trying to arrange for her liver transplant at [facility]. Her Most recent Endoscopy was within last 1 year, wnl per pt. She denies any history of Upper GI bleed or Lower GI bleed.  A ct chest to r/o dissection was performed, it showed splenomegaly, portal htn, esophageal varices, and ascites.    REVIEW OF SYSTEMS  General:  No fevers  Eyes:  No reported pain   ENT:  No sore throat   NECK: No stiffness   CV:  No chest pain   Resp:  No shortness of breath  GI:  See HPI  :  No dysuria  Muscle:  No aches or weakness  Neuro:  No tingling  Endocrine:  No polyuria  Heme:  No ecchymosis   All other review of systems is negative unless indicated above.    PAST MEDICAL & SURGICAL HISTORY  Liver cirrhosis  History of appendectomy  History of tonsillectomy    SOCIAL HISTORY:  Negative for smoking/alcohol/drug use.     ALLERGIES:  No Known Allergies    MEDICATIONS:  STANDING MEDICATIONS  heparin  Injectable 5000 Unit(s) SubCutaneous every 8 hours  pantoprazole    Tablet 40 milliGRAM(s) Oral before breakfast  ursodiol Capsule 300 milliGRAM(s) Oral every 8 hours    PRN MEDICATIONS  senna 2 Tablet(s) Oral at bedtime PRN    VITALS:   T(F): 97.6  HR: 59  BP: 89/50  RR: 18  SpO2: 96%    PHYSICAL EXAM:  GEN: Appears uncomfortable  LUNGS: Clear to auscultation bilaterally   HEART: S1/S2 present. RRR.   ABD: Distended, slightly tender  EXT: NC/NC/NE/MOON  NEURO: AAOX3    LABS:                        7.8    1.56  )-----------( 27       ( 17 Dec 2018 06:29 )             26.3     12-17    134<L>  |  105  |  11  ----------------------------<  94  4.3   |  18  |  0.7    Ca    7.6<L>      17 Dec 2018 06:29  Phos  2.4     12-17  Mg     1.9     12-17    TPro  7.7  /  Alb  2.6<L>  /  TBili  0.9  /  DBili  0.3<H>  /  AST  35  /  ALT  14  /  AlkPhos  81  12-17    PT/INR - ( 17 Dec 2018 06:29 )   PT: 20.00 sec;   INR: 1.75 ratio         PTT - ( 16 Dec 2018 16:55 )  PTT:40.6 sec      Lactate, Blood: 1.1 mmol/L (12-17-18 @ 06:29)  Troponin T, Serum: <0.01 ng/mL (12-16-18 @ 16:55)  Lactate, Blood: 1.7 mmol/L (12-16-18 @ 16:55)      CARDIAC MARKERS ( 16 Dec 2018 16:55 )  x     / <0.01 ng/mL / x     / x     / x          RADIOLOGY:    EXAM:  CTA CHEST DISSECTION        PROCEDURE DATE:  12/16/2018    INTERPRETATION:  CLINICAL STATEMENT: Chest pain/back pain.  COMPARISON CT: Correlation made with CT of the chest dated 8/7/2013 and   CT of the abdomen and pelvis dated 2/15/2018.  IMPRESSION:    No aortic dissection.    Liver cirrhosis, with sequela of portal hypertension including   splenomegaly, esophageal varices, and moderate abdominopelvic ascites.    Dilation of the main pulmonary artery up to 3.6 cm, consistent with   pulmonary hypertension. 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 She says the pain in the LUQ worsened from her baseline to almost 10/10 in intensity, squeezing in character and radiating in a band like fashion to the back, she has also had worsening abdominal distention for one month, she denies any previous history of paracentesis. She is being followed by a hepatologist Dr. Estrada Ortiz who is currently trying to arrange for her liver transplant at [facility]. Her Most recent Endoscopy was within last 1 year, wnl per pt. She denies any history of Upper GI bleed or Lower GI bleed.  A ct chest to r/o dissection was performed, it showed splenomegaly, portal htn, esophageal varices, and ascites.  a ct scan a/p & us abd sono in 2016 did not show any ascites.    REVIEW OF SYSTEMS  General:  No fevers  Eyes:  No reported pain   ENT:  No sore throat   NECK: No stiffness   CV:  No chest pain   Resp:  No shortness of breath  GI:  See HPI  :  No dysuria  Muscle:  No aches or weakness  Neuro:  No tingling  Endocrine:  No polyuria  Heme:  No ecchymosis   All other review of systems is negative unless indicated above.    PAST MEDICAL & SURGICAL HISTORY  Liver cirrhosis  History of appendectomy  History of tonsillectomy    SOCIAL HISTORY:  Negative for smoking/alcohol/drug use.     ALLERGIES:  No Known Allergies    MEDICATIONS:  STANDING MEDICATIONS  heparin  Injectable 5000 Unit(s) SubCutaneous every 8 hours  pantoprazole    Tablet 40 milliGRAM(s) Oral before breakfast  ursodiol Capsule 300 milliGRAM(s) Oral every 8 hours    PRN MEDICATIONS  senna 2 Tablet(s) Oral at bedtime PRN    VITALS:   T(F): 97.6  HR: 59  BP: 89/50  RR: 18  SpO2: 96%    PHYSICAL EXAM:  GEN: Appears uncomfortable  LUNGS: Clear to auscultation bilaterally   HEART: S1/S2 present. RRR.   ABD: Distended, slightly tender  EXT: NC/NC/NE/MOON  NEURO: AAOX3    LABS:                        7.8    1.56  )-----------( 27       ( 17 Dec 2018 06:29 )             26.3     12-17    134<L>  |  105  |  11  ----------------------------<  94  4.3   |  18  |  0.7    Ca    7.6<L>      17 Dec 2018 06:29  Phos  2.4     12-17  Mg     1.9     12-17    TPro  7.7  /  Alb  2.6<L>  /  TBili  0.9  /  DBili  0.3<H>  /  AST  35  /  ALT  14  /  AlkPhos  81  12-17    PT/INR - ( 17 Dec 2018 06:29 )   PT: 20.00 sec;   INR: 1.75 ratio         PTT - ( 16 Dec 2018 16:55 )  PTT:40.6 sec      Lactate, Blood: 1.1 mmol/L (12-17-18 @ 06:29)  Troponin T, Serum: <0.01 ng/mL (12-16-18 @ 16:55)  Lactate, Blood: 1.7 mmol/L (12-16-18 @ 16:55)      CARDIAC MARKERS ( 16 Dec 2018 16:55 )  x     / <0.01 ng/mL / x     / x     / x          RADIOLOGY:    EXAM:  CTA CHEST DISSECTION        PROCEDURE DATE:  12/16/2018    INTERPRETATION:  CLINICAL STATEMENT: Chest pain/back pain.  COMPARISON CT: Correlation made with CT of the chest dated 8/7/2013 and   CT of the abdomen and pelvis dated 2/15/2018.  IMPRESSION:    No aortic dissection.    Liver cirrhosis, with sequela of portal hypertension including   splenomegaly, esophageal varices, and moderate abdominopelvic ascites.    Dilation of the main pulmonary artery up to 3.6 cm, consistent with   pulmonary hypertension.      Procedure: US ABDOMEN LIMITED   Date of Exam: 12/20/2016 06:03 AM    FINDINGS: LIVER: Echogenic parenchyma with nodular contour measuring approximately 18.6 cm in length. No definite focal mass.   GALLBLADDER/BILIARY TREE: Cholelithiasis. No wall thickening or pericholecystic fluid. Negative sonographic Link's sign. No intrahepatic biliary ductal dilatation. The common bile duct measures 6 mm, which is normal.   PANCREAS: Visualized head is unremarkable. Remainder obscured by overlying bowel gas.   KIDNEY: Right kidney measures 9.8 cm in length. No hydronephrosis, calculi or solid mass.   AORTA/IVC: Visualized proximal portions unremarkable.   ASCITES: None.  IMPRESSION: Cholelithiasis without sonographic evidence for cholecystitis. Cirrhosis.     Procedure: CT ABDOMEN PELVIS W IV CONTRAST ONLY   Date of Exam: 12/20/2016 02:05 AM  CLINICAL STATEMENT: Abdominal pain . Elevated lipase and transaminitis.   IMPRESSION: 1. Common bile duct dilatation up to 0.9 cm without evidence of choledocholithiasis. In the setting of elevated lipase this may be sequelae of a passed choledocholith. 2. Splenomegaly measuring up to 20.6 cm in craniocaudal dimension, not significantly changed since prior CT June 25, 2015. 3. Nodular contour of the liver suspicious for cirrhosis. 4. 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 She says the pain in the LUQ worsened from her baseline to almost 10/10 in intensity, squeezing in character and radiating in a band like fashion to the back, she has also had worsening abdominal distention for one month, she denies any previous history of paracentesis. She is being followed by a hepatologist Dr. Estrada Ortiz who is currently trying to arrange for her liver transplant at [facility]. Her Most recent Endoscopy was 11/29/18 & chronic superficial gastritis, grade b esophageal varices that 4 bands were applied to. She denies any history of Upper GI bleed or Lower GI bleed.  A ct chest to r/o dissection was performed, it showed splenomegaly, portal htn, esophageal varices, and ascites.  a ct scan a/p & us abd sono in 2016 did not show any ascites.    REVIEW OF SYSTEMS  General:  No fevers  Eyes:  No reported pain   ENT:  No sore throat   NECK: No stiffness   CV:  No chest pain   Resp:  No shortness of breath  GI:  See HPI  :  No dysuria  Muscle:  No aches or weakness  Neuro:  No tingling  Endocrine:  No polyuria  Heme:  No ecchymosis   All other review of systems is negative unless indicated above.    PAST MEDICAL & SURGICAL HISTORY  Liver cirrhosis  History of appendectomy  History of tonsillectomy    SOCIAL HISTORY:  Negative for smoking/alcohol/drug use.     ALLERGIES:  No Known Allergies    MEDICATIONS:  STANDING MEDICATIONS  heparin  Injectable 5000 Unit(s) SubCutaneous every 8 hours  pantoprazole    Tablet 40 milliGRAM(s) Oral before breakfast  ursodiol Capsule 300 milliGRAM(s) Oral every 8 hours    PRN MEDICATIONS  senna 2 Tablet(s) Oral at bedtime PRN    VITALS:   T(F): 97.6  HR: 59  BP: 89/50  RR: 18  SpO2: 96%    PHYSICAL EXAM:  GEN: Appears uncomfortable  LUNGS: Clear to auscultation bilaterally   HEART: S1/S2 present. RRR.   ABD: Distended, slightly tender  EXT: NC/NC/NE/MOON  NEURO: AAOX3    LABS:                        7.8    1.56  )-----------( 27       ( 17 Dec 2018 06:29 )             26.3     12-17    134<L>  |  105  |  11  ----------------------------<  94  4.3   |  18  |  0.7    Ca    7.6<L>      17 Dec 2018 06:29  Phos  2.4     12-17  Mg     1.9     12-17    TPro  7.7  /  Alb  2.6<L>  /  TBili  0.9  /  DBili  0.3<H>  /  AST  35  /  ALT  14  /  AlkPhos  81  12-17    PT/INR - ( 17 Dec 2018 06:29 )   PT: 20.00 sec;   INR: 1.75 ratio         PTT - ( 16 Dec 2018 16:55 )  PTT:40.6 sec      Lactate, Blood: 1.1 mmol/L (12-17-18 @ 06:29)  Troponin T, Serum: <0.01 ng/mL (12-16-18 @ 16:55)  Lactate, Blood: 1.7 mmol/L (12-16-18 @ 16:55)      CARDIAC MARKERS ( 16 Dec 2018 16:55 )  x     / <0.01 ng/mL / x     / x     / x          RADIOLOGY:    EXAM:  CTA CHEST DISSECTION        PROCEDURE DATE:  12/16/2018    INTERPRETATION:  CLINICAL STATEMENT: Chest pain/back pain.  COMPARISON CT: Correlation made with CT of the chest dated 8/7/2013 and   CT of the abdomen and pelvis dated 2/15/2018.  IMPRESSION:    No aortic dissection.    Liver cirrhosis, with sequela of portal hypertension including   splenomegaly, esophageal varices, and moderate abdominopelvic ascites.    Dilation of the main pulmonary artery up to 3.6 cm, consistent with   pulmonary hypertension.      Procedure: US ABDOMEN LIMITED   Date of Exam: 12/20/2016 06:03 AM    FINDINGS: LIVER: Echogenic parenchyma with nodular contour measuring approximately 18.6 cm in length. No definite focal mass.   GALLBLADDER/BILIARY TREE: Cholelithiasis. No wall thickening or pericholecystic fluid. Negative sonographic Link's sign. No intrahepatic biliary ductal dilatation. The common bile duct measures 6 mm, which is normal.   PANCREAS: Visualized head is unremarkable. Remainder obscured by overlying bowel gas.   KIDNEY: Right kidney measures 9.8 cm in length. No hydronephrosis, calculi or solid mass.   AORTA/IVC: Visualized proximal portions unremarkable.   ASCITES: None.  IMPRESSION: Cholelithiasis without sonographic evidence for cholecystitis. Cirrhosis.     Procedure: CT ABDOMEN PELVIS W IV CONTRAST ONLY   Date of Exam: 12/20/2016 02:05 AM  CLINICAL STATEMENT: Abdominal pain . Elevated lipase and transaminitis.   IMPRESSION: 1. Common bile duct dilatation up to 0.9 cm without evidence of choledocholithiasis. In the setting of elevated lipase this may be sequelae of a passed choledocholith. 2. Splenomegaly measuring up to 20.6 cm in craniocaudal dimension, not significantly changed since prior CT June 25, 2015. 3. Nodular contour of the liver suspicious for cirrhosis. 4. Nonspecific small amount of fluid in the pelvis and along the right paracolic gutter

## 2018-12-18 LAB
ALBUMIN SERPL ELPH-MCNC: 2.4 G/DL — LOW (ref 3.5–5.2)
ALP SERPL-CCNC: 78 U/L — SIGNIFICANT CHANGE UP (ref 30–115)
ALT FLD-CCNC: 14 U/L — SIGNIFICANT CHANGE UP (ref 0–41)
ANION GAP SERPL CALC-SCNC: 14 MMOL/L — SIGNIFICANT CHANGE UP (ref 7–14)
APTT BLD: 40.7 SEC — HIGH (ref 27–39.2)
AST SERPL-CCNC: 37 U/L — SIGNIFICANT CHANGE UP (ref 0–41)
BASOPHILS # BLD AUTO: 0.01 K/UL — SIGNIFICANT CHANGE UP (ref 0–0.2)
BASOPHILS NFR BLD AUTO: 0.6 % — SIGNIFICANT CHANGE UP (ref 0–1)
BILIRUB DIRECT SERPL-MCNC: 0.4 MG/DL — HIGH (ref 0–0.2)
BILIRUB INDIRECT FLD-MCNC: 0.8 MG/DL — SIGNIFICANT CHANGE UP (ref 0.2–1.2)
BILIRUB SERPL-MCNC: 1.2 MG/DL — SIGNIFICANT CHANGE UP (ref 0.2–1.2)
BUN SERPL-MCNC: 11 MG/DL — SIGNIFICANT CHANGE UP (ref 10–20)
CALCIUM SERPL-MCNC: 7.4 MG/DL — LOW (ref 8.5–10.1)
CHLORIDE SERPL-SCNC: 106 MMOL/L — SIGNIFICANT CHANGE UP (ref 98–110)
CO2 SERPL-SCNC: 18 MMOL/L — SIGNIFICANT CHANGE UP (ref 17–32)
CREAT SERPL-MCNC: 0.7 MG/DL — SIGNIFICANT CHANGE UP (ref 0.7–1.5)
EOSINOPHIL # BLD AUTO: 0.05 K/UL — SIGNIFICANT CHANGE UP (ref 0–0.7)
EOSINOPHIL NFR BLD AUTO: 3.2 % — SIGNIFICANT CHANGE UP (ref 0–8)
GLUCOSE SERPL-MCNC: 80 MG/DL — SIGNIFICANT CHANGE UP (ref 70–99)
HCT VFR BLD CALC: 27.7 % — LOW (ref 37–47)
HGB BLD-MCNC: 8.3 G/DL — LOW (ref 12–16)
IMM GRANULOCYTES NFR BLD AUTO: 0 % — LOW (ref 0.1–0.3)
INR BLD: 1.58 RATIO — HIGH (ref 0.65–1.3)
LYMPHOCYTES # BLD AUTO: 0.56 K/UL — LOW (ref 1.2–3.4)
LYMPHOCYTES # BLD AUTO: 35.9 % — SIGNIFICANT CHANGE UP (ref 20.5–51.1)
MAGNESIUM SERPL-MCNC: 1.8 MG/DL — SIGNIFICANT CHANGE UP (ref 1.8–2.4)
MCHC RBC-ENTMCNC: 26.4 PG — LOW (ref 27–31)
MCHC RBC-ENTMCNC: 30 G/DL — LOW (ref 32–37)
MCV RBC AUTO: 88.2 FL — SIGNIFICANT CHANGE UP (ref 81–99)
MONOCYTES # BLD AUTO: 0.15 K/UL — SIGNIFICANT CHANGE UP (ref 0.1–0.6)
MONOCYTES NFR BLD AUTO: 9.6 % — HIGH (ref 1.7–9.3)
NEUTROPHILS # BLD AUTO: 0.79 K/UL — LOW (ref 1.4–6.5)
NEUTROPHILS NFR BLD AUTO: 50.7 % — SIGNIFICANT CHANGE UP (ref 42.2–75.2)
NRBC # BLD: 0 /100 WBCS — SIGNIFICANT CHANGE UP (ref 0–0)
PLATELET # BLD AUTO: 25 K/UL — LOW (ref 130–400)
POTASSIUM SERPL-MCNC: 4.1 MMOL/L — SIGNIFICANT CHANGE UP (ref 3.5–5)
POTASSIUM SERPL-SCNC: 4.1 MMOL/L — SIGNIFICANT CHANGE UP (ref 3.5–5)
PROT SERPL-MCNC: 7.6 G/DL — SIGNIFICANT CHANGE UP (ref 6–8)
PROTHROM AB SERPL-ACNC: 18.1 SEC — HIGH (ref 9.95–12.87)
RBC # BLD: 3.14 M/UL — LOW (ref 4.2–5.4)
RBC # FLD: 19.6 % — HIGH (ref 11.5–14.5)
SODIUM SERPL-SCNC: 138 MMOL/L — SIGNIFICANT CHANGE UP (ref 135–146)
WBC # BLD: 1.56 K/UL — LOW (ref 4.8–10.8)
WBC # FLD AUTO: 1.56 K/UL — LOW (ref 4.8–10.8)

## 2018-12-18 RX ORDER — SPIRONOLACTONE 25 MG/1
100 TABLET, FILM COATED ORAL DAILY
Qty: 0 | Refills: 0 | Status: DISCONTINUED | OUTPATIENT
Start: 2018-12-18 | End: 2018-12-19

## 2018-12-18 RX ORDER — LEVOTHYROXINE SODIUM 125 MCG
75 TABLET ORAL DAILY
Qty: 0 | Refills: 0 | Status: DISCONTINUED | OUTPATIENT
Start: 2018-12-18 | End: 2018-12-18

## 2018-12-18 RX ORDER — FUROSEMIDE 40 MG
40 TABLET ORAL DAILY
Qty: 0 | Refills: 0 | Status: DISCONTINUED | OUTPATIENT
Start: 2018-12-18 | End: 2018-12-19

## 2018-12-18 RX ORDER — PROPRANOLOL HCL 160 MG
10 CAPSULE, EXTENDED RELEASE 24HR ORAL
Qty: 0 | Refills: 0 | Status: DISCONTINUED | OUTPATIENT
Start: 2018-12-19 | End: 2018-12-21

## 2018-12-18 RX ADMIN — SPIRONOLACTONE 100 MILLIGRAM(S): 25 TABLET, FILM COATED ORAL at 11:42

## 2018-12-18 RX ADMIN — HEPARIN SODIUM 5000 UNIT(S): 5000 INJECTION INTRAVENOUS; SUBCUTANEOUS at 21:17

## 2018-12-18 RX ADMIN — URSODIOL 300 MILLIGRAM(S): 250 TABLET, FILM COATED ORAL at 21:17

## 2018-12-18 RX ADMIN — PANTOPRAZOLE SODIUM 40 MILLIGRAM(S): 20 TABLET, DELAYED RELEASE ORAL at 05:39

## 2018-12-18 RX ADMIN — URSODIOL 300 MILLIGRAM(S): 250 TABLET, FILM COATED ORAL at 13:07

## 2018-12-18 RX ADMIN — Medication 40 MILLIGRAM(S): at 11:42

## 2018-12-18 RX ADMIN — URSODIOL 300 MILLIGRAM(S): 250 TABLET, FILM COATED ORAL at 05:39

## 2018-12-18 NOTE — CONSULT NOTE ADULT - SUBJECTIVE AND OBJECTIVE BOX
Patient is a 58y old  Female who presents with a chief complaint of abdominal pain (17 Dec 2018 12:52)      HPI:  58 year old female with pertinent medical history of cirrhosis secondary to likely Primary Biliary Cirrhosis with positive AMA in the past presents to us with worsening of chronic Left upper quadrant pain.  In ED, patient had CT scan which showed liver cirrhosis with portal hypertension and splenomegaly. She also had moderate Abdomeno-pelvic Ascites and oesophageal varices. However, she was found to have worsening thrombocytopenia. She was supposed to  visit to her hepatologist the coming week and the sons are trying to arrange for her liver transplant at [facility].       PAST MEDICAL & SURGICAL HISTORY:  Liver cirrhosis  History of appendectomy  History of tonsillectomy      SOCIAL HX:   Smoking active 1pack every 2 days for 30 years                        ETOH   neg                         Other    FAMILY HISTORY:  :  No known cardiovacular family hisotry     ROS:  See HPI     Allergies    No Known Allergies    Intolerances          PHYSICAL EXAM    ICU Vital Signs Last 24 Hrs  T(C): 36.5 (18 Dec 2018 04:45), Max: 36.5 (18 Dec 2018 04:45)  T(F): 97.7 (18 Dec 2018 04:45), Max: 97.7 (18 Dec 2018 04:45)  HR: 74 (18 Dec 2018 04:45) (62 - 77)  BP: 98/53 (18 Dec 2018 04:45) (98/47 - 117/58)  BP(mean): --  ABP: --  ABP(mean): --  RR: 18 (18 Dec 2018 04:45) (18 - 20)  SpO2: 99% (17 Dec 2018 16:15) (99% - 99%)      General: In NAD   HEENT:  HEMA              Lymphatic system: No cervical LN   Lungs: Bilateral BS, clear bilateraally  Cardiovascular: Regular  Gastrointestinal: Soft, Positive BS  Musculoskeletal: No clubbing.  Moves all extremities.  Full range of motion   Skin: Warm.  Intact  Neurological: No motor or sensory deficit         LABS:                          7.8    1.56  )-----------( 27       ( 17 Dec 2018 06:29 )             26.3                                               12-17    134<L>  |  105  |  11  ----------------------------<  94  4.3   |  18  |  0.7    Ca    7.6<L>      17 Dec 2018 06:29  Phos  2.4     12-17  Mg     1.9     12-17    TPro  7.7  /  Alb  2.6<L>  /  TBili  0.9  /  DBili  0.3<H>  /  AST  35  /  ALT  14  /  AlkPhos  81  12-17      PT/INR - ( 17 Dec 2018 06:29 )   PT: 20.00 sec;   INR: 1.75 ratio         PTT - ( 16 Dec 2018 16:55 )  PTT:40.6 sec                                           CARDIAC MARKERS ( 16 Dec 2018 16:55 )  x     / <0.01 ng/mL / x     / x     / x                                                LIVER FUNCTIONS - ( 17 Dec 2018 06:29 )  Alb: 2.6 g/dL / Pro: 7.7 g/dL / ALK PHOS: 81 U/L / ALT: 14 U/L / AST: 35 U/L / GGT: x                                                                                                                                       X-Rays  prominent hilum, increased interstitial markings                                                                              ECHO pending    MEDICATIONS  (STANDING):  heparin  Injectable 5000 Unit(s) SubCutaneous every 8 hours  levothyroxine 75 MICROGram(s) Oral daily  pantoprazole    Tablet 40 milliGRAM(s) Oral before breakfast  ursodiol Capsule 300 milliGRAM(s) Oral every 8 hours    MEDICATIONS  (PRN):  senna 2 Tablet(s) Oral at bedtime PRN Constipation

## 2018-12-18 NOTE — PROGRESS NOTE ADULT - SUBJECTIVE AND OBJECTIVE BOX
DAILY PROGRESS NOTE  ===========================================================  Patient Information:   Hospital Day:</TOO EPSTEIN  /  58y  /  Female  /b> 2d /  MRN#: [id number]  Working / Admitting Diagnosis:  1. Abdominal pain    |:::::::::::::::::::::::::::::| SUBJECTIVE |:::::::::::::::::::::::::::::::|  OVERNIGHT EVENTS:   Here for abdominal pain chest pain, ACS ruled out. Known cirrhosis 2/2 PBC  No acute events noted.  Denies chest pain / SOB / palpitations / Nausea / Vomiting / constipation / diarrhea or abdominal pain.   ROS otherwise negative.    Past Medical History:   Liver cirrhosis 2/2 PBC  HTN (hypertension)  History of appendectomy  History of tonsillectomy    ALLERGIES:  No Known Allergies    HOME MEDICATIONS:  propranolol 20 mg oral tablet: 1 tab(s) orally 2 times a day (15 Feb 2018 10:53)  ursodiol 300 mg oral tablet: 1 tab(s) orally 3 times a day (15 Feb 2018 10:53)      |:::::::::::::::::::::::::::| OBJECTIVE |:::::::::::::::::::::::::::|    VITAL SIGNS: Last 24 Hours  T(F): 97.7 (18 Dec 2018 04:45), Max: 97.7 (18 Dec 2018 01:54)  HR: 74 (18 Dec 2018 04:45) (70 - 77)  BP: 98/53 (18 Dec 2018 04:45) (98/47 - 101/47)  RR: 18 (18 Dec 2018 04:45) (18 - 20)  SpO2: 92% (18 Dec 2018 08:52) (92% - 99%)    PHYSICAL EXAM:  GENERAL:   Awake, alert; NAD.  HEENT:  Head NC/AT;   NECK:   Supple.  CARDIO:   RRR; S1 & S2 audible  RESP:  No respiratory distress or accessory muscle use. CTAB  GI:   Soft mildly disnteded/ Non tender / No guarding; No rebound tenderness.  EXT:   Without any cyanosis, clubbing, rash, lesions or 1+ pitting edema     LAB RESULTS:                        8.3    1.56  )-----------( 25       ( 18 Dec 2018 08:37 )             27.7     12-18    138  |  106  |  11  ----------------------------<  80  4.1   |  18  |  0.7    Ca    7.4<L>      18 Dec 2018 08:37  Phos  2.4     12-17  Mg     1.8     12-18    TPro  7.6  /  Alb  2.4<L>  /  TBili  1.2  /  DBili  0.4<H>  /  AST  37  /  ALT  14  /  AlkPhos  78  12-18    PT/INR - ( 18 Dec 2018 08:37 )   PT: 18.10 sec;   INR: 1.58 ratio    PTT - ( 18 Dec 2018 08:37 )  PTT:40.7 sec    CARDIAC MARKERS ( 16 Dec 2018 16:55 )  x     / <0.01 ng/mL / x     / x     / x          MICROBIOLOGY:    Culture - Blood (collected 17 Dec 2018 06:29)  Source: .Blood None  Preliminary Report (18 Dec 2018 13:02):    No growth to date.    RADIOLOGY:  < from: CT Abdomen and Pelvis w/ IV Cont (02.15.18 @ 03:37) >  1. Findings suggestive of cecitis. Differential includes infectious and   inflammatory etiologies.  2. Cholelithiasis and dilated gallbladder, correlate clinically for signs   of cholecystitis.  3. Splenomegaly and esophageal varices are suggestive of sequelae of   portal hypertension.      < from: US Abdomen Limited (12.17.18 @ 12:25) >  IMPRESSION:    Hepatic cirrhosis with increased ascites.    Borderline dilated common bile duct, stable.    Cholelithiasis.    < end of copied text >        INPATIENT MEDICATIONS:  furosemide    Tablet 40 milliGRAM(s) Oral daily  heparin  Injectable 5000 Unit(s) SubCutaneous every 8 hours  pantoprazole    Tablet 40 milliGRAM(s) Oral before breakfast  spironolactone 100 milliGRAM(s) Oral daily  ursodiol Capsule 300 milliGRAM(s) Oral every 8 hours    PRN MEDICATIONS  senna 2 Tablet(s) Oral at bedtime PRN    ------------------------------------------------------------------------------------------------------------

## 2018-12-18 NOTE — PROGRESS NOTE ADULT - ASSESSMENT
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 A ct chest to r/o dissection was performed, it showed splenomegaly, portal htn, esophageal varices, and ascites. Troponin /EKG negative. Labs showed pancytopenia.    # Primary Biliary Cirrhosis  # Pancytopenia / Portal HTN / Esophageal Varices s/p band / Splenomegaly / Moderate Ascites secondary PBC.   # Ct showing cecitis    - Diagnostic Paracentesis to r/o SBP. IR consulted. Will gave PLT today and reassess for tap tomorrow.   - Lasix 40 and Aldactone 100. Hold for SBP<90  - Propranolol hold SBP<90 HR< 55, for ppx Esophageal varices present and would likely needed banding.  - Patient has INR > 1.5. Monitor   - Continue with Ursodiol for now, avoid Hepatoxic agents  - Dr. Estrada Ortiz hepatologist follows patient outside. Pt supposed to follow for transplant in [facility]  - Her labs in 12/2016 were negative for viral etiology  - Baseline platelet count around 60 k last year, no signs of bleeding    # Pulmonary HTN likely 2/2 portopulmonary congestion  - 2d Echo ordered  - Needs outpatient PFTs per Pulm    # GI PPx - (X)Protonix () Not indicated  # DVT PPx - (X) Heparin 5000 mg SubQ    # Activity -  (X) Increase as Tolerated   # Dispo -   Patient to be discharged when medically optimized.  # Code Status - (X) FULL    () DNR / DNI    (X) Discussed Case and Plan with the Medical Attending.    Please call Dr. Wesley with any questions/ recommendations: Spectra #[phone]

## 2018-12-18 NOTE — CONSULT NOTE ADULT - ASSESSMENT
IMPRESSION:    Primary biliary cirrhosis  Possible pulmonary hypertension, likely portopulmonary in origin  Likely has undiagnosed chronic obstructive lung disease related to chronic tobacco use        PLAN:    Get 2d echo to estimate pulmonary artery systolic pressure, and rule out any left sided heart disease  GI evaluation for cirrhosis and ascites management  Avoid fluid overload, start diuretics  Needs outpatient PFTs  Smoking cessation counseling offered  DVT prophylaxis  Will follow IMPRESSION:    Primary biliary cirrhosis  Possible pulmonary hypertension, likely portopulmonary in origin  Likely has undiagnosed chronic obstructive lung disease related to chronic tobacco use        PLAN:    Get 2d echo to estimate pulmonary artery systolic pressure, and rule out any left sided heart disease  GI evaluation for cirrhosis and ascites management  Avoid fluid overload, start diuretics  Needs outpatient PFTs  Smoking cessation counseling offered  DVT prophylaxis  Needs FU with Liver transplant team

## 2018-12-18 NOTE — PROGRESS NOTE ADULT - ATTENDING COMMENTS
patient seen and examined independently on morning rounds, chart reviewed and discussed with the medicine resident and agree with the above resident progress note with the following addendum:    GI following--patient has close f/u as outpatient (including GI/Hepatology team for eventual liver transplant)  - platelet transfusion (plt today 25k) no signs of active bleeding---still with abdominal pain and distension--f/u with IR for possible paracentesis tomorrow  -monitor bp closely while started on lasix and aldactone (hold if sbp<90)

## 2018-12-18 NOTE — CONSULT NOTE ADULT - SUBJECTIVE AND OBJECTIVE BOX
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 She has had chronic abdominal pain, located on RUQ and LUQ and explains that it was it's primarily due to cirrhosis and splenomegaly. This morning the pain in the LUQ worsened from her baseline to almost 10/10 in intensity, sharp in character and radiating in a band like fashion to the back.. She denies any fever, chills, nausea, vomiting and diarrhea. There are no recent sick contacts or abnormal diet patterns are present. In ED, patient had CT scan which showed liver cirrhosis with portal hypertension and splenomegaly. She also had moderate Abdomeno-pelvic Ascites and oesophageal varices. However, she was found to have worsening thrombocytopenia. She was supposed to  visit to her hepatologist the coming week and the sons are trying to arrange for her liver transplant at [facility]. She was diagnosed with PBC in 2003. Most recent Endoscopy was within last 1 year. She denies any history of Upper GI bleed or Lower GI bleed. (16 Dec 2018 22:17)      PAST MEDICAL & SURGICAL HISTORY:  Liver cirrhosis  History of appendectomy  History of tonsillectomy      MEDICATIONS  (STANDING):  furosemide    Tablet 40 milliGRAM(s) Oral daily  heparin  Injectable 5000 Unit(s) SubCutaneous every 8 hours  pantoprazole    Tablet 40 milliGRAM(s) Oral before breakfast  spironolactone 100 milliGRAM(s) Oral daily  ursodiol Capsule 300 milliGRAM(s) Oral every 8 hours    MEDICATIONS  (PRN):  senna 2 Tablet(s) Oral at bedtime PRN Constipation      Allergies    No Known Allergies    Intolerances        Social History:   Smoking: Yes [ ]  No [ ]   ______pk yrs  ETOH  Yes [ ]  No [ ]  Social [ ]  DRUGS:  Yes [ ]  No [ ]  if so what______________    FAMILY HISTORY:      Physical Exam:   Vital Signs Last 24 Hrs  T(C): 36.5 (18 Dec 2018 04:45), Max: 36.5 (18 Dec 2018 01:54)  T(F): 97.7 (18 Dec 2018 04:45), Max: 97.7 (18 Dec 2018 01:54)  HR: 74 (18 Dec 2018 04:45) (62 - 77)  BP: 98/53 (18 Dec 2018 04:45) (98/47 - 117/58)  BP(mean): --  RR: 18 (18 Dec 2018 04:45) (18 - 20)  SpO2: 92% (18 Dec 2018 08:52) (92% - 99%)      Labs:                         8.3    1.56  )-----------( 25       ( 18 Dec 2018 08:37 )             27.7     12-18    138  |  106  |  11  ----------------------------<  80  4.1   |  18  |  0.7    Ca    7.4<L>      18 Dec 2018 08:37  Phos  2.4     12-17  Mg     1.8     12-18    TPro  7.6  /  Alb  2.4<L>  /  TBili  1.2  /  DBili  0.4<H>  /  AST  37  /  ALT  14  /  AlkPhos  78  12-18    PT/INR - ( 18 Dec 2018 08:37 )   PT: 18.10 sec;   INR: 1.58 ratio         PTT - ( 18 Dec 2018 08:37 )  PTT:40.7 sec    Pertinent labs:                      8.3    1.56  )-----------( 25       ( 18 Dec 2018 08:37 )             27.7       12-18    138  |  106  |  11  ----------------------------<  80  4.1   |  18  |  0.7    Ca    7.4<L>      18 Dec 2018 08:37  Phos  2.4     12-17  Mg     1.8     12-18    TPro  7.6  /  Alb  2.4<L>  /  TBili  1.2  /  DBili  0.4<H>  /  AST  37  /  ALT  14  /  AlkPhos  78  12-18      PT/INR - ( 18 Dec 2018 08:37 )   PT: 18.10 sec;   INR: 1.58 ratio         PTT - ( 18 Dec 2018 08:37 )  PTT:40.7 sec    Radiology & Additional Studies:     < from: CT Angio Chest Dissection Protocol (12.16.18 @ 17:21) >  IMPRESSION:    No aortic dissection.    Liver cirrhosis, with sequela of portal hypertension including   splenomegaly, esophageal varices, and moderate abdominopelvic ascites.    Dilation of the main pulmonary artery up to 3.6 cm, consistent with   pulmonary hypertension.    < end of copied text >      < from: US Abdomen Limited (12.17.18 @ 12:25) >  IMPRESSION:    Hepatic cirrhosis with increased ascites.    Borderline dilated common bile duct, stable.    Cholelithiasis.    < end of copied text >    Radiology imaging reviewed.       ASSESSMENT/ PLAN:     Pt high risk for paracentesis.  Replace plts.  Reevaluate tomorrow.  D/W medicine team.    Thank you for the courtesy of this consult, please call [phone] with any further questions.

## 2018-12-19 LAB
ANION GAP SERPL CALC-SCNC: 14 MMOL/L — SIGNIFICANT CHANGE UP (ref 7–14)
BASOPHILS # BLD AUTO: 0.01 K/UL — SIGNIFICANT CHANGE UP (ref 0–0.2)
BASOPHILS NFR BLD AUTO: 0.7 % — SIGNIFICANT CHANGE UP (ref 0–1)
BUN SERPL-MCNC: 11 MG/DL — SIGNIFICANT CHANGE UP (ref 10–20)
CALCIUM SERPL-MCNC: 7.7 MG/DL — LOW (ref 8.5–10.1)
CHLORIDE SERPL-SCNC: 105 MMOL/L — SIGNIFICANT CHANGE UP (ref 98–110)
CO2 SERPL-SCNC: 20 MMOL/L — SIGNIFICANT CHANGE UP (ref 17–32)
CREAT SERPL-MCNC: 0.8 MG/DL — SIGNIFICANT CHANGE UP (ref 0.7–1.5)
EOSINOPHIL # BLD AUTO: 0.04 K/UL — SIGNIFICANT CHANGE UP (ref 0–0.7)
EOSINOPHIL NFR BLD AUTO: 2.7 % — SIGNIFICANT CHANGE UP (ref 0–8)
GLUCOSE SERPL-MCNC: 108 MG/DL — HIGH (ref 70–99)
HCT VFR BLD CALC: 25.6 % — LOW (ref 37–47)
HGB BLD-MCNC: 7.6 G/DL — LOW (ref 12–16)
IMM GRANULOCYTES NFR BLD AUTO: 0 % — LOW (ref 0.1–0.3)
LYMPHOCYTES # BLD AUTO: 0.37 K/UL — LOW (ref 1.2–3.4)
LYMPHOCYTES # BLD AUTO: 25 % — SIGNIFICANT CHANGE UP (ref 20.5–51.1)
MAGNESIUM SERPL-MCNC: 1.8 MG/DL — SIGNIFICANT CHANGE UP (ref 1.8–2.4)
MCHC RBC-ENTMCNC: 26 PG — LOW (ref 27–31)
MCHC RBC-ENTMCNC: 29.7 G/DL — LOW (ref 32–37)
MCV RBC AUTO: 87.7 FL — SIGNIFICANT CHANGE UP (ref 81–99)
MONOCYTES # BLD AUTO: 0.19 K/UL — SIGNIFICANT CHANGE UP (ref 0.1–0.6)
MONOCYTES NFR BLD AUTO: 12.8 % — HIGH (ref 1.7–9.3)
NEUTROPHILS # BLD AUTO: 0.87 K/UL — LOW (ref 1.4–6.5)
NEUTROPHILS NFR BLD AUTO: 58.8 % — SIGNIFICANT CHANGE UP (ref 42.2–75.2)
NRBC # BLD: 0 /100 WBCS — SIGNIFICANT CHANGE UP (ref 0–0)
PHOSPHATE SERPL-MCNC: 3 MG/DL — SIGNIFICANT CHANGE UP (ref 2.1–4.9)
PLATELET # BLD AUTO: 28 K/UL — LOW (ref 130–400)
POTASSIUM SERPL-MCNC: 3.6 MMOL/L — SIGNIFICANT CHANGE UP (ref 3.5–5)
POTASSIUM SERPL-SCNC: 3.6 MMOL/L — SIGNIFICANT CHANGE UP (ref 3.5–5)
RBC # BLD: 2.92 M/UL — LOW (ref 4.2–5.4)
RBC # FLD: 19.3 % — HIGH (ref 11.5–14.5)
SODIUM SERPL-SCNC: 139 MMOL/L — SIGNIFICANT CHANGE UP (ref 135–146)
T3 SERPL-MCNC: 84 NG/DL — SIGNIFICANT CHANGE UP (ref 80–200)
T4 AB SER-ACNC: 3.8 UG/DL — LOW (ref 4.6–12)
WBC # BLD: 1.48 K/UL — LOW (ref 4.8–10.8)
WBC # FLD AUTO: 1.48 K/UL — LOW (ref 4.8–10.8)

## 2018-12-19 RX ORDER — FUROSEMIDE 40 MG
20 TABLET ORAL DAILY
Qty: 0 | Refills: 0 | Status: DISCONTINUED | OUTPATIENT
Start: 2018-12-19 | End: 2018-12-21

## 2018-12-19 RX ORDER — MORPHINE SULFATE 50 MG/1
2 CAPSULE, EXTENDED RELEASE ORAL EVERY 12 HOURS
Qty: 0 | Refills: 0 | Status: DISCONTINUED | OUTPATIENT
Start: 2018-12-19 | End: 2018-12-20

## 2018-12-19 RX ORDER — LEVOTHYROXINE SODIUM 125 MCG
50 TABLET ORAL DAILY
Qty: 0 | Refills: 0 | Status: DISCONTINUED | OUTPATIENT
Start: 2018-12-19 | End: 2018-12-21

## 2018-12-19 RX ORDER — SPIRONOLACTONE 25 MG/1
100 TABLET, FILM COATED ORAL DAILY
Qty: 0 | Refills: 0 | Status: DISCONTINUED | OUTPATIENT
Start: 2018-12-20 | End: 2018-12-20

## 2018-12-19 RX ADMIN — URSODIOL 300 MILLIGRAM(S): 250 TABLET, FILM COATED ORAL at 05:06

## 2018-12-19 RX ADMIN — PANTOPRAZOLE SODIUM 40 MILLIGRAM(S): 20 TABLET, DELAYED RELEASE ORAL at 09:37

## 2018-12-19 RX ADMIN — URSODIOL 300 MILLIGRAM(S): 250 TABLET, FILM COATED ORAL at 21:13

## 2018-12-19 RX ADMIN — URSODIOL 300 MILLIGRAM(S): 250 TABLET, FILM COATED ORAL at 13:35

## 2018-12-19 RX ADMIN — Medication 20 MILLIGRAM(S): at 13:35

## 2018-12-19 RX ADMIN — MORPHINE SULFATE 2 MILLIGRAM(S): 50 CAPSULE, EXTENDED RELEASE ORAL at 19:46

## 2018-12-19 RX ADMIN — Medication 50 MICROGRAM(S): at 09:36

## 2018-12-19 RX ADMIN — HEPARIN SODIUM 5000 UNIT(S): 5000 INJECTION INTRAVENOUS; SUBCUTANEOUS at 05:06

## 2018-12-19 RX ADMIN — Medication 10 MILLIGRAM(S): at 17:07

## 2018-12-19 NOTE — PROGRESS NOTE ADULT - ASSESSMENT
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 A ct chest to r/o dissection was performed, it showed splenomegaly, portal htn, esophageal varices, and ascites. Troponin /EKG negative. Labs showed pancytopenia.    # Primary Biliary Cirrhosis  # Pancytopenia / Portal HTN / Esophageal Varices s/p band / Splenomegaly / Moderate Ascites secondary PBC.   # Ct showing cecitis    - Diagnostic Paracentesis to r/o SBP. IR consulted.   ------->Awaiting AM PLT count, IR to re evaluate for paracentesis  - Lasix 40 and Aldactone 100. Hold for SBP<90.   ------->BP was low this AM, pt baseline BP in 90's, given lasix aldactone yesterday expected to drop. Hold meds and recheck BP patient asymptomatic this AM.  - Propranolol hold SBP<90 HR< 55, for ppx Esophageal varices present and would likely needed banding.  - Patient has INR > 1.5. Monitor   - Continue with Ursodiol for now, avoid Hepatoxic agents  - Dr. Estrada Ortiz hepatologist follows patient outside. Pt supposed to follow for transplant in [facility]  - Her labs in 12/2016 were negative for viral etiology  - Baseline platelet count around 60 k last year, no signs of bleeding    # Pulmonary HTN likely 2/2 portopulmonary congestion  - 2d Echo pending  - Needs outpatient PFTs per Pulm    # GI PPx - (X)Protonix () Not indicated  # DVT PPx - (X) Heparin 5000 mg SubQ    # Activity -  (X) Increase as Tolerated   # Dispo -   Patient to be discharged when medically optimized.  # Code Status - (X) FULL    () DNR / DNI    (X) Discussed Case and Plan with the Medical Attending.    Please call Dr. Wesley with any questions/ recommendations: Spectra #[phone]

## 2018-12-19 NOTE — PROGRESS NOTE ADULT - SUBJECTIVE AND OBJECTIVE BOX
DAILY PROGRESS NOTE  ===========================================================  Patient Information:   Hospital Day:</TOO EPSTEIN  /  58y  /  Female  /b> 2d /  MRN#: [id number]  Working / Admitting Diagnosis:  1. Abdominal pain    |:::::::::::::::::::::::::::::| SUBJECTIVE |:::::::::::::::::::::::::::::::|  OVERNIGHT EVENTS:   Here for abdominal pain chest pain, ACS ruled out. Known cirrhosis 2/2 PBC  s/p 1plt transfused.   BP low this AM, remains asymptomatic. Pt runs low baseline and given lasix / aldactone expected BP to drop.   Denies chest pain / SOB / palpitations / Nausea / Vomiting / constipation / diarrhea or abdominal pain.   ROS otherwise negative.    Past Medical History:   Liver cirrhosis 2/2 PBC  HTN (hypertension)  History of appendectomy  History of tonsillectomy    ALLERGIES:  No Known Allergies    HOME MEDICATIONS:  propranolol 20 mg oral tablet: 1 tab(s) orally 2 times a day (15 Feb 2018 10:53)  ursodiol 300 mg oral tablet: 1 tab(s) orally 3 times a day (15 Feb 2018 10:53)      |:::::::::::::::::::::::::::| OBJECTIVE |:::::::::::::::::::::::::::|    VITAL SIGNS: Last 24 Hours  T(F): 96.6 (19 Dec 2018 04:46), Max: 98.4 (18 Dec 2018 13:20)  HR: 63 (19 Dec 2018 07:50) (61 - 86)  BP: 76/40 (19 Dec 2018 07:50) (76/40 - 101/51)  RR: 18 (18 Dec 2018 20:49) (16 - 18)  SpO2: 92% (18 Dec 2018 08:52) (92% - 92%)    PHYSICAL EXAM:  GENERAL:   Awake, alert; NAD.  HEENT:  Head NC/AT;   NECK:   Supple.  CARDIO:   RRR; S1 & S2 audible  RESP:  No respiratory distress or accessory muscle use. CTAB  GI:   Soft mildly disnteded/ Non tender / No guarding; No rebound tenderness.  EXT:   Without any cyanosis, clubbing, rash, lesions or 1+ pitting edema     LAB RESULTS:                        8.3    1.56  )-----------( 25       ( 18 Dec 2018 08:37 )             27.7     12-18    138  |  106  |  11  ----------------------------<  80  4.1   |  18  |  0.7    Ca    7.4<L>      18 Dec 2018 08:37  Phos  2.4     12-17  Mg     1.8     12-18    TPro  7.6  /  Alb  2.4<L>  /  TBili  1.2  /  DBili  0.4<H>  /  AST  37  /  ALT  14  /  AlkPhos  78  12-18    PT/INR - ( 18 Dec 2018 08:37 )   PT: 18.10 sec;   INR: 1.58 ratio    PTT - ( 18 Dec 2018 08:37 )  PTT:40.7 sec    CARDIAC MARKERS ( 16 Dec 2018 16:55 )  x     / <0.01 ng/mL / x     / x     / x          MICROBIOLOGY:    Culture - Blood (collected 17 Dec 2018 06:29)  Source: .Blood None  Preliminary Report (18 Dec 2018 13:02):    No growth to date.    RADIOLOGY:  < from: CT Abdomen and Pelvis w/ IV Cont (02.15.18 @ 03:37) >  1. Findings suggestive of cecitis. Differential includes infectious and   inflammatory etiologies.  2. Cholelithiasis and dilated gallbladder, correlate clinically for signs   of cholecystitis.  3. Splenomegaly and esophageal varices are suggestive of sequelae of   portal hypertension.      < from: US Abdomen Limited (12.17.18 @ 12:25) >  IMPRESSION:    Hepatic cirrhosis with increased ascites.    Borderline dilated common bile duct, stable.    Cholelithiasis.    < end of copied text >    INPATIENT MEDICATIONS:  furosemide    Tablet 40 milliGRAM(s) Oral daily  heparin  Injectable 5000 Unit(s) SubCutaneous every 8 hours  pantoprazole    Tablet 40 milliGRAM(s) Oral before breakfast  spironolactone 100 milliGRAM(s) Oral daily  ursodiol Capsule 300 milliGRAM(s) Oral every 8 hours    PRN MEDICATIONS  senna 2 Tablet(s) Oral at bedtime PRN    ------------------------------------------------------------------------------------------------------------

## 2018-12-19 NOTE — MEDICAL STUDENT PROGRESS NOTE(EDUCATION) - NS MD HP STUD ASPLAN ASSES FT
58 year old female with a PMH of Cirrhosis secondary to primary biliary cirrhosis, thrombocytopenia, secondary to splenomegaly and cecitis, currently awaiting referral from her hepatologist for a liver transplant, presents with a LUQ pain.

## 2018-12-19 NOTE — MEDICAL STUDENT PROGRESS NOTE(EDUCATION) - SUBJECTIVE AND OBJECTIVE BOX
· Subjective and Objective: 	  Hospital course  58 year old female with a PMH of Cirrhosis secondary to primary biliary cirrhosis, thrombocytopenia, secondary to splenomegaly and cecitis, currently awaiting referral from her hepatologist for a liver transplant, presents with a LUQ pain. A chest CT was done showing splenomegaly, portal hypertension, esophageal varices, and ascitics       24 hour events  Pt is seen today, lying in bed. She denies headaches, visual problems, chest pain, SOB. Her BP this morning was 76/40 but she had no symptoms. Her furosemide, and spironolactone were held due to this hypotension. Radiology saw her and stated that she is a high risk for paracentesis. They recommend replacing platelets and reevaluating her today, along with discussing her with the medicine team. Pulmonology saw her and recommended a 2D echo for her pulmonary hypertension.     Past Medical History  Liver cirrhosis   Thrombocytopenia      Past Surgical History  Appendectomy  Tonsillectomy     Allergies   No known drug allergies       Home Medications  propranolol 20 mg oral tablet: 1 tab(s) orally 2 times a day   ursodiol 300 mg oral tablet: 1 tab(s) orally 3 times a day      INPATIENT MEDICATIONS  furosemide    Tablet 40 milliGRAM(s) Oral daily  heparin  Injectable 5000 Unit(s) SubCutaneous every 8 hours  pantoprazole    Tablet 40 milliGRAM(s) Oral before breakfast  spironolactone 100 milliGRAM(s) Oral daily  ursodiol Capsule 300 milliGRAM(s) Oral every 8 hours    senna 2 Tablet(s) Oral at bedtime PRN        Vitals    T(F): 96.6  HR: 68  BP: 76/40  RR: 18    Physical Exam   General: NAD, awake and alert  Heart: RRR   Lungs: faint crackles in left lower lung, no use of acessory muscle  Abdomen: Soft, slightly to moderate distention, without guarding or rigidity  Extremities:  without cyanosis, without edema    Labs                             8.3    1.56  )-----------( 25                  27.7       138  |  106  |  11  ----------------------------<  80  4.1   |  18  |  0.7    Ca    7.4        Phos  2.4       Mg     1.8        TPro  7.6  /  Alb  2.4<L>  /  TBili  1.2  /  DBili  0.4<H>  /  AST  37  /  ALT  14  /  AlkPhos  78      PT: 18.10 sec;   INR: 1.58 ratio    PTT: 40.7 sec

## 2018-12-19 NOTE — MEDICAL STUDENT PROGRESS NOTE(EDUCATION) - NS MD HP STUD ASPLAN PLAN FT
--Primary Biliary Cirrhosis   -Diagnostic Paracentesis to rule out SBP, IR consulted, IR to revaluate today  -held lasix and aldoctone this morning due to low BP  -Continue Ursodiol  -1 Platelet transfused  - Follow up with Dr. Estrada Ortiz hepatologist as outpatient  -monitor PT, PTT, INR, platelets      --Pulminary HTN  -2d echo  -Pulmonary function testing as outpatient is needed      --Thrombocytopenia   - platelet transfused  -monitor platelet count    --GI PPx   - Protonix     --DVT PPx   -Heparin 5000 mg SubQ

## 2018-12-19 NOTE — PROVIDER CONTACT NOTE (OTHER) - SITUATION
dr bales and dr leo stated no interventions needed, only hold HTN meds and diuretics. pt asymptomatic, alert

## 2018-12-19 NOTE — PROGRESS NOTE ADULT - ATTENDING COMMENTS
patient seen and examined independently on morning rounds, chart reviewed and discussed with the medicine resident  and agree with the above resident progress note with the following addendum:    #PBC and cirrhosis  -patient with borderline low bp this morning- denies any pain, headache or dizziness.  spoke with son on phone who aided with translation.  IR not performing paracentesis 2/2 thrombocytopenia (plt 28k) and inr 1.57  -hold hep sq 2/2 low platelets----s/p 1 unit platelet transfusion eysterday  -will half dose lasix to 20 mg (give pm dose if sbp>90)  -patient has f/u appt for liver transplant team already set up for jan 2, 2019  -f/u results of echo *(to evaluate PASP)  -appreciate pulm recomm--will need outpatient pft---smoking cessation  -GI following  -will consider paracentesis in am if plt/inr and bp better---otherwise f/u as outpatient  -anticipate possible dc home in next 24 hrs if no plan for IR paracentesis during admission    #hypothyroidism  -tsh elevated and low t4 3.3---will start low dose synthroid (50- mcg dailY)- as per son she stopped taking thyroid suppl few months ago 2/2 feeling weak and dizzy which she attributed to medication.  -repeat tft as outaptietn in 2-3 weeks and may need dose adjustment of synthroid.    FULL CODE  guarded prognosis

## 2018-12-20 LAB
ALBUMIN SERPL ELPH-MCNC: 2.5 G/DL — LOW (ref 3.5–5.2)
ALP SERPL-CCNC: 69 U/L — SIGNIFICANT CHANGE UP (ref 30–115)
ALT FLD-CCNC: 11 U/L — SIGNIFICANT CHANGE UP (ref 0–41)
ANION GAP SERPL CALC-SCNC: 12 MMOL/L — SIGNIFICANT CHANGE UP (ref 7–14)
AST SERPL-CCNC: 31 U/L — SIGNIFICANT CHANGE UP (ref 0–41)
BASOPHILS # BLD AUTO: 0.01 K/UL — SIGNIFICANT CHANGE UP (ref 0–0.2)
BASOPHILS NFR BLD AUTO: 0.4 % — SIGNIFICANT CHANGE UP (ref 0–1)
BILIRUB DIRECT SERPL-MCNC: 0.3 MG/DL — HIGH (ref 0–0.2)
BILIRUB INDIRECT FLD-MCNC: 0.6 MG/DL — SIGNIFICANT CHANGE UP (ref 0.2–1.2)
BILIRUB SERPL-MCNC: 0.9 MG/DL — SIGNIFICANT CHANGE UP (ref 0.2–1.2)
BUN SERPL-MCNC: 14 MG/DL — SIGNIFICANT CHANGE UP (ref 10–20)
CALCIUM SERPL-MCNC: 7.7 MG/DL — LOW (ref 8.5–10.1)
CHLORIDE SERPL-SCNC: 105 MMOL/L — SIGNIFICANT CHANGE UP (ref 98–110)
CK MB CFR SERPL CALC: 1.3 NG/ML — SIGNIFICANT CHANGE UP (ref 0.6–6.3)
CO2 SERPL-SCNC: 20 MMOL/L — SIGNIFICANT CHANGE UP (ref 17–32)
CREAT SERPL-MCNC: 0.7 MG/DL — SIGNIFICANT CHANGE UP (ref 0.7–1.5)
EOSINOPHIL # BLD AUTO: 0.03 K/UL — SIGNIFICANT CHANGE UP (ref 0–0.7)
EOSINOPHIL NFR BLD AUTO: 1.3 % — SIGNIFICANT CHANGE UP (ref 0–8)
GLUCOSE SERPL-MCNC: 82 MG/DL — SIGNIFICANT CHANGE UP (ref 70–99)
HCT VFR BLD CALC: 25.9 % — LOW (ref 37–47)
HGB BLD-MCNC: 7.7 G/DL — LOW (ref 12–16)
IMM GRANULOCYTES NFR BLD AUTO: 0.4 % — HIGH (ref 0.1–0.3)
LYMPHOCYTES # BLD AUTO: 0.75 K/UL — LOW (ref 1.2–3.4)
LYMPHOCYTES # BLD AUTO: 33 % — SIGNIFICANT CHANGE UP (ref 20.5–51.1)
MCHC RBC-ENTMCNC: 26.1 PG — LOW (ref 27–31)
MCHC RBC-ENTMCNC: 29.7 G/DL — LOW (ref 32–37)
MCV RBC AUTO: 87.8 FL — SIGNIFICANT CHANGE UP (ref 81–99)
MONOCYTES # BLD AUTO: 0.32 K/UL — SIGNIFICANT CHANGE UP (ref 0.1–0.6)
MONOCYTES NFR BLD AUTO: 14.1 % — HIGH (ref 1.7–9.3)
NEUTROPHILS # BLD AUTO: 1.15 K/UL — LOW (ref 1.4–6.5)
NEUTROPHILS NFR BLD AUTO: 50.8 % — SIGNIFICANT CHANGE UP (ref 42.2–75.2)
NRBC # BLD: 0 /100 WBCS — SIGNIFICANT CHANGE UP (ref 0–0)
PLATELET # BLD AUTO: 27 K/UL — LOW (ref 130–400)
POTASSIUM SERPL-MCNC: 4 MMOL/L — SIGNIFICANT CHANGE UP (ref 3.5–5)
POTASSIUM SERPL-SCNC: 4 MMOL/L — SIGNIFICANT CHANGE UP (ref 3.5–5)
PROT SERPL-MCNC: 7.3 G/DL — SIGNIFICANT CHANGE UP (ref 6–8)
RBC # BLD: 2.95 M/UL — LOW (ref 4.2–5.4)
RBC # FLD: 19.4 % — HIGH (ref 11.5–14.5)
SODIUM SERPL-SCNC: 137 MMOL/L — SIGNIFICANT CHANGE UP (ref 135–146)
TROPONIN T SERPL-MCNC: <0.01 NG/ML — SIGNIFICANT CHANGE UP
WBC # BLD: 2.27 K/UL — LOW (ref 4.8–10.8)
WBC # FLD AUTO: 2.27 K/UL — LOW (ref 4.8–10.8)

## 2018-12-20 RX ORDER — MIDODRINE HYDROCHLORIDE 2.5 MG/1
5 TABLET ORAL EVERY 8 HOURS
Qty: 0 | Refills: 0 | Status: DISCONTINUED | OUTPATIENT
Start: 2018-12-20 | End: 2018-12-21

## 2018-12-20 RX ORDER — SPIRONOLACTONE 25 MG/1
50 TABLET, FILM COATED ORAL DAILY
Qty: 0 | Refills: 0 | Status: DISCONTINUED | OUTPATIENT
Start: 2018-12-20 | End: 2018-12-21

## 2018-12-20 RX ADMIN — MIDODRINE HYDROCHLORIDE 5 MILLIGRAM(S): 2.5 TABLET ORAL at 05:13

## 2018-12-20 RX ADMIN — Medication 50 MICROGRAM(S): at 05:13

## 2018-12-20 RX ADMIN — SPIRONOLACTONE 50 MILLIGRAM(S): 25 TABLET, FILM COATED ORAL at 12:05

## 2018-12-20 RX ADMIN — URSODIOL 300 MILLIGRAM(S): 250 TABLET, FILM COATED ORAL at 05:12

## 2018-12-20 RX ADMIN — MIDODRINE HYDROCHLORIDE 5 MILLIGRAM(S): 2.5 TABLET ORAL at 13:34

## 2018-12-20 RX ADMIN — URSODIOL 300 MILLIGRAM(S): 250 TABLET, FILM COATED ORAL at 13:34

## 2018-12-20 RX ADMIN — Medication 10 MILLIGRAM(S): at 18:14

## 2018-12-20 RX ADMIN — URSODIOL 300 MILLIGRAM(S): 250 TABLET, FILM COATED ORAL at 21:16

## 2018-12-20 RX ADMIN — PANTOPRAZOLE SODIUM 40 MILLIGRAM(S): 20 TABLET, DELAYED RELEASE ORAL at 05:13

## 2018-12-20 RX ADMIN — MIDODRINE HYDROCHLORIDE 5 MILLIGRAM(S): 2.5 TABLET ORAL at 21:16

## 2018-12-20 NOTE — PROGRESS NOTE ADULT - ASSESSMENT
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 A ct chest to r/o dissection was performed, it showed splenomegaly, portal htn, esophageal varices, and ascites. Troponin /EKG negative. Labs showed pancytopenia.    # Primary Biliary Cirrhosis  # Pancytopenia / Portal HTN / Esophageal Varices s/p band / Splenomegaly / Moderate Ascites secondary PBC.   # Ct showing cecitis    - Will hold off on Paracentesis given High risk  - Given LOW BP Lasix 20 and Aldactone 50 from 100. Hold for SBP<90. Add Midodrine For BP. Monitor today for BP and possible DC in AM  - Propranolol hold SBP<90 HR< 55, for ppx Esophageal varices present and would likely needed banding.  - Patient has INR > 1.5. Monitor   - Continue with Ursodiol for now, avoid Hepatoxic agents  - Dr. Estrada Ortiz hepatologist follows patient outside. Pt supposed to follow for transplant in [facility]  - Her labs in 12/2016 were negative for viral etiology  - Baseline platelet count around 60 k last year, no signs of bleeding    # Pulmonary HTN likely 2/2 portopulmonary congestion - Needs outpatient PFTs per Pulm and dieresis   # GI PPx - (X)Protonix () Not indicated  # DVT PPx - (X) Heparin 5000 mg SubQ    # Activity -  (X) Increase as Tolerated   # Dispo -   Patient to be discharged when medically optimized.  # Code Status - (X) FULL    () DNR / DNI    (X) Discussed Case and Plan with the Medical Attending.    Please call Dr. Wesley with any questions/ recommendations: Spectra #[phone]

## 2018-12-20 NOTE — PROGRESS NOTE ADULT - SUBJECTIVE AND OBJECTIVE BOX
DAILY PROGRESS NOTE  ===========================================================  Patient Information:   Hospital Day:</TOO EPSTEIN  /  58y  /  Female  /b> 2d /  MRN#: [id number]  Working / Admitting Diagnosis:  1. Abdominal pain    |:::::::::::::::::::::::::::::| SUBJECTIVE |:::::::::::::::::::::::::::::::|  OVERNIGHT EVENTS:   Here for abdominal pain chest pain, ACS ruled out. Known cirrhosis 2/2 PBC  Acute events reviewed, BP dipped as low 75 SBP. EKR repeated CE negative.   Pt runs low baseline and given dieresis expected.  Started on Midodrine  Denies chest pain / SOB / palpitations / Nausea / Vomiting / constipation / diarrhea or abdominal pain.   ROS otherwise negative.    Past Medical History:   Liver cirrhosis 2/2 PBC  HTN (hypertension)  History of appendectomy  History of tonsillectomy    ALLERGIES:  No Known Allergies    HOME MEDICATIONS:  propranolol 20 mg oral tablet: 1 tab(s) orally 2 times a day (15 Feb 2018 10:53)  ursodiol 300 mg oral tablet: 1 tab(s) orally 3 times a day (15 Feb 2018 10:53)      |:::::::::::::::::::::::::::| OBJECTIVE |:::::::::::::::::::::::::::|    VITAL SIGNS: Last 24 Hours  T(F): 99.1 (20 Dec 2018 05:14), Max: 99.1 (20 Dec 2018 05:14)  HR: 69 (20 Dec 2018 05:14) (55 - 73)  BP: 87/53 (20 Dec 2018 05:14) (77/44 - 100/60)  RR: 18 (20 Dec 2018 05:14) (18 - 18)    PHYSICAL EXAM:  GENERAL:   Awake, alert; NAD.  HEENT:  Head NC/AT;   NECK:   Supple.  CARDIO:   RRR; S1 & S2 audible  RESP:  No respiratory distress or accessory muscle use. CTAB  GI:   Soft mildly disnteded/ Non tender / No guarding; No rebound tenderness.  EXT:   Without any cyanosis, clubbing, rash, lesions or 1+ pitting edema     LAB RESULTS:                         7.7    2.27  )-----------( 27       ( 20 Dec 2018 07:34 )             25.9   12-20    137  |  105  |  14  ----------------------------<  82  4.0   |  20  |  0.7    Ca    7.7<L>      20 Dec 2018 07:34  Phos  3.0     12-19  Mg     1.8     12-19    TPro  7.3  /  Alb  2.5<L>  /  TBili  0.9  /  DBili  0.3<H>  /  AST  31  /  ALT  11  /  AlkPhos  69  12-20    PT/INR - ( 18 Dec 2018 08:37 )   PT: 18.10 sec;   INR: 1.58 ratio    PTT - ( 18 Dec 2018 08:37 )  PTT:40.7 sec    Troponin T, Serum (12.20.18 @ 01:50)    Troponin T, Serum: <0.01 ng/mL    MICROBIOLOGY:    Culture - Blood (collected 17 Dec 2018 06:29)  Source: .Blood None  Preliminary Report (18 Dec 2018 13:02):    No growth to date.    RADIOLOGY:  < from: CT Abdomen and Pelvis w/ IV Cont (02.15.18 @ 03:37) >  1. Findings suggestive of cecitis. Differential includes infectious and   inflammatory etiologies.  2. Cholelithiasis and dilated gallbladder, correlate clinically for signs   of cholecystitis.  3. Splenomegaly and esophageal varices are suggestive of sequelae of   portal hypertension.      < from: US Abdomen Limited (12.17.18 @ 12:25) >  IMPRESSION:    Hepatic cirrhosis with increased ascites.    Borderline dilated common bile duct, stable.    Cholelithiasis.    < end of copied text >    INPATIENT MEDICATIONS:  MEDICATIONS  (STANDING):  furosemide    Tablet 20 milliGRAM(s) Oral daily  levothyroxine 50 MICROGram(s) Oral daily  midodrine 5 milliGRAM(s) Oral every 8 hours  pantoprazole    Tablet 40 milliGRAM(s) Oral before breakfast  propranolol 10 milliGRAM(s) Oral two times a day  spironolactone 50 milliGRAM(s) Oral daily  ursodiol Capsule 300 milliGRAM(s) Oral every 8 hours    MEDICATIONS  (PRN):  senna 2 Tablet(s) Oral at bedtime PRN Constipation  ------------------------------------------------------------------------------------------------------------

## 2018-12-20 NOTE — PROGRESS NOTE ADULT - ATTENDING COMMENTS
patient seen and examined independently on morning rounds, chart reviewed and discussed with the medicine resident  and agree with the above resident progress note with the following addendum:    overnight events noted including hypotension--today bp back up to baseline (systolic 90's)     #PBC and cirrhosis  -No plan for current paracentesis 2/2 persistant thrombocytopenia and elevated inr  -plt today 27k  -will need f/u with outpatietn GI/hepatology- and liver transplant referral  - denies any pain, headache or dizziness  -hold hep sq 2/2 low platelets----s/p 1 unit platelet transfusion 12/17  -if bp tolerates restart half dose lasix (20 mg daily) and half dose aldactone 50 mg daily (can titrate to 25 mg bid if persistantly hypotensive)  -patient has f/u appt for liver transplant team already set up for jan 2, 2019  -echo with severe pulmonary hypertension (pasp 89)  -appreciate pulm recomm--will need outpatient pft---smoking cessation  -GI following  -will need paracentesis (therapeutic) once plt/inr and bp better---otherwise f/u as outpatient and anticipate discharge home in am    #hypothyroidism  -tsh elevated and low t4 3.3---will start low dose synthroid (50- mcg dailY)- as per son she stopped taking thyroid suppl few months ago 2/2 feeling weak and dizzy which she attributed to medication.  -repeat tft as outaptietn in 2-3 weeks and may need dose adjustment of synthroid.    DVT/GI ppx    FULL CODE  guarded prognosis

## 2018-12-21 ENCOUNTER — TRANSCRIPTION ENCOUNTER (OUTPATIENT)
Age: 58
End: 2018-12-21

## 2018-12-21 VITALS — HEART RATE: 57 BPM | SYSTOLIC BLOOD PRESSURE: 107 MMHG | DIASTOLIC BLOOD PRESSURE: 54 MMHG

## 2018-12-21 LAB
ANION GAP SERPL CALC-SCNC: 14 MMOL/L — SIGNIFICANT CHANGE UP (ref 7–14)
BUN SERPL-MCNC: 11 MG/DL — SIGNIFICANT CHANGE UP (ref 10–20)
CALCIUM SERPL-MCNC: 7.7 MG/DL — LOW (ref 8.5–10.1)
CHLORIDE SERPL-SCNC: 103 MMOL/L — SIGNIFICANT CHANGE UP (ref 98–110)
CO2 SERPL-SCNC: 19 MMOL/L — SIGNIFICANT CHANGE UP (ref 17–32)
CREAT SERPL-MCNC: 0.7 MG/DL — SIGNIFICANT CHANGE UP (ref 0.7–1.5)
GLUCOSE SERPL-MCNC: 78 MG/DL — SIGNIFICANT CHANGE UP (ref 70–99)
HCT VFR BLD CALC: 27 % — LOW (ref 37–47)
HGB BLD-MCNC: 8.1 G/DL — LOW (ref 12–16)
MAGNESIUM SERPL-MCNC: 1.9 MG/DL — SIGNIFICANT CHANGE UP (ref 1.8–2.4)
MCHC RBC-ENTMCNC: 26.6 PG — LOW (ref 27–31)
MCHC RBC-ENTMCNC: 30 G/DL — LOW (ref 32–37)
MCV RBC AUTO: 88.8 FL — SIGNIFICANT CHANGE UP (ref 81–99)
NRBC # BLD: 0 /100 WBCS — SIGNIFICANT CHANGE UP (ref 0–0)
PHOSPHATE SERPL-MCNC: 2.8 MG/DL — SIGNIFICANT CHANGE UP (ref 2.1–4.9)
PLATELET # BLD AUTO: 32 K/UL — LOW (ref 130–400)
POTASSIUM SERPL-MCNC: 3.9 MMOL/L — SIGNIFICANT CHANGE UP (ref 3.5–5)
POTASSIUM SERPL-SCNC: 3.9 MMOL/L — SIGNIFICANT CHANGE UP (ref 3.5–5)
RBC # BLD: 3.04 M/UL — LOW (ref 4.2–5.4)
RBC # FLD: 19.6 % — HIGH (ref 11.5–14.5)
SODIUM SERPL-SCNC: 136 MMOL/L — SIGNIFICANT CHANGE UP (ref 135–146)
WBC # BLD: 2.04 K/UL — LOW (ref 4.8–10.8)
WBC # FLD AUTO: 2.04 K/UL — LOW (ref 4.8–10.8)

## 2018-12-21 RX ORDER — FUROSEMIDE 40 MG
1 TABLET ORAL
Qty: 30 | Refills: 0 | OUTPATIENT
Start: 2018-12-21

## 2018-12-21 RX ORDER — PANTOPRAZOLE SODIUM 20 MG/1
1 TABLET, DELAYED RELEASE ORAL
Qty: 30 | Refills: 0 | OUTPATIENT
Start: 2018-12-21

## 2018-12-21 RX ORDER — LEVOTHYROXINE SODIUM 125 MCG
1 TABLET ORAL
Qty: 30 | Refills: 0 | OUTPATIENT
Start: 2018-12-21 | End: 2019-01-19

## 2018-12-21 RX ORDER — MIDODRINE HYDROCHLORIDE 2.5 MG/1
1 TABLET ORAL
Qty: 90 | Refills: 0 | OUTPATIENT
Start: 2018-12-21 | End: 2019-01-19

## 2018-12-21 RX ORDER — FUROSEMIDE 40 MG
20 TABLET ORAL EVERY 24 HOURS
Qty: 0 | Refills: 0 | Status: DISCONTINUED | OUTPATIENT
Start: 2018-12-21 | End: 2018-12-21

## 2018-12-21 RX ORDER — PROPRANOLOL HCL 160 MG
1 CAPSULE, EXTENDED RELEASE 24HR ORAL
Qty: 0 | Refills: 0 | COMMUNITY

## 2018-12-21 RX ORDER — MIDODRINE HYDROCHLORIDE 2.5 MG/1
10 TABLET ORAL THREE TIMES A DAY
Qty: 0 | Refills: 0 | Status: DISCONTINUED | OUTPATIENT
Start: 2018-12-21 | End: 2018-12-21

## 2018-12-21 RX ORDER — PROPRANOLOL HCL 160 MG
10 CAPSULE, EXTENDED RELEASE 24HR ORAL EVERY 24 HOURS
Qty: 0 | Refills: 0 | Status: DISCONTINUED | OUTPATIENT
Start: 2018-12-21 | End: 2018-12-21

## 2018-12-21 RX ADMIN — PANTOPRAZOLE SODIUM 40 MILLIGRAM(S): 20 TABLET, DELAYED RELEASE ORAL at 07:54

## 2018-12-21 RX ADMIN — URSODIOL 300 MILLIGRAM(S): 250 TABLET, FILM COATED ORAL at 05:59

## 2018-12-21 RX ADMIN — URSODIOL 300 MILLIGRAM(S): 250 TABLET, FILM COATED ORAL at 14:28

## 2018-12-21 RX ADMIN — Medication 50 MICROGRAM(S): at 05:59

## 2018-12-21 RX ADMIN — MIDODRINE HYDROCHLORIDE 10 MILLIGRAM(S): 2.5 TABLET ORAL at 11:18

## 2018-12-21 RX ADMIN — Medication 20 MILLIGRAM(S): at 11:18

## 2018-12-21 RX ADMIN — MIDODRINE HYDROCHLORIDE 5 MILLIGRAM(S): 2.5 TABLET ORAL at 05:58

## 2018-12-21 NOTE — PROGRESS NOTE ADULT - SUBJECTIVE AND OBJECTIVE BOX
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 She has had chronic abdominal pain, located on RUQ and LUQ and explains that it was it's primarily due to cirrhosis and splenomegaly. This morning the pain in the LUQ worsened from her baseline to almost 10/10 in intensity, sharp in character and radiating in a band like fashion to the back.. She denies any fever, chills, nausea, vomiting and diarrhea. There are no recent sick contacts or abnormal diet patterns are present. In ED, patient had CT scan which showed liver cirrhosis with portal hypertension and splenomegaly. She also had moderate Abdomeno-pelvic Ascites and oesophageal varices. However, she was found to have worsening thrombocytopenia. She was supposed to  visit to her hepatologist the coming week and the sons are trying to arrange for her liver transplant at [facility]. She was diagnosed with PBC in 2003. Most recent Endoscopy was within last 1 year. She denies any history of Upper GI bleed or Lower GI bleed. (16 Dec 2018 22:17)    PAST MEDICAL & SURGICAL HISTORY:  Liver cirrhosis  History of appendectomy  History of tonsillectomy    no overnight events--- No paracentesis done 2/2 low platelets and elevated inr (plt up to 32k)--patient appears comfortable and plan for discharge home today    Vital Signs Last 24 Hrs  T(C): 36.1 (21 Dec 2018 14:38), Max: 36.9 (20 Dec 2018 21:22)  T(F): 96.9 (21 Dec 2018 14:38), Max: 98.4 (20 Dec 2018 21:22)  HR: 57 (21 Dec 2018 15:32) (54 - 65)  BP: 107/54 (21 Dec 2018 15:32) (80/53 - 107/54)  BP(mean): --  RR: 18 (21 Dec 2018 14:38) (18 - 18)  SpO2: 97% (21 Dec 2018 11:50) (97% - 97%)             PE:  GEN-NAD, AAOx3  HEENT- NCAT, PERRLA, EOMI  PULM- Clear to auscultation bilaterally, fair air entry  CVS- +s1/s2 RRR no murmurs  GI- soft distended, nt +bs ND   EXT- no edema               8.1    2.04  )-----------( 32       ( 21 Dec 2018 06:06 )             27.0     12-21    136  |  103  |  11  ----------------------------<  78  3.9   |  19  |  0.7    Ca    7.7<L>      21 Dec 2018 06:06  Phos  2.8     12-21  Mg     1.9     12-21    TPro  7.3  /  Alb  2.5<L>  /  TBili  0.9  /  DBili  0.3<H>  /  AST  31  /  ALT  11  /  AlkPhos  69  12-20    CARDIAC MARKERS ( 20 Dec 2018 01:50 )  x     / <0.01 ng/mL / x     / x     / 1.3 ng/mL            MEDICATIONS  (STANDING):  furosemide    Tablet 20 milliGRAM(s) Oral every 24 hours  levothyroxine 50 MICROGram(s) Oral daily  midodrine 10 milliGRAM(s) Oral three times a day  pantoprazole    Tablet 40 milliGRAM(s) Oral before breakfast  propranolol 10 milliGRAM(s) Oral every 24 hours  ursodiol Capsule 300 milliGRAM(s) Oral every 8 hours

## 2018-12-21 NOTE — DISCHARGE NOTE ADULT - MEDICATION SUMMARY - MEDICATIONS TO TAKE
I will START or STAY ON the medications listed below when I get home from the hospital:    propranolol 20 mg oral tablet  -- 1 tab(s) by mouth once a day at 10AM  -- Indication: For esophogeal varices    furosemide 20 mg oral tablet  -- 1 tab(s) by mouth once a day at 12PM  -- Indication: For Cirhosis    ursodiol 300 mg oral tablet  -- 1 tab(s) by mouth 3 times a day  -- Indication: For Cirhosis    midodrine 10 mg oral tablet  -- 1 tab(s) by mouth 3 times a day  -- Indication: For Hypotension    pantoprazole 40 mg oral delayed release tablet  -- 1 tab(s) by mouth once a day (before a meal)  -- Indication: For GERD    levothyroxine 50 mcg (0.05 mg) oral tablet  -- 1 tab(s) by mouth once a day  -- Indication: For Hypothyroidism

## 2018-12-21 NOTE — DISCHARGE NOTE ADULT - CARE PROVIDER_API CALL
Estrada Ortiz (MD), Internal Medicine  [address] [place] [address]  [address] [place] [address]  Phone: [phone]  Fax: [phone]

## 2018-12-21 NOTE — PROVIDER CONTACT NOTE (OTHER) - ACTION/TREATMENT ORDERED:
as per md repeat in 1 hour, hold d/c to recheck vitals at that time. will inform md of repeat reading.
as per MD will order something for abdominal pain.

## 2018-12-21 NOTE — DISCHARGE NOTE ADULT - PATIENT PORTAL LINK FT
You can access the [facility] Patient Portal, offered by [facility], by registering with the following website: [email or web address]

## 2018-12-21 NOTE — PROVIDER CONTACT NOTE (OTHER) - SITUATION
No  new Order Given , Patient getting Midodrine 5 mg po q 8hrs , Sleeping Comfort without distress , ALERT/Oriented x 4 .No Acute distress noted , Will  continue to assess and Monitor.

## 2018-12-21 NOTE — PROGRESS NOTE ADULT - ASSESSMENT
patient seen and examined independently on morning rounds, chart reviewed and discussed with medicine resident     time spendt on discharge >30 minutes including coordination of discharge care    discharge home today- plan for f/u with outpatient pcp/GI hepatology (Dr. Ortiz)  son is bedside and explained to him plan including appropriate times/doses of medications and confirmed patient has a home bp cuff to monitor bp--advised to return to ED should her bp remain persistantly low or if develops any new symptoms.  will continue propanolol 10 mg but only once daily (in afternoon)  cont lasix 20 mg daily (advised to take at noon)--will hold on restarting aldactone  increase midodrine to 10 mg     has f/u at [facility] Liver transplant team january 4, 2019    DISCHARGE home today

## 2018-12-21 NOTE — DISCHARGE NOTE ADULT - CARE PLAN
Principal Discharge DX:	Liver cirrhosis  Goal:	Follow appt at [facility] for liver hepatologist workup for PBC  Assessment and plan of treatment:	Continue Dierutics and BP medication strictly as directed and monitored while in hopsital  Recommend a BP machine for home to monitor BP. Come to ER if feel chest pain SOB fatigue dizziness or BP noted systolic BP noted less 90

## 2018-12-21 NOTE — DISCHARGE NOTE ADULT - PLAN OF CARE
Follow appt at [facility] for liver hepatologist workup for PBC Continue Dierutics and BP medication strictly as directed and monitored while in hopsital  Recommend a BP machine for home to monitor BP. Come to ER if feel chest pain SOB fatigue dizziness or BP noted systolic BP noted less 90

## 2018-12-21 NOTE — DISCHARGE NOTE ADULT - MEDICATION SUMMARY - MEDICATIONS TO STOP TAKING
I will STOP taking the medications listed below when I get home from the hospital:    azithromycin 500 mg oral tablet  -- Day 1 take 500mg po; day 2-5  take 250mg   -- Do not take dairy products, antacids, or iron preparations within one hour of this medication.  Finish all this medication unless otherwise directed by prescriber.    levoFLOXacin 500 mg oral tablet  -- 1 tab(s) by mouth once a day   -- Avoid prolonged or excessive exposure to direct and/or artificial sunlight while taking this medication.  Do not take dairy products, antacids, or iron preparations within one hour of this medication.  Finish all this medication unless otherwise directed by prescriber.  May cause drowsiness or dizziness.  Medication should be taken with plenty of water.

## 2018-12-21 NOTE — DISCHARGE NOTE ADULT - HOSPITAL COURSE
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 A ct chest to r/o dissection was performed, it showed splenomegaly, portal htn, esophageal varices, and ascites. Troponin /EKG negative. Labs showed pancytopenia.    For her Primary Biliary Cirrhosis / Pancytopenia / Portal HTN / Esophageal Varices s/p band / Splenomegaly / Moderate Ascites secondary PBC / Ct showing cecitis holding off on Paracentesis given High risk for low PLT despite transfusion.  Given LOW BP (especially in AM) Lasix 20 daily home at noon  no aldactone, Added Midodrine 10 q8 and decreased Propranolol to once a day  for ppx Esophageal varices present and would likely needed banding. Patient has INR > 1.5. She will Continue with Ursodiol for now, avoid Hepatoxic agents  - Dr. Estrada Ortiz hepatologist follows patient outside. Pt supposed to follow for transplant in [facility]  - Her labs in 12/2016 were negative for viral etiology  - Baseline platelet count around 60 k last year, no signs of bleeding  Also hasPulmonary HTN likely 2/2 portopulmonary congestion - Needs outpatient PFTs per Pulm and dieresis

## 2018-12-22 LAB
CULTURE RESULTS: SIGNIFICANT CHANGE UP
SPECIMEN SOURCE: SIGNIFICANT CHANGE UP

## 2018-12-24 ENCOUNTER — INPATIENT (INPATIENT)
Facility: HOSPITAL | Age: 58
LOS: 3 days | Discharge: HOME HEALTH | End: 2018-12-28
Attending: INTERNAL MEDICINE | Admitting: INTERNAL MEDICINE
Payer: MEDICAID

## 2018-12-24 VITALS
SYSTOLIC BLOOD PRESSURE: 93 MMHG | TEMPERATURE: 96 F | HEART RATE: 50 BPM | DIASTOLIC BLOOD PRESSURE: 63 MMHG | WEIGHT: 119.93 LBS | RESPIRATION RATE: 24 BRPM | OXYGEN SATURATION: 96 %

## 2018-12-24 DIAGNOSIS — Z98.890 OTHER SPECIFIED POSTPROCEDURAL STATES: Chronic | ICD-10-CM

## 2018-12-24 PROBLEM — K74.60 UNSPECIFIED CIRRHOSIS OF LIVER: Chronic | Status: ACTIVE | Noted: 2018-12-16

## 2018-12-24 LAB
ALBUMIN SERPL ELPH-MCNC: 2.8 G/DL — LOW (ref 3.5–5.2)
ALP SERPL-CCNC: 81 U/L — SIGNIFICANT CHANGE UP (ref 30–115)
ALT FLD-CCNC: 14 U/L — SIGNIFICANT CHANGE UP (ref 0–41)
AMMONIA BLD-MCNC: 74 UMOL/L — HIGH (ref 11–55)
ANION GAP SERPL CALC-SCNC: 14 MMOL/L — SIGNIFICANT CHANGE UP (ref 7–14)
APTT BLD: 26.9 SEC — LOW (ref 27–39.2)
AST SERPL-CCNC: 42 U/L — HIGH (ref 0–41)
BASOPHILS # BLD AUTO: 0 K/UL — SIGNIFICANT CHANGE UP (ref 0–0.2)
BASOPHILS NFR BLD AUTO: 0 % — SIGNIFICANT CHANGE UP (ref 0–1)
BILIRUB SERPL-MCNC: 1 MG/DL — SIGNIFICANT CHANGE UP (ref 0.2–1.2)
BUN SERPL-MCNC: 12 MG/DL — SIGNIFICANT CHANGE UP (ref 10–20)
CALCIUM SERPL-MCNC: 7.8 MG/DL — LOW (ref 8.5–10.1)
CHLORIDE SERPL-SCNC: 104 MMOL/L — SIGNIFICANT CHANGE UP (ref 98–110)
CO2 SERPL-SCNC: 19 MMOL/L — SIGNIFICANT CHANGE UP (ref 17–32)
CREAT SERPL-MCNC: 0.9 MG/DL — SIGNIFICANT CHANGE UP (ref 0.7–1.5)
EOSINOPHIL # BLD AUTO: 0.03 K/UL — SIGNIFICANT CHANGE UP (ref 0–0.7)
EOSINOPHIL NFR BLD AUTO: 1.4 % — SIGNIFICANT CHANGE UP (ref 0–8)
GAS PNL BLDV: SIGNIFICANT CHANGE UP
GLUCOSE SERPL-MCNC: 93 MG/DL — SIGNIFICANT CHANGE UP (ref 70–99)
HCT VFR BLD CALC: 29.4 % — LOW (ref 37–47)
HGB BLD-MCNC: 8.9 G/DL — LOW (ref 12–16)
IMM GRANULOCYTES NFR BLD AUTO: 0.9 % — HIGH (ref 0.1–0.3)
INR BLD: 1.6 RATIO — HIGH (ref 0.65–1.3)
LIDOCAIN IGE QN: 114 U/L — HIGH (ref 7–60)
LYMPHOCYTES # BLD AUTO: 0.86 K/UL — LOW (ref 1.2–3.4)
LYMPHOCYTES # BLD AUTO: 38.7 % — SIGNIFICANT CHANGE UP (ref 20.5–51.1)
MAGNESIUM SERPL-MCNC: 2 MG/DL — SIGNIFICANT CHANGE UP (ref 1.8–2.4)
MCHC RBC-ENTMCNC: 27.1 PG — SIGNIFICANT CHANGE UP (ref 27–31)
MCHC RBC-ENTMCNC: 30.3 G/DL — LOW (ref 32–37)
MCV RBC AUTO: 89.4 FL — SIGNIFICANT CHANGE UP (ref 81–99)
MONOCYTES # BLD AUTO: 0.17 K/UL — SIGNIFICANT CHANGE UP (ref 0.1–0.6)
MONOCYTES NFR BLD AUTO: 7.7 % — SIGNIFICANT CHANGE UP (ref 1.7–9.3)
NEUTROPHILS # BLD AUTO: 1.14 K/UL — LOW (ref 1.4–6.5)
NEUTROPHILS NFR BLD AUTO: 51.3 % — SIGNIFICANT CHANGE UP (ref 42.2–75.2)
PLATELET # BLD AUTO: 44 K/UL — LOW (ref 130–400)
POTASSIUM SERPL-MCNC: 3.9 MMOL/L — SIGNIFICANT CHANGE UP (ref 3.5–5)
POTASSIUM SERPL-SCNC: 3.9 MMOL/L — SIGNIFICANT CHANGE UP (ref 3.5–5)
PROT SERPL-MCNC: 8.2 G/DL — HIGH (ref 6–8)
PROTHROM AB SERPL-ACNC: 18.3 SEC — HIGH (ref 9.95–12.87)
RBC # BLD: 3.29 M/UL — LOW (ref 4.2–5.4)
RBC # FLD: 19.9 % — HIGH (ref 11.5–14.5)
SODIUM SERPL-SCNC: 137 MMOL/L — SIGNIFICANT CHANGE UP (ref 135–146)
TROPONIN T SERPL-MCNC: <0.01 NG/ML — SIGNIFICANT CHANGE UP
TYPE + AB SCN PNL BLD: SIGNIFICANT CHANGE UP
WBC # BLD: 2.22 K/UL — LOW (ref 4.8–10.8)
WBC # FLD AUTO: 2.22 K/UL — LOW (ref 4.8–10.8)

## 2018-12-24 PROCEDURE — 99222 1ST HOSP IP/OBS MODERATE 55: CPT

## 2018-12-24 RX ORDER — OXYCODONE HYDROCHLORIDE 5 MG/1
10 TABLET ORAL EVERY 12 HOURS
Qty: 0 | Refills: 0 | Status: DISCONTINUED | OUTPATIENT
Start: 2018-12-24 | End: 2018-12-26

## 2018-12-24 RX ORDER — LEVOTHYROXINE SODIUM 125 MCG
50 TABLET ORAL DAILY
Qty: 0 | Refills: 0 | Status: DISCONTINUED | OUTPATIENT
Start: 2018-12-24 | End: 2018-12-26

## 2018-12-24 RX ORDER — URSODIOL 250 MG/1
300 TABLET, FILM COATED ORAL EVERY 8 HOURS
Qty: 0 | Refills: 0 | Status: DISCONTINUED | OUTPATIENT
Start: 2018-12-24 | End: 2018-12-28

## 2018-12-24 RX ORDER — MORPHINE SULFATE 50 MG/1
2 CAPSULE, EXTENDED RELEASE ORAL ONCE
Qty: 0 | Refills: 0 | Status: DISCONTINUED | OUTPATIENT
Start: 2018-12-24 | End: 2018-12-24

## 2018-12-24 RX ORDER — FUROSEMIDE 40 MG
20 TABLET ORAL DAILY
Qty: 0 | Refills: 0 | Status: DISCONTINUED | OUTPATIENT
Start: 2018-12-24 | End: 2018-12-24

## 2018-12-24 RX ORDER — PROPRANOLOL HCL 160 MG
20 CAPSULE, EXTENDED RELEASE 24HR ORAL DAILY
Qty: 0 | Refills: 0 | Status: DISCONTINUED | OUTPATIENT
Start: 2018-12-24 | End: 2018-12-26

## 2018-12-24 RX ORDER — DOCUSATE SODIUM 100 MG
100 CAPSULE ORAL DAILY
Qty: 0 | Refills: 0 | Status: DISCONTINUED | OUTPATIENT
Start: 2018-12-24 | End: 2018-12-26

## 2018-12-24 RX ORDER — FUROSEMIDE 40 MG
20 TABLET ORAL DAILY
Qty: 0 | Refills: 0 | Status: DISCONTINUED | OUTPATIENT
Start: 2018-12-24 | End: 2018-12-28

## 2018-12-24 RX ORDER — PANTOPRAZOLE SODIUM 20 MG/1
40 TABLET, DELAYED RELEASE ORAL
Qty: 0 | Refills: 0 | Status: DISCONTINUED | OUTPATIENT
Start: 2018-12-24 | End: 2018-12-28

## 2018-12-24 RX ORDER — OXYCODONE HYDROCHLORIDE 5 MG/1
5 TABLET ORAL THREE TIMES A DAY
Qty: 0 | Refills: 0 | Status: DISCONTINUED | OUTPATIENT
Start: 2018-12-24 | End: 2018-12-26

## 2018-12-24 RX ORDER — SENNA PLUS 8.6 MG/1
2 TABLET ORAL AT BEDTIME
Qty: 0 | Refills: 0 | Status: DISCONTINUED | OUTPATIENT
Start: 2018-12-24 | End: 2018-12-26

## 2018-12-24 RX ORDER — MIDODRINE HYDROCHLORIDE 2.5 MG/1
10 TABLET ORAL THREE TIMES A DAY
Qty: 0 | Refills: 0 | Status: DISCONTINUED | OUTPATIENT
Start: 2018-12-24 | End: 2018-12-28

## 2018-12-24 RX ORDER — LIDOCAINE 4 G/100G
1 CREAM TOPICAL DAILY
Qty: 0 | Refills: 0 | Status: DISCONTINUED | OUTPATIENT
Start: 2018-12-24 | End: 2018-12-28

## 2018-12-24 RX ADMIN — URSODIOL 300 MILLIGRAM(S): 250 TABLET, FILM COATED ORAL at 22:16

## 2018-12-24 RX ADMIN — OXYCODONE HYDROCHLORIDE 10 MILLIGRAM(S): 5 TABLET ORAL at 17:56

## 2018-12-24 RX ADMIN — LIDOCAINE 1 PATCH: 4 CREAM TOPICAL at 17:57

## 2018-12-24 RX ADMIN — MIDODRINE HYDROCHLORIDE 10 MILLIGRAM(S): 2.5 TABLET ORAL at 14:43

## 2018-12-24 RX ADMIN — URSODIOL 300 MILLIGRAM(S): 250 TABLET, FILM COATED ORAL at 17:56

## 2018-12-24 RX ADMIN — MIDODRINE HYDROCHLORIDE 10 MILLIGRAM(S): 2.5 TABLET ORAL at 21:08

## 2018-12-24 RX ADMIN — MORPHINE SULFATE 2 MILLIGRAM(S): 50 CAPSULE, EXTENDED RELEASE ORAL at 07:39

## 2018-12-24 NOTE — ED PROVIDER NOTE - OBJECTIVE STATEMENT
57 y/o female with pmhx of primary biliary cirrhosis, thrombocytopenia 2/2 splenomegaly, pulmonary HTN, esophageal varices presents s/p syncope/fall. Per son, patient was found in the bathroom laying on the ground, calling out his name, unknown whether patient sustained a mechanical fall or syncopized. Patient was recently discharged from the hospital 2 days ago, initially presented with chest pain however was found to have ascites, hypotension. Patient is currently on propranolol, which was decreased on discharged. Family doesn't know whether patient has had blood bowel movement/hematemesis.

## 2018-12-24 NOTE — ED PROVIDER NOTE - PHYSICAL EXAMINATION
CONSTITUTIONAL: Well-developed; well-nourished; in no acute distress.   SKIN: +pale skin; warm, dry  HEAD: Normocephalic; no skull indentations, no contusions or lacerations  EYES: no gross trauma bilaterally, no proptosis  ENT: No nasal discharge; airway clear. no racoon eyes no cool sign  NECK: no midline tenderness, normal ROM  CHEST: no crepitus or bruising  CARD: S1, S2 normal; no murmurs, gallops, or rubs. +sinus bradycardia  RESP: No wheezes, rales or rhonchi.  ABD: +distended, non tender abdomen;   BACK: no midline tenderness or step offs  PELVIS: no laxity with lateral compression  EXT: no gross extremity injury  NEURO: gcs 15, moving all extremities grossly, following commands

## 2018-12-24 NOTE — CONSULT NOTE ADULT - ATTENDING COMMENTS
Patient with fall and apparent stable acute Lspine fracture.  Multiple medical issues lead to medical admission.
Patient with concordant pain, severe, L1 compression fracture    Will try bracing, pain medications, PT    If patient is not mobile due to pain in a few days, would recommend L1 kyphoplasty

## 2018-12-24 NOTE — CONSULT NOTE ADULT - ASSESSMENT
ASSESSMENT:  58y old F, w/ PMH of hypotension and other conditions listed above, s/p unwitnessed fall. +HT, +LOC, -AC.  Complains of pain and +TTP of lumbar spine.  CT A/P shows evidence of acute, mild L1 vertebral body compression fx w/o retropulsion. Other noteable non-traumatic findings noted above.    PLAN:    - f/u neurosurgery recommendations ASSESSMENT:  58y old F, w/ PMH of hypotension and other conditions listed above, s/p unwitnessed fall. +HT, +LOC, -AC.  Complains of pain and +TTP of lumbar spine.  CT A/P shows evidence of acute, mild L1 vertebral body compression fx w/o retropulsion. Other noteable non-traumatic findings noted above.    PLAN:    - f/u neurosurgery recommendations    Senior Trauma Resident Note  Airway intact  Bilateral Breath Sounds  Palpable pulses in 4 ext  GCS 15, PERRL, MOON  hemodynamically stable  No Subq emphysema, abdominal tenderness,  or pelvic instability   CXR and PXR negative  Ct findings above  Will Dispo accordingly  Plan as above d/w Dr. Swann

## 2018-12-24 NOTE — H&P ADULT - HISTORY OF PRESENT ILLNESS
58 year old female with pertinent medical history of cirrhosis / Primary Biliary Cirrhosis dx 2003 ( positive AMA), chronic Left upper quadrant pain, liver cirrhosis with portal hypertension, ascites, splenomegaly and esophageal varices, thrombocytopenia awaiting liver transplant at [facility]  recently d/c for exacerbation of abdominal pain now presents s/p syncope vs fall.     Pt lives at home, son heard her calling for help from the bathroom, found her on floor, pt does not recall how she ended up on floor, now c/o back pain, constant, worse with certain movements and position, better with rest, ? head trauma, + LOC, denies paresthesias, bleeding or other complaints at present. HR 50-60, mildly hypotensive, 58 year old female with pertinent medical history of cirrhosis / Primary Biliary Cirrhosis dx 2003 ( positive AMA), chronic Left upper quadrant pain, liver cirrhosis with portal hypertension, ascites, splenomegaly and esophageal varices, thrombocytopenia awaiting liver transplant at [facility]  recently d/c for exacerbation of abdominal pain now presents s/p fall.     Pt's son translated for me. Per son, he heard his mom call for him around 6am this morning. He found her slouched on her side with some drooling around her mouth. He and his wife who works as a PCA helped her to the couch where she was simply staring at them for about 10-15 secs.She thereafter complained of back pain. Pt only recalls washing her hands and not the event of falling on the bathroom floor. Family checked her SBP @ home 105 and son states since her hospital d/c she has felt better and her ascites and LE edema had improved significantly. Son is unsure if the pt ever LOC, and pt denied hx of seizures, fecal/urinary incontinence, CP, palpitations, bleeding

## 2018-12-24 NOTE — ED PROVIDER NOTE - ATTENDING CONTRIBUTION TO CARE
59 y/o female with h/o primary biliary cirrhosis, chronic LUQ pain, splenomegaly, appendectomy, recently d/c for exacerbation of abdominal pain now presents s/p syncope vs fall. Pt lives at home, son heard her calling for help from the bathroom, found her on floor, pt does not recall how she ended up on floor, now c/o back pain, constant, worse with certain movements and position, better with rest, ? head trauma, + LOC, denies paresthesias, bleeding or other complaints at present. HR 50-60, mildly hypotensive,     VSS, awake, alert, appears uncomfortable, Head NC / NT, PERRL / EOMI, no hemotympanum, no dental injury, no abrasions or lacerations, chest CTAB, chest wall NT, no w/r/r, +S1/S2, RRR, no m/r/g, abdomen soft, NT, ND, +BS, able to voluntarily actively rotate neck 45 degrees left and right on request, no midline spinal tenderness, no CVA tenderness, FROM x 4, no bony tenderness, alert and oriented to person, place and time, clear speech, cranial nerves II-XII are intact, upper and lower extremity strength is 5/5, GCS: 15

## 2018-12-24 NOTE — ED PROVIDER NOTE - PROGRESS NOTE DETAILS
pt mentating, HR 40s-50s, BP > 100 sys, POCUS echo show no effusion, mild hypokinesis, suspect medication related bradycardia, will give atropine if pt becomes significantly hypotensive or symptomatic. neurosurgery aware of consult.    trauma aware of consult. IC fellow to evaluate patient in the ED Spoke to Dr. Stauffer, cardio fellow, evaluated patient and advised to hold the propranolol, lasix; admit to tele ICU fellow to evaluate patient in the ED

## 2018-12-24 NOTE — ED PROVIDER NOTE - CARE PLAN
Principal Discharge DX:	Syncope  Secondary Diagnosis:	Bradycardia Principal Discharge DX:	Syncope  Secondary Diagnosis:	Bradycardia  Secondary Diagnosis:	Fall Principal Discharge DX:	Syncope  Secondary Diagnosis:	Bradycardia  Secondary Diagnosis:	Fall  Secondary Diagnosis:	Compression fracture of lumbar vertebra

## 2018-12-24 NOTE — CONSULT NOTE ADULT - SUBJECTIVE AND OBJECTIVE BOX
TRAUMA ACTIVATION LEVEL:  Alert    MECHANISM OF INJURY: [] Fall    GCS: 	E: 4	V: 5	M: 6      HPI: 57y/o F (Thai speaking), w/ extensive PMH listed below (including hypotension), presented to ED s/p fall. Pt states she fell in the bathroom early this morning, hitting her back and head. Pt does not recall the event, but remembers screaming for her son to come help her. Pt admits to lumbar pain. Denies HA, CP, SOB, dizziness/lightheadedness, n/v, fever/chills.     Pt was recently discharged from the hospital 2 days prior, for which her CC was chest and abdominal pain, found to have hypotension and ascites upon workup.       services used-- [facility]   ID# [id number]    PAST MEDICAL & SURGICAL HISTORY:  Splenomegaly  Hypotension  GERD (gastroesophageal reflux disease)  Liver cirrhosis  History of appendectomy  History of tonsillectomy      Allergies    No Known Allergies    Intolerances        Home Medications:  propranolol 20 mg oral tablet: 1 tab(s) orally once a day at 10AM (21 Dec 2018 11:14)  ursodiol 300 mg oral tablet: 1 tab(s) orally 3 times a day (15 Feb 2018 10:53)      ROS: 10-system review is otherwise negative except HPI above.      Primary Survey:    A - airway intact  B - bilateral breath sounds and good chest rise  C - palpable pulses in all extremities  D - GCS 15 on arrival, MOON  Exposure obtained    Vital Signs Last 24 Hrs  T(C): 35.8 (24 Dec 2018 06:50), Max: 35.8 (24 Dec 2018 06:50)  T(F): 96.4 (24 Dec 2018 06:50), Max: 96.4 (24 Dec 2018 06:50)  HR: 50 (24 Dec 2018 07:35) (50 - 60)  BP: 101/61 (24 Dec 2018 07:35) (93/63 - 106/60)  BP(mean): --  RR: 24 (24 Dec 2018 06:50) (24 - 24)  SpO2: 96% (24 Dec 2018 06:50) (96% - 96%)    Secondary Survey:   General: NAD  HEENT: Normocephalic, atraumatic, EOMI, PEERLA. no scalp lacerations   Neck: Soft, midline trachea. no cspine tenderness  Chest: No chest wall tenderness. or subq  emphysema   Cardiac: S1, S2, RRR  Respiratory: Bilateral breath sounds, clear and equal bilaterally, nonlabored, no wheeze/rales/ronchi  Abdomen: Soft, non-distended, non-tender, no rebound  Groin: Normal appearing, pelvis stable   Ext: b/l distal pulses palpable   Back: + TTP of lumbar spine, no palpable runoff/stepoff/deformity      LABS:  Labs:  CAPILLARY BLOOD GLUCOSE                              8.9    2.22  )-----------( 44       ( 24 Dec 2018 07:19 )             29.4       Auto Neutrophil %: 51.3 % (12-24-18 @ 07:19)  Auto Immature Granulocyte %: 0.9 % (12-24-18 @ 07:19)    12-24    137  |  104  |  12  ----------------------------<  93  3.9   |  19  |  0.9      Calcium, Total Serum: 7.8 mg/dL (12-24-18 @ 07:19)      LFTs:             8.2  | 1.0  | 42       ------------------[81      ( 24 Dec 2018 07:19 )  2.8  | x    | 14          Lipase:114    Amylase:x         Blood Gas Venous - Lactate: 1.7 mmoL/L (12-24-18 @ 07:44)      Coags:     18.30  ----< 1.60    ( 24 Dec 2018 07:19 )     26.9        CARDIAC MARKERS ( 24 Dec 2018 07:19 )  x     / <0.01 ng/mL / 78 U/L / x     / x                      RADIOLOGY & ADDITIONAL STUDIES:    < from: CT Head No Cont (12.24.18 @ 08:16) >  EXAM:  CT BRAIN      IMPRESSION:     Unremarkable noncontrast head CT.    < end of copied text >        < from: CT Cervical Spine No Cont (12.24.18 @ 08:18) >  EXAM:  CT CERVICAL SPINE      Impression:    No evidence of acute cervical spine injury.    < end of copied text >        < from: CT Chest w/ IV Cont (12.24.18 @ 08:19) >  EXAM:  CT ABDOMEN AND PELVIS IC        EXAM:  CT CHEST IC          IMPRESSION:   1. Acute, mild L1 vertebral body compression fracture, without   significant osseous retropulsion.  2. Liver cirrhosis with portal hypertension.  3. Dilated main pulmonary artery, 3.6 cm suggestive of pulmonary arterial   hypertension.    < end of copied text >      < from: Xray Pelvis AP only (12.24.18 @ 07:24) >  EXAM:  XR PELVIS AP ONLY 1-2 VIEWS     INTERPRETATION:    A frontal view of the bony pelvis reveals no bony, articular, or soft   tissue abnormality.  Impression:  Unremarkable pelvis.    < end of copied text >      < from: Xray Chest 1 View-PORTABLE IMMEDIATE (12.24.18 @ 07:24) >  EXAM:  XR CHEST PORTABLE IMMED 1V    Impression:      No radiographic evidence of acute cardiopulmonary disease.    Unchanged.    < end of copied text >      ---------------------------------------------------------------------------------------

## 2018-12-24 NOTE — ED PROVIDER NOTE - DIAGNOSTIC INTERPRETATION
EKG: NSR at 45 bpm, normal intervals and axis, no acute ischemic changes. EKG: NSR at 45 bpm, normal intervals and axis, no acute ischemic changes. Unchanged from 12/20/2018.

## 2018-12-24 NOTE — ED PROVIDER NOTE - NS ED ROS FT
Constitutional: See HPI.  Eyes: No visual changes, eye pain or discharge.  ENMT: No hearing changes, pain, discharge or infections. No neck pain or stiffness.  Cardiac: No chest pain, SOB or edema. No chest pain with exertion.  Respiratory: No cough or respiratory distress.   GI: No nausea, vomiting, diarrhea or abdominal pain.  : No dysuria, frequency or burning.  MS: +back pain; No myalgia, muscle weakness, joint pain or back pain.  Neuro: No headache or weakness. No LOC.  Skin: No skin rash.  Endo: No hx of DM, thyroid disease  Except as documented in HPI, all other review of systems is negative

## 2018-12-24 NOTE — H&P ADULT - NSHPLABSRESULTS_GEN_ALL_CORE
T(F): 96.4  HR: 50  BP: 101/61  RR: 24  SpO2: 96%                          8.9    2.22  )-----------( 44       ( 24 Dec 2018 07:19 )             29.4       12-24    137  |  104  |  12  ----------------------------<  93  3.9   |  19  |  0.9    Ca    7.8<L>      24 Dec 2018 07:19  Mg     2.0     12-24    TPro  8.2<H>  /  Alb  2.8<L>  /  TBili  1.0  /  DBili  x   /  AST  42<H>  /  ALT  14  /  AlkPhos  81  12-24      LIVER FUNCTIONS - ( 24 Dec 2018 07:19 )  Alb: 2.8 g/dL / Pro: 8.2 g/dL / ALK PHOS: 81 U/L / ALT: 14 U/L / AST: 42 U/L / GGT: x             PT/INR - ( 24 Dec 2018 07:19 )   PT: 18.30 sec;   INR: 1.60 ratio         PTT - ( 24 Dec 2018 07:19 )  PTT:26.9 sec    CARDIAC MARKERS ( 24 Dec 2018 07:19 )  x     / <0.01 ng/mL / 78 U/L / x     / x            Thyroid Stimulating Hormone, Serum: 21.42 uIU/mL (12-17-18 @ 06:29)    CT chest   IMPRESSION:   1. Acute, mild L1 vertebral body compression fracture, without   significant osseous retropulsion.  2. Liver cirrhosis with portal hypertension.  3. Dilated main pulmonary artery, 3.6 cm suggestive of pulmonary arterial   hypertension.    CT abdomen  IMPRESSION:   1. Acute, mild L1 vertebral body compression fracture, without   significant osseous retropulsion.  2. Liver cirrhosis with portal hypertension.  3. Dilated main pulmonary artery, 3.6 cm suggestive of pulmonary arterial   hypertension.    CTH  IMPRESSION:   Unremarkable noncontrast head CT.    CT  C spine  Impression:  No evidence of acute cervical spine injury.    CXR  Impression:    No radiographic evidence of acute cardiopulmonary disease.  Unchanged.    XR pelvis  A frontal view of the bony pelvis reveals no bony, articular, or soft   tissue abnormality.  Impression:  Unremarkable pelvis.     EKG  - Sinus Bradycardia  - 1 degree AV block  - RAD and RVH  - Unchanged from 12/20/18 T(F): 96.4  HR: 50  BP: 101/61  RR: 24  SpO2: 96%                          8.9    2.22  )-----------( 44       ( 24 Dec 2018 07:19 )             29.4       12-24    137  |  104  |  12  ----------------------------<  93  3.9   |  19  |  0.9    Ca    7.8<L>      24 Dec 2018 07:19  Mg     2.0     12-24    TPro  8.2<H>  /  Alb  2.8<L>  /  TBili  1.0  /  DBili  x   /  AST  42<H>  /  ALT  14  /  AlkPhos  81  12-24      LIVER FUNCTIONS - ( 24 Dec 2018 07:19 )  Alb: 2.8 g/dL / Pro: 8.2 g/dL / ALK PHOS: 81 U/L / ALT: 14 U/L / AST: 42 U/L / GGT: x             PT/INR - ( 24 Dec 2018 07:19 )   PT: 18.30 sec;   INR: 1.60 ratio         PTT - ( 24 Dec 2018 07:19 )  PTT:26.9 sec    CARDIAC MARKERS ( 24 Dec 2018 07:19 )  x     / <0.01 ng/mL / 78 U/L / x     / x            Thyroid Stimulating Hormone, Serum: 21.42 uIU/mL (12-17-18 @ 06:29)    CT chest   IMPRESSION:   1. Acute, mild L1 vertebral body compression fracture, without   significant osseous retropulsion.  2. Liver cirrhosis with portal hypertension.  3. Dilated main pulmonary artery, 3.6 cm suggestive of pulmonary arterial   hypertension.    CT abdomen  IMPRESSION:   1. Acute, mild L1 vertebral body compression fracture, without   significant osseous retropulsion.  2. Liver cirrhosis with portal hypertension.  3. Dilated main pulmonary artery, 3.6 cm suggestive of pulmonary arterial   hypertension.    CTH  IMPRESSION:   Unremarkable noncontrast head CT.    CT  C spine  Impression:  No evidence of acute cervical spine injury.    CXR which I reviewed shows   No radiographic evidence of acute cardiopulmonary disease.  Unchanged.    XR pelvis  A frontal view of the bony pelvis reveals no bony, articular, or soft   tissue abnormality.  Impression:  Unremarkable pelvis.     EKG which I reviewed shows NSR

## 2018-12-24 NOTE — ED ADULT NURSE NOTE - PMH
Liver cirrhosis GERD (gastroesophageal reflux disease)    Hypotension    Liver cirrhosis    Splenomegaly

## 2018-12-24 NOTE — CONSULT NOTE ADULT - SUBJECTIVE AND OBJECTIVE BOX
HPI:  58 year old female with pertinent medical history of cirrhosis / Primary Biliary Cirrhosis dx 2003 ( positive AMA), chronic Left upper quadrant pain, liver cirrhosis with portal hypertension, ascites, splenomegaly and esophageal varices, thrombocytopenia awaiting liver transplant at [facility]  recently d/c for exacerbation of abdominal pain now presents s/p fall.     Pt's son translated for me. Per son, he heard his mom call for him around 6am this morning. He found her slouched on her side with some drooling around her mouth. He and his wife who works as a PCA helped her to the couch where she was simply staring at them for about 10-15 secs.She thereafter complained of back pain. Pt only recalls washing her hands and not the event of falling on the bathroom floor. Family checked her SBP @ home 105 and son states since her hospital d/c she has felt better and her ascites and LE edema had improved significantly. Son is unsure if the pt ever LOC, and pt denied hx of seizures, fecal/urinary incontinence, CP, palpitations, bleeding (24 Dec 2018 12:57)    Pt seen and examined at bedside with family. Pt admits to back pain loacalized to mid to low back and no radicular pain or paresthesias.         PAST MEDICAL & SURGICAL HISTORY:  Splenomegaly  Hypotension  GERD (gastroesophageal reflux disease)  Liver cirrhosis  History of appendectomy  History of tonsillectomy      Home Medications:  propranolol 20 mg oral tablet: 1 tab(s) orally once a day at 10AM (21 Dec 2018 11:14)  ursodiol 300 mg oral tablet: 1 tab(s) orally 3 times a day (15 Feb 2018 10:53)      Allergies    No Known Allergies    Intolerances        MEDICATIONS  (STANDING):  docusate sodium 100 milliGRAM(s) Oral daily  furosemide    Tablet 20 milliGRAM(s) Oral daily  levothyroxine 50 MICROGram(s) Oral daily  lidocaine   Patch 1 Patch Transdermal daily  midodrine 10 milliGRAM(s) Oral three times a day  oxyCODONE  ER Tablet 10 milliGRAM(s) Oral every 12 hours  pantoprazole    Tablet 40 milliGRAM(s) Oral before breakfast  propranolol 20 milliGRAM(s) Oral daily  senna 2 Tablet(s) Oral at bedtime  ursodiol Capsule 300 milliGRAM(s) Oral every 8 hours    MEDICATIONS  (PRN):  oxyCODONE    IR 5 milliGRAM(s) Oral three times a day PRN Severe Pain (7 - 10)      ICU Vital Signs Last 24 Hrs  T(C): 35.8 (24 Dec 2018 06:50), Max: 35.8 (24 Dec 2018 06:50)  T(F): 96.4 (24 Dec 2018 06:50), Max: 96.4 (24 Dec 2018 06:50)  HR: 50 (24 Dec 2018 07:35) (50 - 60)  BP: 101/61 (24 Dec 2018 07:35) (93/63 - 106/60)  BP(mean): --  ABP: --  ABP(mean): --  RR: 24 (24 Dec 2018 06:50) (24 - 24)  SpO2: 96% (24 Dec 2018 06:50) (96% - 96%)      I&O's Detail      CBC Full  -  ( 24 Dec 2018 07:19 )  WBC Count : 2.22 K/uL  Hemoglobin : 8.9 g/dL  Hematocrit : 29.4 %  Platelet Count - Automated : 44 K/uL  Mean Cell Volume : 89.4 fL  Mean Cell Hemoglobin : 27.1 pg  Mean Cell Hemoglobin Concentration : 30.3 g/dL  Auto Neutrophil # : 1.14 K/uL  Auto Lymphocyte # : 0.86 K/uL  Auto Monocyte # : 0.17 K/uL  Auto Eosinophil # : 0.03 K/uL  Auto Basophil # : 0.00 K/uL  Auto Neutrophil % : 51.3 %  Auto Lymphocyte % : 38.7 %  Auto Monocyte % : 7.7 %  Auto Eosinophil % : 1.4 %  Auto Basophil % : 0.0 %    12-24    137  |  104  |  12  ----------------------------<  93  3.9   |  19  |  0.9    Ca    7.8<L>      24 Dec 2018 07:19  Mg     2.0     12-24    TPro  8.2<H>  /  Alb  2.8<L>  /  TBili  1.0  /  DBili  x   /  AST  42<H>  /  ALT  14  /  AlkPhos  81  12-24    CARDIAC MARKERS ( 24 Dec 2018 07:19 )  x     / <0.01 ng/mL / 78 U/L / x     / x            Neuro exam:  AAOX3. Verbal function intact  tongue midline, facial motions symmetric  PERRL  Motor: MAEx4, equal b/l  Sensation: intact to touch in all extremities        Imaging:  < from: CT Abdomen and Pelvis w/ IV Cont (12.24.18 @ 08:19) >  IMPRESSION:   1. Acute, mild L1 vertebral body compression fracture, without   significant osseous retropulsion.  2. Liver cirrhosis with portal hypertension.  3. Dilated main pulmonary artery, 3.6 cm suggestive of pulmonary arterial   hypertension.      LAUREN LENZ M.D., ATTENDING RADIOLOGIST  This document has been electronically signed. Dec 24 2018  8:33AM    < end of copied text >      Assessment/Plan  TLSO for comfort, may ambulate without it  OOB to chair and ambulate  PT/rehab  pain control  if pain persists and fails conservative management , may consider MRI L spine and re-call PRN  d/w attending HPI:  58 year old female with pertinent medical history of cirrhosis / Primary Biliary Cirrhosis dx 2003 ( positive AMA), chronic Left upper quadrant pain, liver cirrhosis with portal hypertension, ascites, splenomegaly and esophageal varices, thrombocytopenia awaiting liver transplant at [facility]  recently d/c for exacerbation of abdominal pain now presents s/p fall.     Pt's son translated for me. Per son, he heard his mom call for him around 6am this morning. He found her slouched on her side with some drooling around her mouth. He and his wife who works as a PCA helped her to the couch where she was simply staring at them for about 10-15 secs.She thereafter complained of back pain. Pt only recalls washing her hands and not the event of falling on the bathroom floor. Family checked her SBP @ home 105 and son states since her hospital d/c she has felt better and her ascites and LE edema had improved significantly. Son is unsure if the pt ever LOC, and pt denied hx of seizures, fecal/urinary incontinence, CP, palpitations, bleeding (24 Dec 2018 12:57)    Pt seen and examined at bedside with family. Pt admits to back pain loacalized to mid to low back and no radicular pain or paresthesias.         PAST MEDICAL & SURGICAL HISTORY:  Splenomegaly  Hypotension  GERD (gastroesophageal reflux disease)  Liver cirrhosis  History of appendectomy  History of tonsillectomy      Home Medications:  propranolol 20 mg oral tablet: 1 tab(s) orally once a day at 10AM (21 Dec 2018 11:14)  ursodiol 300 mg oral tablet: 1 tab(s) orally 3 times a day (15 Feb 2018 10:53)      Allergies    No Known Allergies    Intolerances        MEDICATIONS  (STANDING):  docusate sodium 100 milliGRAM(s) Oral daily  furosemide    Tablet 20 milliGRAM(s) Oral daily  levothyroxine 50 MICROGram(s) Oral daily  lidocaine   Patch 1 Patch Transdermal daily  midodrine 10 milliGRAM(s) Oral three times a day  oxyCODONE  ER Tablet 10 milliGRAM(s) Oral every 12 hours  pantoprazole    Tablet 40 milliGRAM(s) Oral before breakfast  propranolol 20 milliGRAM(s) Oral daily  senna 2 Tablet(s) Oral at bedtime  ursodiol Capsule 300 milliGRAM(s) Oral every 8 hours    MEDICATIONS  (PRN):  oxyCODONE    IR 5 milliGRAM(s) Oral three times a day PRN Severe Pain (7 - 10)      ICU Vital Signs Last 24 Hrs  T(C): 35.8 (24 Dec 2018 06:50), Max: 35.8 (24 Dec 2018 06:50)  T(F): 96.4 (24 Dec 2018 06:50), Max: 96.4 (24 Dec 2018 06:50)  HR: 50 (24 Dec 2018 07:35) (50 - 60)  BP: 101/61 (24 Dec 2018 07:35) (93/63 - 106/60)  BP(mean): --  ABP: --  ABP(mean): --  RR: 24 (24 Dec 2018 06:50) (24 - 24)  SpO2: 96% (24 Dec 2018 06:50) (96% - 96%)      I&O's Detail      CBC Full  -  ( 24 Dec 2018 07:19 )  WBC Count : 2.22 K/uL  Hemoglobin : 8.9 g/dL  Hematocrit : 29.4 %  Platelet Count - Automated : 44 K/uL  Mean Cell Volume : 89.4 fL  Mean Cell Hemoglobin : 27.1 pg  Mean Cell Hemoglobin Concentration : 30.3 g/dL  Auto Neutrophil # : 1.14 K/uL  Auto Lymphocyte # : 0.86 K/uL  Auto Monocyte # : 0.17 K/uL  Auto Eosinophil # : 0.03 K/uL  Auto Basophil # : 0.00 K/uL  Auto Neutrophil % : 51.3 %  Auto Lymphocyte % : 38.7 %  Auto Monocyte % : 7.7 %  Auto Eosinophil % : 1.4 %  Auto Basophil % : 0.0 %    12-24    137  |  104  |  12  ----------------------------<  93  3.9   |  19  |  0.9    Ca    7.8<L>      24 Dec 2018 07:19  Mg     2.0     12-24    TPro  8.2<H>  /  Alb  2.8<L>  /  TBili  1.0  /  DBili  x   /  AST  42<H>  /  ALT  14  /  AlkPhos  81  12-24    CARDIAC MARKERS ( 24 Dec 2018 07:19 )  x     / <0.01 ng/mL / 78 U/L / x     / x            Neuro exam:  AAOX3. Verbal function intact  tongue midline, facial motions symmetric  PERRL  Motor: MAEx4, equal b/l  Sensation: intact to touch in all extremities        Imaging:  < from: CT Abdomen and Pelvis w/ IV Cont (12.24.18 @ 08:19) >  IMPRESSION:   1. Acute, mild L1 vertebral body compression fracture, without   significant osseous retropulsion.  2. Liver cirrhosis with portal hypertension.  3. Dilated main pulmonary artery, 3.6 cm suggestive of pulmonary arterial   hypertension.      LAUREN LENZ M.D., ATTENDING RADIOLOGIST  This document has been electronically signed. Dec 24 2018  8:33AM    < end of copied text >      Assessment/Plan  TLSO for comfort, may ambulate without it  OOB to chair and ambulate  PT/rehab  pain control  d/w attending

## 2018-12-24 NOTE — ED ADULT NURSE NOTE - NSIMPLEMENTINTERV_GEN_ALL_ED
Implemented All Fall with Harm Risk Interventions:  [place] to call system. Call bell, personal items and telephone within reach. Instruct patient to call for assistance. Room bathroom lighting operational. Non-slip footwear when patient is off stretcher. Physically safe environment: no spills, clutter or unnecessary equipment. Stretcher in lowest position, wheels locked, appropriate side rails in place. Provide visual cue, wrist band, yellow gown, etc. Monitor gait and stability. Monitor for mental status changes and reorient to person, place, and time. Review medications for side effects contributing to fall risk. Reinforce activity limits and safety measures with patient and family. Provide visual clues: red socks.

## 2018-12-24 NOTE — H&P ADULT - NSHPPHYSICALEXAM_GEN_ALL_CORE
T(F): 96.4  HR: 50  BP: 101/61  RR: 24  SpO2: 96%      PHYSICAL EXAM:  GENERAL: NAD, well-developed  HEAD:  Atraumatic, Normocephalic  EYES: EOMI, PERRLA, conjunctiva and sclera clear  NECK: Supple, No JVD  CHEST/LUNG: Clear to auscultation bilaterally; No wheeze  HEART: Regular rate and rhythm; No murmurs, rubs, or gallops  ABDOMEN: Soft, Nontender, Nondistended; Bowel sounds present  EXTREMITIES:  2+ Peripheral Pulses, No clubbing, cyanosis, or edema  PSYCH: AAOx3  NEUROLOGY: non-focal  SKIN: No rashes or lesions T(F): 96.4  HR: 50  BP: 101/61  RR: 24  SpO2: 96%      PHYSICAL EXAM:  GENERAL: NAD, well-developed  HEAD:  Atraumatic, Normocephalic  NECK: Supple, No JVD  CHEST/LUNG: Clear to auscultation bilaterally anteriorly, unable to auscultate posteriorly pt in pain when moved  HEART: Regular rate and rhythm; No murmurs, rubs, or gallops  ABDOMEN: Soft, Nontender, Nondistended; Bowel sounds present. No significant ascites noted   EXTREMITIES:  2+ Peripheral Pulses, No clubbing, cyanosis, or edema  PSYCH: AAOx3  NEUROLOGY: non-focal  SKIN: No rashes or lesions

## 2018-12-24 NOTE — ED PROVIDER NOTE - CRITICAL CARE PROVIDED
documentation/interpretation of diagnostic studies/consultation with other physicians/consult w/ pt's family directly relating to pts condition/direct patient care (not related to procedure)/additional history taking

## 2018-12-24 NOTE — H&P ADULT - ASSESSMENT
# Syncope    2/2 hypotension, bradycardia  - Bedside ECHO negative for effusion      #Bradycardia  - HR 50    #Acute, mild L1 vertebral body compression fracture  - Awaiting neuro recs  - Trauma recs noted     #  PBC complicated by Portal HTN, Esophageal Varices s/p band , Moderate Ascites   - Restart  lasix and Propranolol with hold parameters from tomorrow SBP<90 HR< 55  - Patient has INR > 1.6   - Pt had hypotension last admission and her Aldactone was decreased to 50 from 100.  - c/w Midodrine  - Continue with Ursodiol for now, avoid Hepatoxic agents  - Pt was suppose to follow for transplant in [facility] on Jan 2, 2019      #Pulmonary arterial hypertension likely complication of cirrhosis  -f/u o/p pulmonary    #Hypothyroidism  - TSH 21.42 and low T4 3.3  - Per last notes, pt was suppose to be on synthroid was 50mcg    # Pancytopenia  -No signs of sepsis  a)thrombocytopenia  - stable  - Baseline platelet count around 50 k (12/16/18)  b) Normocytic Anemia  - stable    # Corrected Calcium  - 8.76    # GI PPx - (X)Protonix () Not indicated    # DVT PPx - SCD, hold heparin/lovenox    # Activity -  (X) Ambulate as Tolerated     # Code Status - (X) FULL    () DNR / DNI 58 year old female with pertinent medical history of cirrhosis / Primary Biliary Cirrhosis dx 2003 ( positive AMA), chronic Left upper quadrant pain, liver cirrhosis with portal hypertension, ascites, splenomegaly and esophageal varices, thrombocytopenia awaiting liver transplant at [facility]  recently d/c for exacerbation of abdominal pain now presents s/p fall.     # Unwitnessed Fall with confusion likely 2/2 hypotension, bradycardia vs seizure  - syncope unlikely since she was calling her son's name   - ? seizure, pt was drooling and confused for a few secs   - Bedside ECHO negative for effusion in the ED  - Advised family to help pt, check orthostatics  - c/w telemetry monitoring  - c/w Midodrine for BP, per son her SBP @ home was 105    #Bradycardia likely 2/2 cirrhosis  - HR 50 and last EKG 12/20 also revealed bradycardia  - c/w tele monitoring   - c/w pacing pads  - Pt has positive chronotropic response, HR increased 61 with hand movements     #Acute, mild L1 vertebral body compression fracture  - Start oxycodone and lidocaine patch for pain control  - Awaiting neurosurgery recs, pt has pain with movement   - Pain with leg movement, although able to elevate   - Trauma recs noted     #  PBC complicated by Portal HTN, Esophageal Varices s/p band  - No significant ascites noted  - Ammonia-74, pt is A&Ox 3  - Restart  lasix and Propranolol with hold parameters from tomorrow hold SBP<90 HR< 55  - Patient has INR > 1.6   - Pt had hypotension last admission and her Aldactone was decreased to 50 from 100.  - c/w Midodrine  - Continue with Ursodiol for now, avoid Hepatoxic agents  - Pt was suppose to follow for transplant in [facility] on Jan 4, 2019    #Pulmonary arterial hypertension likely complication of cirrhosis  -f/u o/p pulmonary    #Hypothyroidism  - TSH 21.42 and low T4 3.3  - Per last notes, pt was suppose to be on synthroid was 50mcg    # Pancytopenia  -No signs of sepsis  a)thrombocytopenia  - stable  - Baseline platelet count around 50 k (12/16/18)  b) Normocytic Anemia  - stable    # Corrected Calcium  - 8.76    # GI PPx - (X)Protonix () Not indicated    # DVT PPx - SCD, hold heparin/lovenox    # Activity -  (X) Ambulate as Tolerated     # Code Status - (X) FULL    () DNR / DNI 58 year old female with pertinent medical history of cirrhosis / Primary Biliary Cirrhosis dx 2003 ( positive AMA), chronic Left upper quadrant pain, liver cirrhosis with portal hypertension, ascites, splenomegaly and esophageal varices, thrombocytopenia awaiting liver transplant at [facility]  recently d/c for exacerbation of abdominal pain now presents s/p fall.     # Unwitnessed Fall with confusion likely 2/2 hypotension which is secondary to liver cirrhosis   - syncope unlikely since she was calling her son's name   - ? seizure, pt was drooling and confused for a few secs get EEG   - Bedside ECHO negative for effusion in the ED recen echo shows normal EF from l  - check orthostatics  - c/w telemetry monitoring  - midodrine increased to 10 mg TID during last admission     #Acute, mild L1 vertebral body compression fracture s/p fall   - Start oxycodone and lidocaine patch for pain control  - per NS TLSO brace and if to consider kyphoplasty     #  PBC complicated by Portal HTN, Esophageal Varices s/p band  - No significant ascites noted  - Ammonia-74, pt is A&Ox 3  - Restart  lasix and Propranolol with hold parameters from tomorrow hold SBP<90 HR< 55  - Patient has INR 1.6 monitor   - Pt had hypotension last admission and her Aldactone was stopped   - c/w Midodrine  - Continue with Ursodiol for now, avoid Hepatoxic agents  - Pt was suppose to follow for transplant in [facility] on Jan 4, 2019    #Pulmonary arterial hypertension likely complication of cirrhosis  -f/u o/p pulmonary    #Hypothyroidism  - TSH 21.42 and low T4 3.3  - Per last notes, patient on levothyroxne     # Pancytopenia from cirrhosis   -No signs of sepsis  a)thrombocytopenia  - stable  - Baseline platelet count around 50 k (12/16/18)  b) Normocytic Anemia  - stable    # Corrected Calcium  - 8.76    # GI PPx - (X)Protonix () Not indicated    # DVT PPx - SCD, hold heparin/lovenox    # Activity -  (X) Ambulate as Tolerated     #PT rehab     # Code Status - (X) FULL    () DNR / DNI

## 2018-12-25 LAB
ALBUMIN SERPL ELPH-MCNC: 2.4 G/DL — LOW (ref 3.5–5.2)
ALP SERPL-CCNC: 70 U/L — SIGNIFICANT CHANGE UP (ref 30–115)
ALT FLD-CCNC: 13 U/L — SIGNIFICANT CHANGE UP (ref 0–41)
ANION GAP SERPL CALC-SCNC: 15 MMOL/L — HIGH (ref 7–14)
ANION GAP SERPL CALC-SCNC: 15 MMOL/L — HIGH (ref 7–14)
AST SERPL-CCNC: 37 U/L — SIGNIFICANT CHANGE UP (ref 0–41)
BASOPHILS # BLD AUTO: 0.01 K/UL — SIGNIFICANT CHANGE UP (ref 0–0.2)
BASOPHILS NFR BLD AUTO: 0.5 % — SIGNIFICANT CHANGE UP (ref 0–1)
BILIRUB SERPL-MCNC: 1.3 MG/DL — HIGH (ref 0.2–1.2)
BUN SERPL-MCNC: 15 MG/DL — SIGNIFICANT CHANGE UP (ref 10–20)
BUN SERPL-MCNC: 15 MG/DL — SIGNIFICANT CHANGE UP (ref 10–20)
CALCIUM SERPL-MCNC: 7.5 MG/DL — LOW (ref 8.5–10.1)
CALCIUM SERPL-MCNC: 7.7 MG/DL — LOW (ref 8.5–10.1)
CHLORIDE SERPL-SCNC: 104 MMOL/L — SIGNIFICANT CHANGE UP (ref 98–110)
CHLORIDE SERPL-SCNC: 104 MMOL/L — SIGNIFICANT CHANGE UP (ref 98–110)
CO2 SERPL-SCNC: 18 MMOL/L — SIGNIFICANT CHANGE UP (ref 17–32)
CO2 SERPL-SCNC: 18 MMOL/L — SIGNIFICANT CHANGE UP (ref 17–32)
CREAT SERPL-MCNC: 0.8 MG/DL — SIGNIFICANT CHANGE UP (ref 0.7–1.5)
CREAT SERPL-MCNC: 0.8 MG/DL — SIGNIFICANT CHANGE UP (ref 0.7–1.5)
EOSINOPHIL # BLD AUTO: 0.06 K/UL — SIGNIFICANT CHANGE UP (ref 0–0.7)
EOSINOPHIL NFR BLD AUTO: 2.8 % — SIGNIFICANT CHANGE UP (ref 0–8)
GLUCOSE SERPL-MCNC: 83 MG/DL — SIGNIFICANT CHANGE UP (ref 70–99)
GLUCOSE SERPL-MCNC: 90 MG/DL — SIGNIFICANT CHANGE UP (ref 70–99)
HCT VFR BLD CALC: 26.7 % — LOW (ref 37–47)
HCT VFR BLD CALC: 27.7 % — LOW (ref 37–47)
HGB BLD-MCNC: 8.1 G/DL — LOW (ref 12–16)
HGB BLD-MCNC: 8.4 G/DL — LOW (ref 12–16)
IMM GRANULOCYTES NFR BLD AUTO: 0.5 % — HIGH (ref 0.1–0.3)
INR BLD: 1.68 RATIO — HIGH (ref 0.65–1.3)
LYMPHOCYTES # BLD AUTO: 0.8 K/UL — LOW (ref 1.2–3.4)
LYMPHOCYTES # BLD AUTO: 37.2 % — SIGNIFICANT CHANGE UP (ref 20.5–51.1)
MAGNESIUM SERPL-MCNC: 1.9 MG/DL — SIGNIFICANT CHANGE UP (ref 1.8–2.4)
MCHC RBC-ENTMCNC: 26.9 PG — LOW (ref 27–31)
MCHC RBC-ENTMCNC: 27 PG — SIGNIFICANT CHANGE UP (ref 27–31)
MCHC RBC-ENTMCNC: 30.3 G/DL — LOW (ref 32–37)
MCHC RBC-ENTMCNC: 30.3 G/DL — LOW (ref 32–37)
MCV RBC AUTO: 88.8 FL — SIGNIFICANT CHANGE UP (ref 81–99)
MCV RBC AUTO: 89 FL — SIGNIFICANT CHANGE UP (ref 81–99)
MONOCYTES # BLD AUTO: 0.22 K/UL — SIGNIFICANT CHANGE UP (ref 0.1–0.6)
MONOCYTES NFR BLD AUTO: 10.2 % — HIGH (ref 1.7–9.3)
NEUTROPHILS # BLD AUTO: 1.05 K/UL — LOW (ref 1.4–6.5)
NEUTROPHILS NFR BLD AUTO: 48.8 % — SIGNIFICANT CHANGE UP (ref 42.2–75.2)
NRBC # BLD: 0 /100 WBCS — SIGNIFICANT CHANGE UP (ref 0–0)
PLATELET # BLD AUTO: 38 K/UL — LOW (ref 130–400)
PLATELET # BLD AUTO: 42 K/UL — LOW (ref 130–400)
POTASSIUM SERPL-MCNC: 3.6 MMOL/L — SIGNIFICANT CHANGE UP (ref 3.5–5)
POTASSIUM SERPL-MCNC: 4 MMOL/L — SIGNIFICANT CHANGE UP (ref 3.5–5)
POTASSIUM SERPL-SCNC: 3.6 MMOL/L — SIGNIFICANT CHANGE UP (ref 3.5–5)
POTASSIUM SERPL-SCNC: 4 MMOL/L — SIGNIFICANT CHANGE UP (ref 3.5–5)
PROT SERPL-MCNC: 7.6 G/DL — SIGNIFICANT CHANGE UP (ref 6–8)
PROTHROM AB SERPL-ACNC: 19.2 SEC — HIGH (ref 9.95–12.87)
RBC # BLD: 3 M/UL — LOW (ref 4.2–5.4)
RBC # BLD: 3.12 M/UL — LOW (ref 4.2–5.4)
RBC # FLD: 19.8 % — HIGH (ref 11.5–14.5)
RBC # FLD: 19.9 % — HIGH (ref 11.5–14.5)
SODIUM SERPL-SCNC: 137 MMOL/L — SIGNIFICANT CHANGE UP (ref 135–146)
SODIUM SERPL-SCNC: 137 MMOL/L — SIGNIFICANT CHANGE UP (ref 135–146)
WBC # BLD: 2.15 K/UL — LOW (ref 4.8–10.8)
WBC # BLD: 2.34 K/UL — LOW (ref 4.8–10.8)
WBC # FLD AUTO: 2.15 K/UL — LOW (ref 4.8–10.8)
WBC # FLD AUTO: 2.34 K/UL — LOW (ref 4.8–10.8)

## 2018-12-25 PROCEDURE — 99232 SBSQ HOSP IP/OBS MODERATE 35: CPT

## 2018-12-25 RX ADMIN — LIDOCAINE 1 PATCH: 4 CREAM TOPICAL at 17:58

## 2018-12-25 RX ADMIN — Medication 50 MICROGRAM(S): at 05:13

## 2018-12-25 RX ADMIN — MIDODRINE HYDROCHLORIDE 10 MILLIGRAM(S): 2.5 TABLET ORAL at 05:13

## 2018-12-25 RX ADMIN — LIDOCAINE 1 PATCH: 4 CREAM TOPICAL at 05:09

## 2018-12-25 RX ADMIN — OXYCODONE HYDROCHLORIDE 10 MILLIGRAM(S): 5 TABLET ORAL at 17:57

## 2018-12-25 RX ADMIN — PANTOPRAZOLE SODIUM 40 MILLIGRAM(S): 20 TABLET, DELAYED RELEASE ORAL at 05:12

## 2018-12-25 RX ADMIN — URSODIOL 300 MILLIGRAM(S): 250 TABLET, FILM COATED ORAL at 22:28

## 2018-12-25 RX ADMIN — LIDOCAINE 1 PATCH: 4 CREAM TOPICAL at 05:10

## 2018-12-25 RX ADMIN — MIDODRINE HYDROCHLORIDE 10 MILLIGRAM(S): 2.5 TABLET ORAL at 14:39

## 2018-12-25 RX ADMIN — MIDODRINE HYDROCHLORIDE 10 MILLIGRAM(S): 2.5 TABLET ORAL at 22:27

## 2018-12-25 RX ADMIN — URSODIOL 300 MILLIGRAM(S): 250 TABLET, FILM COATED ORAL at 05:12

## 2018-12-25 RX ADMIN — SENNA PLUS 2 TABLET(S): 8.6 TABLET ORAL at 22:27

## 2018-12-25 RX ADMIN — URSODIOL 300 MILLIGRAM(S): 250 TABLET, FILM COATED ORAL at 14:39

## 2018-12-25 RX ADMIN — LIDOCAINE 1 PATCH: 4 CREAM TOPICAL at 14:40

## 2018-12-25 RX ADMIN — OXYCODONE HYDROCHLORIDE 10 MILLIGRAM(S): 5 TABLET ORAL at 17:58

## 2018-12-26 RX ORDER — LACTULOSE 10 G/15ML
15 SOLUTION ORAL
Qty: 0 | Refills: 0 | Status: DISCONTINUED | OUTPATIENT
Start: 2018-12-26 | End: 2018-12-28

## 2018-12-26 RX ORDER — PROPRANOLOL HCL 160 MG
10 CAPSULE, EXTENDED RELEASE 24HR ORAL DAILY
Qty: 0 | Refills: 0 | Status: DISCONTINUED | OUTPATIENT
Start: 2018-12-26 | End: 2018-12-26

## 2018-12-26 RX ORDER — LEVOTHYROXINE SODIUM 125 MCG
50 TABLET ORAL DAILY
Qty: 0 | Refills: 0 | Status: DISCONTINUED | OUTPATIENT
Start: 2018-12-26 | End: 2018-12-28

## 2018-12-26 RX ORDER — ONDANSETRON 8 MG/1
4 TABLET, FILM COATED ORAL ONCE
Qty: 0 | Refills: 0 | Status: COMPLETED | OUTPATIENT
Start: 2018-12-26 | End: 2018-12-26

## 2018-12-26 RX ORDER — PROPRANOLOL HCL 160 MG
10 CAPSULE, EXTENDED RELEASE 24HR ORAL DAILY
Qty: 0 | Refills: 0 | Status: DISCONTINUED | OUTPATIENT
Start: 2018-12-27 | End: 2018-12-28

## 2018-12-26 RX ORDER — METHOCARBAMOL 500 MG/1
750 TABLET, FILM COATED ORAL THREE TIMES A DAY
Qty: 0 | Refills: 0 | Status: DISCONTINUED | OUTPATIENT
Start: 2018-12-26 | End: 2018-12-27

## 2018-12-26 RX ORDER — OXYCODONE HYDROCHLORIDE 5 MG/1
5 TABLET ORAL
Qty: 0 | Refills: 0 | Status: DISCONTINUED | OUTPATIENT
Start: 2018-12-26 | End: 2018-12-28

## 2018-12-26 RX ADMIN — MIDODRINE HYDROCHLORIDE 10 MILLIGRAM(S): 2.5 TABLET ORAL at 22:47

## 2018-12-26 RX ADMIN — LACTULOSE 15 GRAM(S): 10 SOLUTION ORAL at 17:45

## 2018-12-26 RX ADMIN — URSODIOL 300 MILLIGRAM(S): 250 TABLET, FILM COATED ORAL at 06:28

## 2018-12-26 RX ADMIN — OXYCODONE HYDROCHLORIDE 5 MILLIGRAM(S): 5 TABLET ORAL at 07:17

## 2018-12-26 RX ADMIN — METHOCARBAMOL 750 MILLIGRAM(S): 500 TABLET, FILM COATED ORAL at 11:59

## 2018-12-26 RX ADMIN — URSODIOL 300 MILLIGRAM(S): 250 TABLET, FILM COATED ORAL at 22:48

## 2018-12-26 RX ADMIN — LACTULOSE 15 GRAM(S): 10 SOLUTION ORAL at 11:59

## 2018-12-26 RX ADMIN — URSODIOL 300 MILLIGRAM(S): 250 TABLET, FILM COATED ORAL at 13:45

## 2018-12-26 RX ADMIN — MIDODRINE HYDROCHLORIDE 10 MILLIGRAM(S): 2.5 TABLET ORAL at 06:27

## 2018-12-26 RX ADMIN — MIDODRINE HYDROCHLORIDE 10 MILLIGRAM(S): 2.5 TABLET ORAL at 13:45

## 2018-12-26 RX ADMIN — Medication 20 MILLIGRAM(S): at 06:27

## 2018-12-26 RX ADMIN — LIDOCAINE 1 PATCH: 4 CREAM TOPICAL at 23:47

## 2018-12-26 RX ADMIN — ONDANSETRON 4 MILLIGRAM(S): 8 TABLET, FILM COATED ORAL at 19:28

## 2018-12-26 RX ADMIN — OXYCODONE HYDROCHLORIDE 5 MILLIGRAM(S): 5 TABLET ORAL at 06:25

## 2018-12-26 RX ADMIN — LIDOCAINE 1 PATCH: 4 CREAM TOPICAL at 02:03

## 2018-12-26 RX ADMIN — LACTULOSE 15 GRAM(S): 10 SOLUTION ORAL at 23:47

## 2018-12-26 RX ADMIN — LIDOCAINE 1 PATCH: 4 CREAM TOPICAL at 12:00

## 2018-12-26 RX ADMIN — METHOCARBAMOL 750 MILLIGRAM(S): 500 TABLET, FILM COATED ORAL at 22:47

## 2018-12-26 RX ADMIN — Medication 20 MILLIGRAM(S): at 06:28

## 2018-12-26 RX ADMIN — PANTOPRAZOLE SODIUM 40 MILLIGRAM(S): 20 TABLET, DELAYED RELEASE ORAL at 06:27

## 2018-12-26 RX ADMIN — Medication 50 MICROGRAM(S): at 06:27

## 2018-12-26 NOTE — PHYSICAL THERAPY INITIAL EVALUATION ADULT - SPECIFY REASON(S)
Hold PT for OOB activity at this time per RN. Pt is waiting for the TLSO brace to deliver due Acute, mild L1 vertebral body compression fracture s/p fall. Hold PT for OOB activity at this time per RN. Pt is waiting for the TLSO brace to deliver due to Acute, mild L1 vertebral body compression fracture s/p fall. Hold PT for OOB activity at this time per RN secondary pt is waiting for the TLSO brace due to Acute, mild L1 vertebral body compression fracture s/p fall.

## 2018-12-26 NOTE — CONSULT NOTE ADULT - ASSESSMENT
IMPRESSION: Rehab of Debilitation fall L1 comp fx    PRECAUTIONS: [  x  ] Cardiac  [    ] Respiratory  [    ] Seizures [    ] Contact Isolation  [    ] Droplet Isolation  [    ] Other    Weight Bearing Status:     RECOMMENDATION: OOB WITH TLSO    Out of Bed to Chair     DVT/Decubiti Prophylaxis    REHAB PLAN:     [   x  ] Bedside P/T 3-5 times a week   [     ] Bedside O/T  2-3 times a week   [     ] No Rehab Therapy Indicated   [     ]  Speech Therapy   Conditioning/ROM                                 ADL  Bed Mobility                                            Conditioning/ROM  Transfers                                                  Bed Mobility  Sitting /Standing Balance                      Transfers                                        Gait Training                                            Sitting/Standing Balance  Stair Training [ x  ]Applicable                 Home equipment Eval                                                                     Splinting  [   ] Only      GOALS:   ADL   [ x   ]   Independent         Transfers  [   x Independent            Ambulation  [ x    ] Independent     [   x  ] With device                            [    ]  CG                                               [    ]  CG                                                    [     ] CG     EVALUATE FOR WALKER FOR DC                            [    ] Min A                                          [    ] Min A                                                [     ] Min  A          DISCHARGE PLAN:   [     ]  Good candidate for Intensive Rehabilitation/Hospital based                                             Will tolerate 3hrs Intensive Rehab Daily                                       [      ]  Short Term Rehab in Skilled Nursing Facility                                       [  x    ]  Home with Outpatient or VN services RW ON DC                                         [      ]  Possible Candidate for Intensive Hospital based Rehab

## 2018-12-26 NOTE — CONSULT NOTE ADULT - SUBJECTIVE AND OBJECTIVE BOX
Patient is a 58y old  Female who presents with a chief complaint of Syncope (26 Dec 2018 18:00)    HPI:  58 year old female with pertinent medical history of cirrhosis / Primary Biliary Cirrhosis dx 2003 ( positive AMA), chronic Left upper quadrant pain, liver cirrhosis with portal hypertension, ascites, splenomegaly and esophageal varices, thrombocytopenia awaiting liver transplant at [facility]  recently d/c for exacerbation of abdominal pain now presents s/p fall.     Pt's son translated for me. Per son, he heard his mom call for him around 6am this morning. He found her slouched on her side with some drooling around her mouth. He and his wife who works as a PCA helped her to the couch where she was simply staring at them for about 10-15 secs.She thereafter complained of back pain. Pt only recalls washing her hands and not the event of falling on the bathroom floor. Family checked her SBP @ home 105 and son states since her hospital d/c she has felt better and her ascites and LE edema had improved significantly. Son is unsure if the pt ever LOC, and pt denied hx of seizures, fecal/urinary incontinence, CP, palpitations, bleeding (24 Dec 2018 12:57)      PAST MEDICAL & SURGICAL HISTORY:  Splenomegaly  Hypotension  GERD (gastroesophageal reflux disease)  Liver cirrhosis  History of appendectomy  History of tonsillectomy      Hospital Course: Unwitnessed Fall with confusion likely 2/2 hypotension which is secondary to liver cirrhosis/hyperammonemia/ metabolic encephalopathy   - syncope unlikely since she was calling her son's name   - EEG with generalized slowing   - Bedside ECHO in ED negative for effusion, recent echo ( 12/19 )shows normal EF with severe pulm HTN and RT V systolic dysfunction)   - c/w telemetry monitoring  - c/w midodrine 10 mg TID   - start Lactulose ( titrate to 3-4 BMs a day), f/u ammonia level in 24 hours   - fall precautions  - check orthostatic VS  Acute, mild L1 vertebral body compression fracture s/p fall:  - neurosurgery is following   - start Robaxin 750 mg q 8 hrs   - c/w oxycodone ir 5 mg q 6 hrs PRN and lidocaine patch for pain control  - TLSO brace ordered and to will consider kyphoplasty if conservative treatment  fails   PBC complicated by Portal HTN, Esophageal Varices s/p band:   - Ammonia-74, pt is A&Ox 3, will f/u ammonia in the AM  - started lactulose 15 mg , q 6 hrs for target of 3-4 bowel movements / day  - c/w Lasix 20 mg po daily   - changed Propranolol to 10 mg PO daily with hold parameters from tomorrow hold SBP<90 , HR< 55  - Patient has INR 1.68 monitor   - Continue with Ursodiol for now, avoid Hepatoxic agents  - Pt was suppose to follow for transplant in [facility] on Jan 4, 2019    TODAY'S SUBJECTIVE & REVIEW OF SYMPTOMS:     Constitutional dizziness   Cardio WNL   Resp WNL   GI WNL  Heme WNL  Endo WNL  Skin WNL  MSK WNL  Neuro WNL  Cognitive WNL  Psych WNL      MEDICATIONS  (STANDING):  furosemide    Tablet 20 milliGRAM(s) Oral daily  lactulose Syrup 15 Gram(s) Oral four times a day  levothyroxine 50 MICROGram(s) Oral daily  lidocaine   Patch 1 Patch Transdermal daily  methocarbamol 750 milliGRAM(s) Oral three times a day  midodrine 10 milliGRAM(s) Oral three times a day  pantoprazole    Tablet 40 milliGRAM(s) Oral before breakfast  ursodiol Capsule 300 milliGRAM(s) Oral every 8 hours    MEDICATIONS  (PRN):  oxyCODONE    IR 5 milliGRAM(s) Oral four times a day after meals PRN Severe Pain (7 - 10)      FAMILY HISTORY:      Allergies    No Known Allergies    Intolerances        SOCIAL HISTORY:    [    ] Etoh  [    ] Smoking  [    ] Substance abuse     Home Environment:  [    ] Home Alone  [  x  ] Lives with Family  [    ] Home Health Aid    Dwelling:  [    ] Apartment  [  x  ] Private House  [    ] Adult Home  [    ] Skilled Nursing Facility      [    ] Short Term  [    ] Long Term  [ x   ] Stairs                           [    ] Elevator     FUNCTIONAL STATUS PTA: (Check all that apply)  Ambulation: [  x   ]Independent    [    ] Dependent     [    ] Non-Ambulatory  Assistive Device: [    ] SA Cane  [    ]  Q Cane  [    ] Walker  [    ]  Wheelchair  ADL : [ x   ] Independent  [    ]  Dependent       Vital Signs Last 24 Hrs  T(C): 36 (26 Dec 2018 06:16), Max: 36 (26 Dec 2018 06:16)  T(F): 96.8 (26 Dec 2018 06:16), Max: 96.8 (26 Dec 2018 06:16)  HR: 65 (26 Dec 2018 16:49) (57 - 65)  BP: 97/55 (26 Dec 2018 16:49) (72/48 - 97/55)  BP(mean): --  RR: 16 (26 Dec 2018 06:16) (16 - 16)  SpO2: 96% (25 Dec 2018 22:09) (96% - 96%)      PHYSICAL EXAM: Alert & Oriented X3 TLSO IN PLACE  GENERAL: NAD, well-groomed, well-developed  HEAD:  Atraumatic, Normocephalic  EYES: EOMI, PERRLA, conjunctiva and sclera clear  NECK: Supple, No JVD, Normal thyroid  CHEST/LUNG: Clear bilaterally; No rales, rhonchi, wheezing, or rubs  HEART: Regular rate and rhythm; No murmurs, rubs, or gallops  ABDOMEN: Soft, Nontender, Nondistended; Bowel sounds present  EXTREMITIES:  2+ Peripheral Pulses, No clubbing, cyanosis, or edema    NERVOUS SYSTEM:  Cranial Nerves 2-12 intact [  x  ] Abnormal  [    ]  ROM: WFL all extremities [ x   ]  Abnormal [     ]  Motor Strength: WFL all extremities  [ x   ]  Abnormal [    ]  Sensation: intact to light touch [  x  ] Abnormal [    ]  Reflexes: Symmetric [    ]  Abnormal [    ]    FUNCTIONAL STATUS:  Bed Mobility: [   ]  Independent [    ]  Supervision [ x   ]  Needs Assistance [  ]  N/A  Transfers: [    ]  Independent [    ]  Supervision [x    ]  Needs Assistance [    ]  N/A    Ambulation:  [    ]  Independent [    ]  Supervision [ x   ]  Needs Assistance [    ]  N/A   ADL:  [    ]   Independent [    ] Requires Assistance [ x   ] N/A   AMBULATED WITH SUPERVISION WITH SON IN ROOM    LABS:                        8.1    2.34  )-----------( 38       ( 25 Dec 2018 11:55 )             26.7     12-25    137  |  104  |  15  ----------------------------<  90  3.6   |  18  |  0.8    Ca    7.5<L>      25 Dec 2018 11:55  Mg     1.9     12-25    TPro  7.6  /  Alb  2.4<L>  /  TBili  1.3<H>  /  DBili  x   /  AST  37  /  ALT  13  /  AlkPhos  70  12-25    PT/INR - ( 25 Dec 2018 06:04 )   PT: 19.20 sec;   INR: 1.68 ratio               RADIOLOGY & ADDITIONAL STUDIES:

## 2018-12-26 NOTE — PROGRESS NOTE ADULT - ASSESSMENT
58 year old female with pertinent medical history of cirrhosis / Primary Biliary Cirrhosis dx 2003 ( positive AMA), chronic Left upper quadrant pain, liver cirrhosis with portal hypertension, ascites, splenomegaly and esophageal varices, thrombocytopenia awaiting liver transplant at [facility]  recently d/c for exacerbation of abdominal pain now presents s/p fall.     # Unwitnessed Fall with confusion likely 2/2 hypotension which is secondary to liver cirrhosis:   - syncope unlikely since she was calling her son's name   - EEG with generalized slowing   - Bedside ECHO in ED negative for effusion, recent echo ( 12/19 )shows normal EF with severe pulm HTN and RT V systolic dysfunction)   - c/w telemetry monitoring  - c/w midodrine 10 mg TID     # Acute, mild L1 vertebral body compression fracture s/p fall:  - neurosurgery is following   - start Robaxin 750 mg q 8 hrs   - c/w oxycodone ir 5 mg q 6 hrs PRN and lidocaine patch for pain control  - TLSO brace ordered and to will consider kyphoplasty if conservative ttt failed     #  PBC complicated by Portal HTN, Esophageal Varices s/p band:   - Ammonia-74, pt is A&Ox 3, will f/u ammonia in the AM  - started lactulose 15 mg , q 6 hrs for target of 3-4 bowel movements / day  - c/w Lasix 20 mg po daily   - changed Propranolol to 10 mg PO daily with hold parameters from tomorrow hold SBP<90 , HR< 55  - Patient has INR 1.68 monitor   - Continue with Ursodiol for now, avoid Hepatoxic agents  - Pt was suppose to follow for transplant in [facility] on Jan 4, 2019    # Pulmonary arterial hypertension likely complication of cirrhosis  - f/u o/p pulmonary    # Hypothyroidism:   - TSH 21.42 and low T4 3.3  - Per last notes, patient on levothyroxine 50 mc daily      # thrombocytopenia:   - stable  - Baseline platelet count around 50 k (12/16/18)    # Normocytic Anemia:   - stable      # GI PPx - Protonix    # DVT PPx - SCD  # Activity - PT rehab   # Code Status -   FULL 58 year old female with pertinent medical history of cirrhosis / Primary Biliary Cirrhosis dx 2003 ( positive AMA), chronic Left upper quadrant pain, liver cirrhosis with portal hypertension, ascites, splenomegaly and esophageal varices, thrombocytopenia awaiting liver transplant at [facility]  recently d/c for exacerbation of abdominal pain now presents s/p fall.     # Unwitnessed Fall with confusion likely 2/2 hypotension which is secondary to liver cirrhosis/hyperammonemia/ metabolic encephalopathy   - syncope unlikely since she was calling her son's name   - EEG with generalized slowing   - Bedside ECHO in ED negative for effusion, recent echo ( 12/19 )shows normal EF with severe pulm HTN and RT V systolic dysfunction)   - c/w telemetry monitoring  - c/w midodrine 10 mg TID   - start Lactulose ( titrate to 3-4 BMs a day), f/u ammonia level in 24 hours   - fall precautions  - check orthostatic VS  - PT/Rehab     # Acute, mild L1 vertebral body compression fracture s/p fall:  - neurosurgery is following   - start Robaxin 750 mg q 8 hrs   - c/w oxycodone ir 5 mg q 6 hrs PRN and lidocaine patch for pain control  - TLSO brace ordered and to will consider kyphoplasty if conservative treatment  fails     #  PBC complicated by Portal HTN, Esophageal Varices s/p band:   - Ammonia-74, pt is A&Ox 3, will f/u ammonia in the AM  - started lactulose 15 mg , q 6 hrs for target of 3-4 bowel movements / day  - c/w Lasix 20 mg po daily   - changed Propranolol to 10 mg PO daily with hold parameters from tomorrow hold SBP<90 , HR< 55  - Patient has INR 1.68 monitor   - Continue with Ursodiol for now, avoid Hepatoxic agents  - Pt was suppose to follow for transplant in [facility] on Jan 4, 2019    # Pulmonary arterial hypertension likely complication of cirrhosis  - f/u o/p pulmonary  - fluid restriction 1200 ml in 24 hours  - daily weight, intake and output monitoring  - on po Lasix     # Hypothyroidism:   - TSH 21.42 and low T4 3.3  - Per last notes, patient on levothyroxine 50 mc daily      # thrombocytopenia:   - stable  - Baseline platelet count around 50 k (12/16/18)    # Normocytic Anemia:   - stable      # GI PPx - Protonix    # DVT PPx - SCD  # Activity - PT rehab   # Code Status -   FULL   # prognosis guarded

## 2018-12-26 NOTE — PROGRESS NOTE ADULT - ATTENDING COMMENTS
Pt was seen and examined at bedside independently, pt is c/o generalized weakness, sleepy today, she is also c/o lower back pain after fall, was found to have acute vertebral Fx.  I agree with residents findings, assessment and plan which was corrected by me.  Today TLSO brace ordered, pt was started on muscle relaxants, pain meds PRN, consulted by neurosurgery, will keep on bedrest until brace gets delivered.  Pt has advance liver cirrhosis ( primary billary cirrhosis), she is on transplant list at [facility] with borderline BP, dose of BB decreased today, will c/w Lasix with parameters for blood pressure.   For chronic diastolic CHF and PHTN : fluid restriction 1200 ml in 24 hours, daily weight, intake and output monitoring, po Lasix, monitor for fluid overload.  Confusion resolving ( could be metabolic encephalopathy due to hyperammonemia), started on Lactulose, f/u ammonia level in 24 hours.  After TLSO brace delivery will put pt OOB to chair and call PT/Rehab. Pt was seen and examined at bedside independently, pt is c/o generalized weakness, sleepy today, she is also c/o lower back pain after fall, was found to have acute vertebral Fx.  I agree with residents findings, assessment and plan which was corrected by me.  Today TLSO brace ordered, pt was started on muscle relaxants, pain meds PRN, consulted by neurosurgery, will keep on bedrest until brace gets delivered.  Pt has advance liver cirrhosis ( primary billary cirrhosis), she is on transplant list at [facility] with borderline BP, dose of BB decreased today, will c/w Lasix with parameters for blood pressure.   For chronic diastolic CHF and PHTN : fluid restriction 1200 ml in 24 hours, daily weight, intake and output monitoring, po Lasix, monitor for fluid overload.  Confusion  is resolving ( could be metabolic encephalopathy due to hyperammonemia), started on Lactulose, f/u ammonia level in 24 hours.  After TLSO brace delivery will put pt OOB to chair and call PT/Rehab.

## 2018-12-27 ENCOUNTER — TRANSCRIPTION ENCOUNTER (OUTPATIENT)
Age: 58
End: 2018-12-27

## 2018-12-27 DIAGNOSIS — K74.3 PRIMARY BILIARY CIRRHOSIS: ICD-10-CM

## 2018-12-27 DIAGNOSIS — R07.9 CHEST PAIN, UNSPECIFIED: ICD-10-CM

## 2018-12-27 DIAGNOSIS — K74.69 OTHER CIRRHOSIS OF LIVER: ICD-10-CM

## 2018-12-27 DIAGNOSIS — E03.9 HYPOTHYROIDISM, UNSPECIFIED: ICD-10-CM

## 2018-12-27 DIAGNOSIS — I85.10 SECONDARY ESOPHAGEAL VARICES WITHOUT BLEEDING: ICD-10-CM

## 2018-12-27 DIAGNOSIS — J44.9 CHRONIC OBSTRUCTIVE PULMONARY DISEASE, UNSPECIFIED: ICD-10-CM

## 2018-12-27 DIAGNOSIS — Z71.6 TOBACCO ABUSE COUNSELING: ICD-10-CM

## 2018-12-27 DIAGNOSIS — K52.9 NONINFECTIVE GASTROENTERITIS AND COLITIS, UNSPECIFIED: ICD-10-CM

## 2018-12-27 DIAGNOSIS — R16.1 SPLENOMEGALY, NOT ELSEWHERE CLASSIFIED: ICD-10-CM

## 2018-12-27 DIAGNOSIS — D61.818 OTHER PANCYTOPENIA: ICD-10-CM

## 2018-12-27 DIAGNOSIS — I27.21 SECONDARY PULMONARY ARTERIAL HYPERTENSION: ICD-10-CM

## 2018-12-27 DIAGNOSIS — B18.2 CHRONIC VIRAL HEPATITIS C: ICD-10-CM

## 2018-12-27 DIAGNOSIS — K29.70 GASTRITIS, UNSPECIFIED, WITHOUT BLEEDING: ICD-10-CM

## 2018-12-27 DIAGNOSIS — K76.6 PORTAL HYPERTENSION: ICD-10-CM

## 2018-12-27 DIAGNOSIS — F17.210 NICOTINE DEPENDENCE, CIGARETTES, UNCOMPLICATED: ICD-10-CM

## 2018-12-27 DIAGNOSIS — R18.8 OTHER ASCITES: ICD-10-CM

## 2018-12-27 LAB
ALBUMIN SERPL ELPH-MCNC: 2.6 G/DL — LOW (ref 3.5–5.2)
AMMONIA BLD-MCNC: 67 UMOL/L — HIGH (ref 11–55)
ANION GAP SERPL CALC-SCNC: 14 MMOL/L — SIGNIFICANT CHANGE UP (ref 7–14)
BASOPHILS # BLD AUTO: 0.01 K/UL — SIGNIFICANT CHANGE UP (ref 0–0.2)
BASOPHILS NFR BLD AUTO: 0.2 % — SIGNIFICANT CHANGE UP (ref 0–1)
BUN SERPL-MCNC: 18 MG/DL — SIGNIFICANT CHANGE UP (ref 10–20)
CALCIUM SERPL-MCNC: 7.9 MG/DL — LOW (ref 8.5–10.1)
CHLORIDE SERPL-SCNC: 104 MMOL/L — SIGNIFICANT CHANGE UP (ref 98–110)
CO2 SERPL-SCNC: 18 MMOL/L — SIGNIFICANT CHANGE UP (ref 17–32)
CREAT SERPL-MCNC: 0.8 MG/DL — SIGNIFICANT CHANGE UP (ref 0.7–1.5)
EOSINOPHIL # BLD AUTO: 0.04 K/UL — SIGNIFICANT CHANGE UP (ref 0–0.7)
EOSINOPHIL NFR BLD AUTO: 0.9 % — SIGNIFICANT CHANGE UP (ref 0–8)
GLUCOSE SERPL-MCNC: 96 MG/DL — SIGNIFICANT CHANGE UP (ref 70–99)
HCT VFR BLD CALC: 32.8 % — LOW (ref 37–47)
HGB BLD-MCNC: 10 G/DL — LOW (ref 12–16)
IMM GRANULOCYTES NFR BLD AUTO: 0.5 % — HIGH (ref 0.1–0.3)
LYMPHOCYTES # BLD AUTO: 1.02 K/UL — LOW (ref 1.2–3.4)
LYMPHOCYTES # BLD AUTO: 23.9 % — SIGNIFICANT CHANGE UP (ref 20.5–51.1)
MCHC RBC-ENTMCNC: 26.7 PG — LOW (ref 27–31)
MCHC RBC-ENTMCNC: 30.5 G/DL — LOW (ref 32–37)
MCV RBC AUTO: 87.5 FL — SIGNIFICANT CHANGE UP (ref 81–99)
MONOCYTES # BLD AUTO: 0.29 K/UL — SIGNIFICANT CHANGE UP (ref 0.1–0.6)
MONOCYTES NFR BLD AUTO: 6.8 % — SIGNIFICANT CHANGE UP (ref 1.7–9.3)
NEUTROPHILS # BLD AUTO: 2.88 K/UL — SIGNIFICANT CHANGE UP (ref 1.4–6.5)
NEUTROPHILS NFR BLD AUTO: 67.7 % — SIGNIFICANT CHANGE UP (ref 42.2–75.2)
NRBC # BLD: 0 /100 WBCS — SIGNIFICANT CHANGE UP (ref 0–0)
PLATELET # BLD AUTO: 55 K/UL — LOW (ref 130–400)
POTASSIUM SERPL-MCNC: 3.8 MMOL/L — SIGNIFICANT CHANGE UP (ref 3.5–5)
POTASSIUM SERPL-SCNC: 3.8 MMOL/L — SIGNIFICANT CHANGE UP (ref 3.5–5)
RBC # BLD: 3.75 M/UL — LOW (ref 4.2–5.4)
RBC # FLD: 20.2 % — HIGH (ref 11.5–14.5)
SODIUM SERPL-SCNC: 136 MMOL/L — SIGNIFICANT CHANGE UP (ref 135–146)
WBC # BLD: 4.26 K/UL — LOW (ref 4.8–10.8)
WBC # FLD AUTO: 4.26 K/UL — LOW (ref 4.8–10.8)

## 2018-12-27 RX ADMIN — MIDODRINE HYDROCHLORIDE 10 MILLIGRAM(S): 2.5 TABLET ORAL at 14:23

## 2018-12-27 RX ADMIN — Medication 10 MILLIGRAM(S): at 06:19

## 2018-12-27 RX ADMIN — MIDODRINE HYDROCHLORIDE 10 MILLIGRAM(S): 2.5 TABLET ORAL at 22:17

## 2018-12-27 RX ADMIN — URSODIOL 300 MILLIGRAM(S): 250 TABLET, FILM COATED ORAL at 06:19

## 2018-12-27 RX ADMIN — Medication 20 MILLIGRAM(S): at 06:18

## 2018-12-27 RX ADMIN — Medication 50 MICROGRAM(S): at 06:19

## 2018-12-27 RX ADMIN — PANTOPRAZOLE SODIUM 40 MILLIGRAM(S): 20 TABLET, DELAYED RELEASE ORAL at 06:20

## 2018-12-27 RX ADMIN — LIDOCAINE 1 PATCH: 4 CREAM TOPICAL at 12:42

## 2018-12-27 RX ADMIN — URSODIOL 300 MILLIGRAM(S): 250 TABLET, FILM COATED ORAL at 14:23

## 2018-12-27 RX ADMIN — MIDODRINE HYDROCHLORIDE 10 MILLIGRAM(S): 2.5 TABLET ORAL at 06:19

## 2018-12-27 RX ADMIN — URSODIOL 300 MILLIGRAM(S): 250 TABLET, FILM COATED ORAL at 22:17

## 2018-12-27 RX ADMIN — METHOCARBAMOL 750 MILLIGRAM(S): 500 TABLET, FILM COATED ORAL at 06:19

## 2018-12-27 RX ADMIN — LACTULOSE 15 GRAM(S): 10 SOLUTION ORAL at 06:20

## 2018-12-27 NOTE — DISCHARGE NOTE ADULT - HOSPITAL COURSE
58 year old female with pertinent medical history of cirrhosis / Primary Biliary Cirrhosis dx 2003 ( positive AMA), chronic Left upper quadrant pain, liver cirrhosis with portal hypertension, ascites, splenomegaly and esophageal varices, thrombocytopenia awaiting liver transplant at [facility]  recently d/c for exacerbation of abdominal pain now presents s/p fall.     Pt's son translated for me. Per son, he heard his mom call for him around 6am this morning. He found her slouched on her side with some drooling around her mouth. He and his wife who works as a PCA helped her to the couch where she was simply staring at them for about 10-15 secs.She thereafter complained of back pain. Pt only recalls washing her hands and not the event of falling on the bathroom floor. Family checked her SBP @ home 105 and son states since her hospital d/c she has felt better and her ascites and LE edema had improved significantly. Son is unsure if the pt ever LOC, and pt denied hx of seizures, fecal/urinary incontinence, CP, palpitations, bleeding     work up revealed acute mild compression fracture of L1 Vertebral Body , Neurosurgery consulted , recommended TLSO Brace , PT and pain control for now and out patient follow up for kyphoplasty if conservative ttt fails. clinical condition has been stabilized. pt cleared to be discharged by Medicine attending to follow up with Dr. Davis Neurosurgery as out patient and [facility] for liver transplant ( pt has appointment )

## 2018-12-27 NOTE — DISCHARGE NOTE ADULT - CARE PROVIDER_API CALL
Odette Davis), Neurological Surgery  [address]  [address]  [place] [address]  Phone: [phone]  Fax: [phone]

## 2018-12-27 NOTE — DISCHARGE NOTE ADULT - PATIENT PORTAL LINK FT
You can access the [facility] Patient Portal, offered by [facility], by registering with the following website: [email or web address]

## 2018-12-27 NOTE — DISCHARGE NOTE ADULT - MEDICATION SUMMARY - MEDICATIONS TO TAKE
I will START or STAY ON the medications listed below when I get home from the hospital:    oxyCODONE 5 mg oral tablet  -- 1 tab(s) by mouth 1 to 4 times a day, As Needed -Severe Pain (7 - 10) MDD:4   -- Indication: For Pain control    lidocaine 5% topical film  -- Apply on skin to affected area once a day MDD:apply for 12 hours on and 12 hours off  -- Indication: For pain control    furosemide 20 mg oral tablet  -- 1 tab(s) by mouth once a day at 12PM  -- Indication: For Liver cirrhosis    ursodiol 300 mg oral tablet  -- 1 tab(s) by mouth 3 times a day  -- Indication: For Liver cirrhosis    lactulose 10 g/15 mL oral syrup  -- 5 milliliter(s) by mouth 3 times a day MDD:titrate till 2-3 bowel movements / day  -- Indication: For Liver cirrhosis    midodrine 10 mg oral tablet  -- 1 tab(s) by mouth 3 times a day  -- Indication: For Hypotension    pantoprazole 40 mg oral delayed release tablet  -- 1 tab(s) by mouth once a day (before a meal)  -- Indication: For Gi ppx    levothyroxine 50 mcg (0.05 mg) oral tablet  -- 1 tab(s) by mouth once a day  -- Indication: For Hypothyroidism

## 2018-12-27 NOTE — PROGRESS NOTE ADULT - ASSESSMENT
58 year old female with pertinent medical history of cirrhosis / Primary Biliary Cirrhosis dx 2003 ( positive AMA), chronic Left upper quadrant pain, liver cirrhosis with portal hypertension, ascites, splenomegaly and esophageal varices, thrombocytopenia awaiting liver transplant at [facility]  recently d/c for exacerbation of abdominal pain now presents s/p fall.     # Unwitnessed Fall with confusion likely 2/2 hypotension which is secondary to liver cirrhosis/hyperammonemia/ metabolic encephalopathy:   - EEG with generalized slowing   - Bedside ECHO in ED negative for effusion, recent echo ( 12/19 )shows normal EF with severe pulm HTN and RT V systolic dysfunction)   - c/w telemetry monitoring  - c/w midodrine 10 mg TID   - c/w Lactulose ( titrate to 3-4 BMs a day), f/u ammonia level in 24 hours   - fall precautions  - PT/Rehab     # Acute, mild L1 vertebral body compression fracture s/p fall:  - neurosurgery is following   - c/w Robaxin 750 mg q 8 hrs   - c/w oxycodone ir 5 mg q 6 hrs PRN and lidocaine patch for pain control  - TLSO brace ordered and to will consider kyphoplasty if conservative treatment  fails     #  PBC complicated by Portal HTN, Esophageal Varices s/p band:   - Ammonia-74, pt is A&Ox 3, will f/u ammonia in the AM  - started lactulose 15 mg , q 6 hrs for target of 3-4 bowel movements / day  - c/w Lasix 20 mg po daily   - changed Propranolol to 10 mg PO daily with hold parameters from tomorrow hold SBP<90 , HR< 55  - Patient has INR 1.68 monitor   - Continue with Ursodiol for now, avoid Hepatoxic agents  - Pt was suppose to follow for transplant in [facility] on Jan 4, 2019    # Pulmonary arterial hypertension likely complication of cirrhosis  - f/u o/p pulmonary  - fluid restriction 1200 ml in 24 hours  - daily weight, intake and output monitoring  - on po Lasix     # Hypothyroidism:   - TSH 21.42 and low T4 3.3  - Per last notes, patient on levothyroxine 50 mc daily      # thrombocytopenia:   - stable  - Baseline platelet count around 50 k (12/16/18)    # Normocytic Anemia:   - stable      # GI PPx - Protonix    # DVT PPx - SCD  # Activity - PT rehab   # Code Status -   FULL   # prognosis guarded 58 year old female with pertinent medical history of cirrhosis / Primary Biliary Cirrhosis dx 2003 ( positive AMA), chronic Left upper quadrant pain, liver cirrhosis with portal hypertension, ascites, splenomegaly and esophageal varices, thrombocytopenia awaiting liver transplant at [facility]  recently d/c for exacerbation of abdominal pain now presents s/p fall.     # Unwitnessed Fall with confusion likely 2/2 hypotension which is secondary to liver cirrhosis/hyperammonemia/ metabolic encephalopathy:   - EEG with generalized slowing   - Bedside ECHO in ED negative for effusion, recent echo ( 12/19 )shows normal EF with severe pulm HTN and RT V systolic dysfunction)   - c/w telemetry monitoring  - c/w midodrine 10 mg TID    - fall precautions  - PT/Rehab     # Acute, mild L1 vertebral body compression fracture s/p fall:  - neurosurgery is following   - c/w Robaxin 750 mg q 8 hrs   - c/w oxycodone ir 5 mg q 6 hrs PRN and lidocaine patch for pain control  - TLSO brace placed ,  kyphoplasty if conservative treatment  fails     #  PBC complicated by Portal HTN, Esophageal Varices s/p band:   - Ammonia: 67 from 74 yesterday,  pt is A&Ox 3   - c/w lactulose 15 mg , q 6 hrs for target of 3-4 bowel movements / day  - c/w Lasix 20 mg po daily and , Propranolol  10 mg PO daily with hold parameters from tomorrow hold SBP<90 , HR< 55  - Patient has INR 1.68 monitor   - Continue with Ursodiol for now, avoid Hepatoxic agents  - Pt was suppose to follow for transplant in [facility] on Jan 4, 2019    # Pulmonary arterial hypertension likely complication of cirrhosis:   - f/u o/p pulmonary  - fluid restriction 1200 ml in 24 hours  - daily weight, intake and output monitoring  - on po Lasix     # Hypothyroidism:   - TSH 21.42 and low T4 3.3  - Per last notes, patient on levothyroxine 50 mc daily      # thrombocytopenia:   - stable  - Baseline platelet count around 50 k (12/16/18)    # Normocytic Anemia:   - stable      # GI PPx - Protonix    # DVT PPx - SCD  # Activity - PT rehab   # Code Status -   FULL   # Dispo: anticipated home tomorrow

## 2018-12-27 NOTE — PHYSICAL THERAPY INITIAL EVALUATION ADULT - GENERAL OBSERVATIONS, REHAB EVAL
Pt encountered in the bed, [place] speaking, + sister in the room assisted PT with interprete, agreeable for b/s PT, +TLSO. Pt left in sitting position post tx all needs with in reach.

## 2018-12-27 NOTE — DISCHARGE NOTE ADULT - CARE PLAN
Principal Discharge DX:	Compression fracture of lumbar vertebra  Goal:	medically optimize the condition  Assessment and plan of treatment:	you have been diagnosed with acute mild compression fracture of L1 Vertebral Body , you have been conservatively treated with Brace , PT and pain control.  1- take medications as prescribed  2- follow up with Dr. Davis as out patient in 1-2 weeks  Secondary Diagnosis:	Liver cirrhosis  Assessment and plan of treatment:	1- take medications as prescribed with the instructions.  2- follow up with appointment at [facility] regarding Liver transplant list enrollment

## 2018-12-27 NOTE — PROGRESS NOTE ADULT - ASSESSMENT
58 year old female with pertinent medical history of cirrhosis / Primary Biliary Cirrhosis dx 2003 ( positive AMA), chronic Left upper quadrant pain, liver cirrhosis with portal hypertension, ascites, splenomegaly and esophageal varices, thrombocytopenia awaiting liver transplant at [facility]  recently d/c for exacerbation of abdominal pain now presents s/p fall.     1.	Fall likely due to orthostatic hypotension  2.	Ch. Hypotension  3.	Primary biliary cirrhosis  4.	Thrombocytopenia  5.	Splenomegaly  6.	Ac. L1 compression Fx.          PLAN:    ·	No events on tele  ·	Pain control  ·	Neurosurgery eval noted. Recommended TLS brace  ·	Pt has appointment at [facility] to be evaluated for transplant  ·	D/C methocarbamol. Cont her other meds  ·	Care D/W the pt's son on bedside  ·	Poor prognosis    * Med rec reviewed with the intern. Plan of care D/W the pt's son. Time spent 40 minutes.

## 2018-12-28 VITALS — TEMPERATURE: 98 F | WEIGHT: 143.3 LBS

## 2018-12-28 LAB
ANION GAP SERPL CALC-SCNC: 14 MMOL/L — SIGNIFICANT CHANGE UP (ref 7–14)
BUN SERPL-MCNC: 17 MG/DL — SIGNIFICANT CHANGE UP (ref 10–20)
CALCIUM SERPL-MCNC: 7.7 MG/DL — LOW (ref 8.5–10.1)
CHLORIDE SERPL-SCNC: 103 MMOL/L — SIGNIFICANT CHANGE UP (ref 98–110)
CO2 SERPL-SCNC: 21 MMOL/L — SIGNIFICANT CHANGE UP (ref 17–32)
CREAT SERPL-MCNC: 0.8 MG/DL — SIGNIFICANT CHANGE UP (ref 0.7–1.5)
GLUCOSE SERPL-MCNC: 86 MG/DL — SIGNIFICANT CHANGE UP (ref 70–99)
HCT VFR BLD CALC: 29.2 % — LOW (ref 37–47)
HGB BLD-MCNC: 8.9 G/DL — LOW (ref 12–16)
MCHC RBC-ENTMCNC: 26.6 PG — LOW (ref 27–31)
MCHC RBC-ENTMCNC: 30.5 G/DL — LOW (ref 32–37)
MCV RBC AUTO: 87.2 FL — SIGNIFICANT CHANGE UP (ref 81–99)
NRBC # BLD: 0 /100 WBCS — SIGNIFICANT CHANGE UP (ref 0–0)
PLATELET # BLD AUTO: 45 K/UL — LOW (ref 130–400)
POTASSIUM SERPL-MCNC: 3.9 MMOL/L — SIGNIFICANT CHANGE UP (ref 3.5–5)
POTASSIUM SERPL-SCNC: 3.9 MMOL/L — SIGNIFICANT CHANGE UP (ref 3.5–5)
RBC # BLD: 3.35 M/UL — LOW (ref 4.2–5.4)
RBC # FLD: 19.9 % — HIGH (ref 11.5–14.5)
SODIUM SERPL-SCNC: 138 MMOL/L — SIGNIFICANT CHANGE UP (ref 135–146)
WBC # BLD: 2.97 K/UL — LOW (ref 4.8–10.8)
WBC # FLD AUTO: 2.97 K/UL — LOW (ref 4.8–10.8)

## 2018-12-28 RX ORDER — MIDODRINE HYDROCHLORIDE 2.5 MG/1
1 TABLET ORAL
Qty: 0 | Refills: 0 | COMMUNITY
Start: 2018-12-28

## 2018-12-28 RX ORDER — LIDOCAINE 4 G/100G
1 CREAM TOPICAL
Qty: 10 | Refills: 0 | OUTPATIENT
Start: 2018-12-28 | End: 2019-01-06

## 2018-12-28 RX ORDER — OXYCODONE HYDROCHLORIDE 5 MG/1
1 TABLET ORAL
Qty: 0 | Refills: 0 | COMMUNITY
Start: 2018-12-28

## 2018-12-28 RX ORDER — OXYCODONE HYDROCHLORIDE 5 MG/1
1 TABLET ORAL
Qty: 16 | Refills: 0 | OUTPATIENT
Start: 2018-12-28 | End: 2018-12-31

## 2018-12-28 RX ORDER — LACTULOSE 10 G/15ML
5 SOLUTION ORAL
Qty: 45 | Refills: 0 | OUTPATIENT
Start: 2018-12-28 | End: 2019-01-26

## 2018-12-28 RX ORDER — PROPRANOLOL HCL 160 MG
1 CAPSULE, EXTENDED RELEASE 24HR ORAL
Qty: 0 | Refills: 0 | COMMUNITY

## 2018-12-28 RX ADMIN — LACTULOSE 15 GRAM(S): 10 SOLUTION ORAL at 11:36

## 2018-12-28 RX ADMIN — Medication 20 MILLIGRAM(S): at 06:05

## 2018-12-28 RX ADMIN — MIDODRINE HYDROCHLORIDE 10 MILLIGRAM(S): 2.5 TABLET ORAL at 13:12

## 2018-12-28 RX ADMIN — LIDOCAINE 1 PATCH: 4 CREAM TOPICAL at 11:34

## 2018-12-28 RX ADMIN — URSODIOL 300 MILLIGRAM(S): 250 TABLET, FILM COATED ORAL at 06:05

## 2018-12-28 RX ADMIN — Medication 50 MICROGRAM(S): at 06:05

## 2018-12-28 RX ADMIN — PANTOPRAZOLE SODIUM 40 MILLIGRAM(S): 20 TABLET, DELAYED RELEASE ORAL at 06:05

## 2018-12-28 RX ADMIN — MIDODRINE HYDROCHLORIDE 10 MILLIGRAM(S): 2.5 TABLET ORAL at 06:05

## 2018-12-28 RX ADMIN — URSODIOL 300 MILLIGRAM(S): 250 TABLET, FILM COATED ORAL at 13:12

## 2018-12-28 NOTE — CHART NOTE - NSCHARTNOTEFT_GEN_A_CORE
<<<RESIDENT DISCHARGE NOTE>>>     TOO EPSTEIN  MRN-[id number]    VITAL SIGNS:  T(F): 98 (12-28-18 @ 05:20), Max: 98 (12-28-18 @ 05:20)  HR: 58 (12-27-18 @ 20:41)  BP: 80/54 (12-27-18 @ 20:41)  SpO2: --      PHYSICAL EXAMINATION:  GEN: No acute distress  LUNGS: Clear to auscultation bilaterally   HEART: S1/S2 present. RRR.   ABD: Soft, non-tender,  minimally distended. Bowel sounds present  EXT: NC/NC/NE/2+PP   NEURO: AAOX3    TEST RESULTS:                        8.9    2.97  )-----------( 45       ( 28 Dec 2018 07:05 )             29.2       12-28    138  |  103  |  17  ----------------------------<  86  3.9   |  21  |  0.8    Ca    7.7<L>      28 Dec 2018 07:05    TPro  x   /  Alb  2.6<L>  /  TBili  x   /  DBili  x   /  AST  x   /  ALT  x   /  AlkPhos  x   12-27      FINAL DISCHARGE INTERVIEW:  Resident(s) Present: (Name: Dr. Estephania Abdi), RN Present: (Name: Lucía )    DISCHARGE MEDICATION RECONCILIATION  reviewed with Attending (Name: Dr. Raines)    DISPOSITION:  Home with Visiting Nursing Services <<<RESIDENT DISCHARGE NOTE>>>     TOO EPSTEIN  MRN-[id number]    VITAL SIGNS:  T(F): 98 (12-28-18 @ 05:20), Max: 98 (12-28-18 @ 05:20)  HR: 58 (12-27-18 @ 20:41)  BP: 80/54 (12-27-18 @ 20:41)  SpO2: --      PHYSICAL EXAMINATION:  GEN: No acute distress  LUNGS: Clear to auscultation bilaterally   HEART: S1/S2 present. RRR.   ABD: Soft, non-tender,  minimally distended. Bowel sounds present  EXT: NC/NC/NE/2+PP   NEURO: AAOX3    TEST RESULTS:                        8.9    2.97  )-----------( 45       ( 28 Dec 2018 07:05 )             29.2       12-28    138  |  103  |  17  ----------------------------<  86  3.9   |  21  |  0.8    Ca    7.7<L>      28 Dec 2018 07:05    TPro  x   /  Alb  2.6<L>  /  TBili  x   /  DBili  x   /  AST  x   /  ALT  x   /  AlkPhos  x   12-27      FINAL DISCHARGE INTERVIEW:  Resident(s) Present: (Name: Dr. Estephania Abdi), RN Present: (Name: Sebastien )    DISCHARGE MEDICATION RECONCILIATION  reviewed with Attending (Name: Dr. Raines)    DISPOSITION:  Home with Visiting Nursing Services

## 2018-12-28 NOTE — PROGRESS NOTE ADULT - SUBJECTIVE AND OBJECTIVE BOX
TOO EPSTEIN  58y Female    CHIEF COMPLAINT:    Patient is a 58y old  Female who presents with a chief complaint of Syncope (27 Dec 2018 10:50)      INTERVAL HPI/OVERNIGHT EVENTS:    Patient seen and examined. No new complaint. Able to walk with assistance. C/O some backache on walking. No sob. No cp    ROS: All other systems are negative.    Vital Signs:    T(F): 97.1 (12-27-18 @ 05:00), Max: 97.1 (12-27-18 @ 05:00)  HR: 59 (12-27-18 @ 05:00) (53 - 65)  BP: 92/55 (12-27-18 @ 05:00) (72/48 - 97/55)  RR: 18 (12-27-18 @ 05:00) (18 - 18)  SpO2: 95% (12-26-18 @ 22:00) (95% - 95%)  I&O's Summary    Daily     Daily   CAPILLARY BLOOD GLUCOSE          PHYSICAL EXAM:    GENERAL:  NAD  SKIN: No rashes or lesions  HENT: Atrumatic. Normocephalic. PERRL. Moist membranes.  NECK: Supple, No JVD. No lymphadenopathy.  PULMONARY: CTA B/L. No wheezing. No rales  CVS: Normal S1, S2. Rate and Rythm are regular. No murmurs.  ABDOMEN/GI: Soft, Nontender, distended; BS present  EXTREMITIES: Peripheral pulses intact. No edema B/L LE.  NEUROLOGIC:  No motor or sensory deficit.  PSYCH: Alert & oriented x 3    Consultant(s) Notes Reviewed:  [x ] YES  [ ] NO  Care Discussed with Consultants/Other Providers [ x] YES  [ ] NO    EKG reviewed  Telemetry reviewed    LABS:                        10.0   4.26  )-----------( 55       ( 27 Dec 2018 06:28 )             32.8     12-27    136  |  104  |  18  ----------------------------<  96  3.8   |  18  |  0.8    Ca    7.9<L>      27 Dec 2018 06:28    TPro  x   /  Alb  2.6<L>  /  TBili  x   /  DBili  x   /  AST  x   /  ALT  x   /  AlkPhos  x   12-27              RADIOLOGY & ADDITIONAL TESTS:      Imaging or report Personally Reviewed:  [ ] YES  [ ] NO    Medications:  Standing  furosemide    Tablet 20 milliGRAM(s) Oral daily  lactulose Syrup 15 Gram(s) Oral four times a day  levothyroxine 50 MICROGram(s) Oral daily  lidocaine   Patch 1 Patch Transdermal daily  methocarbamol 750 milliGRAM(s) Oral three times a day  midodrine 10 milliGRAM(s) Oral three times a day  pantoprazole    Tablet 40 milliGRAM(s) Oral before breakfast  propranolol 10 milliGRAM(s) Oral daily  ursodiol Capsule 300 milliGRAM(s) Oral every 8 hours    PRN Meds  oxyCODONE    IR 5 milliGRAM(s) Oral four times a day after meals PRN      Case discussed with resident    Care discussed with pt/family
PT seen and examined. Pt to wear TLSO brace for comfort only. No Neurosurgical intervention as this time. If pain persists and pt is unable to tolerate, can consider Kyphoplasty. Can F/U with Dr. Davis as o/p
SUBJECTIVE:    Patient is a 58y old Female who presents with a chief complaint of Syncope (25 Dec 2018 10:22)  Currently admitted to medicine with the primary diagnosis of unwitnessed mechanical fall  Today is hospital day 2d. This morning she is resting comfortably in bed.  this am pt is endorsing pain is controlled , son at the bedside , asking about the pt feels sleepy most of the day.     PAST MEDICAL & SURGICAL HISTORY  Splenomegaly  Hypotension  GERD (gastroesophageal reflux disease)  Liver cirrhosis  History of appendectomy  History of tonsillectomy    SOCIAL HISTORY:  Negative for smoking/alcohol/drug use.     ALLERGIES:  No Known Allergies    MEDICATIONS:  STANDING MEDICATIONS  furosemide    Tablet 20 milliGRAM(s) Oral daily  lactulose Syrup 15 Gram(s) Oral four times a day  levothyroxine 50 MICROGram(s) Oral daily  lidocaine   Patch 1 Patch Transdermal daily  methocarbamol 750 milliGRAM(s) Oral three times a day  midodrine 10 milliGRAM(s) Oral three times a day  pantoprazole    Tablet 40 milliGRAM(s) Oral before breakfast  propranolol 10 milliGRAM(s) Oral daily  ursodiol Capsule 300 milliGRAM(s) Oral every 8 hours    PRN MEDICATIONS  oxyCODONE    IR 5 milliGRAM(s) Oral four times a day after meals PRN    VITALS:   T(F): 96.8  HR: 61  BP: 94/53  RR: 16  SpO2: 96%    LABS:                        8.1    2.34  )-----------( 38       ( 25 Dec 2018 11:55 )             26.7     12-25    137  |  104  |  15  ----------------------------<  90  3.6   |  18  |  0.8    Ca    7.5<L>      25 Dec 2018 11:55  Mg     1.9     12-25    TPro  7.6  /  Alb  2.4<L>  /  TBili  1.3<H>  /  DBili  x   /  AST  37  /  ALT  13  /  AlkPhos  70  12-25    PT/INR - ( 25 Dec 2018 06:04 )   PT: 19.20 sec;   INR: 1.68 ratio        RADIOLOGY:    < from: EEG (12.25.18 @ 14:30) >  Impression  Abnormal due to the presence of: generalized slowing as above    Clinical Correlation & Recommendations   Consistent with diffuse cerebralelectrophysiological dysfunction   secondary to nonspecific cause.    < end of copied text >      < from: CT Abdomen and Pelvis w/ IV Cont (12.24.18 @ 08:19) >    IMPRESSION:   1. Acute, mild L1 vertebral body compression fracture, without   significant osseous retropulsion.  2. Liver cirrhosis with portal hypertension.  3. Dilated main pulmonary artery, 3.6 cm suggestive of pulmonary arterial   hypertension.      < end of copied text >      PHYSICAL EXAM:  GEN: No acute distress  LUNGS: Clear to auscultation bilaterally   HEART: S1/S2 present. RRR.   ABD: Soft, non-tender,  minimally distended. Bowel sounds present  EXT: NC/NC/NE/2+PP   NEURO: AAOX3
SUBJECTIVE:    Patient is a 58y old Female who presents with a chief complaint of Syncope (27 Dec 2018 10:50)  Currently admitted to medicine with the primary diagnosis of Syncope with Acute L1 vertebral body compression fracture.  Today is hospital day 3d. This morning she is resting comfortably in bed.  this AM son at bed side , pt complain of pain on ambulation.    PAST MEDICAL & SURGICAL HISTORY  Splenomegaly  Hypotension  GERD (gastroesophageal reflux disease)  Liver cirrhosis  History of appendectomy  History of tonsillectomy    SOCIAL HISTORY:  Negative for smoking/alcohol/drug use.     ALLERGIES:  No Known Allergies    MEDICATIONS:  STANDING MEDICATIONS  furosemide    Tablet 20 milliGRAM(s) Oral daily  lactulose Syrup 15 Gram(s) Oral four times a day  levothyroxine 50 MICROGram(s) Oral daily  lidocaine   Patch 1 Patch Transdermal daily  methocarbamol 750 milliGRAM(s) Oral three times a day  midodrine 10 milliGRAM(s) Oral three times a day  pantoprazole    Tablet 40 milliGRAM(s) Oral before breakfast  propranolol 10 milliGRAM(s) Oral daily  ursodiol Capsule 300 milliGRAM(s) Oral every 8 hours    PRN MEDICATIONS  oxyCODONE    IR 5 milliGRAM(s) Oral four times a day after meals PRN    VITALS:   T(F): 97.1  HR: 59  BP: 92/55  RR: 18  SpO2: 95%    LABS:                        10.0   4.26  )-----------( 55       ( 27 Dec 2018 06:28 )             32.8     12-27    136  |  104  |  18  ----------------------------<  96  3.8   |  18  |  0.8    Ca    7.9<L>      27 Dec 2018 06:28    TPro  x   /  Alb  2.6<L>  /  TBili  x   /  DBili  x   /  AST  x   /  ALT  x   /  AlkPhos  x   12-27      RADIOLOGY:    < from: CT Abdomen and Pelvis w/ IV Cont (12.24.18 @ 08:19) >  IMPRESSION:   1. Acute, mild L1 vertebral body compression fracture, without   significant osseous retropulsion.  2. Liver cirrhosis with portal hypertension.  3. Dilated main pulmonary artery, 3.6 cm suggestive of pulmonary arterial   hypertension.    < end of copied text >      PHYSICAL EXAM:  GEN: No acute distress  LUNGS: Clear to auscultation bilaterally   HEART: S1/S2 present. RRR.   ABD: Soft, non-tender,  minimally distended. Bowel sounds present  EXT: NC/NC/NE/2+PP   NEURO: AAOX3
Subjective: persistent back pain, unable to ambulate    T(C): 36.6 (12-25-18 @ 05:46), Max: 36.8 (12-24-18 @ 16:08)  HR: 55 (12-25-18 @ 09:04) (52 - 58)  BP: 74/45 (12-25-18 @ 09:04) (71/40 - 80/51)  RR: 17 (12-24-18 @ 22:13) (16 - 17)  SpO2: 98% (12-25-18 @ 09:04) (98% - 99%)  Wt(kg): --    Exam:  nonfocal; point tenderness    CBC Full  -  ( 25 Dec 2018 06:04 )  WBC Count : 2.15 K/uL  Hemoglobin : 8.4 g/dL  Hematocrit : 27.7 %  Platelet Count - Automated : 42 K/uL  Mean Cell Volume : 88.8 fL  Mean Cell Hemoglobin : 26.9 pg  Mean Cell Hemoglobin Concentration : 30.3 g/dL  Auto Neutrophil # : 1.05 K/uL  Auto Lymphocyte # : 0.80 K/uL  Auto Monocyte # : 0.22 K/uL  Auto Eosinophil # : 0.06 K/uL  Auto Basophil # : 0.01 K/uL  Auto Neutrophil % : 48.8 %  Auto Lymphocyte % : 37.2 %  Auto Monocyte % : 10.2 %  Auto Eosinophil % : 2.8 %  Auto Basophil % : 0.5 %    12-25    137  |  104  |  15  ----------------------------<  83  4.0   |  18  |  0.8    Ca    7.7<L>      25 Dec 2018 06:04  Mg     1.9     12-25    TPro  8.2<H>  /  Alb  2.8<L>  /  TBili  1.0  /  DBili  x   /  AST  42<H>  /  ALT  14  /  AlkPhos  81  12-24    PT/INR - ( 25 Dec 2018 06:04 )   PT: 19.20 sec;   INR: 1.68 ratio         PTT - ( 24 Dec 2018 07:19 )  PTT:26.9 sec      Imaging: L1 compression fracture    Assessment/Plan:  TLSO brace, pain management  Patient would be candidate for kyphoplasty if she fails conservative management.
TOO EPSTEIN  58y Female    CHIEF COMPLAINT:    Patient is a 58y old  Female who presents with a chief complaint of Syncope (27 Dec 2018 11:39)      INTERVAL HPI/OVERNIGHT EVENTS:    Patient seen and examined. C/O backache on walking. No sob. Bradycardiac on monitor. No dizziness    ROS: All other systems are negative.    Vital Signs:    T(F): 98 (18 @ 05:20), Max: 98 (18 @ 05:20)  HR: 58 (18 @ 20:41) (58 - 60)  BP: 80/54 (18 @ 20:41) (80/54 - 96/50)  RR: 18 (18 @ 20:41) (18 - 18)  SpO2: --  I&O's Summary    Daily     Daily Weight in k.5 (28 Dec 2018 05:20)  CAPILLARY BLOOD GLUCOSE          PHYSICAL EXAM:    GENERAL:  NAD  SKIN: No rashes or lesions  HENT: Atrumatic. Normocephalic. PERRL. Moist membranes.  NECK: Supple, No JVD. No lymphadenopathy.  PULMONARY: CTA B/L. No wheezing. No rales  CVS: Normal S1, S2. Rate and Rythm are regular. No murmurs.  ABDOMEN/GI: Soft, Nontender, distended; BS present  EXTREMITIES: Peripheral pulses intact. No edema B/L LE.  NEUROLOGIC:  No motor or sensory deficit.  PSYCH: Alert & oriented x 3    Consultant(s) Notes Reviewed:  [x ] YES  [ ] NO  Care Discussed with Consultants/Other Providers [ x] YES  [ ] NO    EKG reviewed  Telemetry reviewed    LABS:                        8.9    2.97  )-----------( 45       ( 28 Dec 2018 07:05 )             29.2   Hemoglobin: 8.9 g/dL ( @ 07:05)  Hemoglobin: 10.0 g/dL ( @ 06:28)  Hemoglobin: 8.1 g/dL ( @ 11:55)  Hemoglobin: 8.4 g/dL ( @ 06:04)  Hemoglobin: 8.9 g/dL ( @ 07:19)        138  |  103  |  17  ----------------------------<  86  3.9   |  21  |  0.8    Ca    7.7<L>      28 Dec 2018 07:05    TPro  x   /  Alb  2.6<L>  /  TBili  x   /  DBili  x   /  AST  x   /  ALT  x   /  AlkPhos  x   12-              RADIOLOGY & ADDITIONAL TESTS:      Imaging or report Personally Reviewed:  [ ] YES  [ ] NO    Medications:  Standing  furosemide    Tablet 20 milliGRAM(s) Oral daily  lactulose Syrup 15 Gram(s) Oral four times a day  levothyroxine 50 MICROGram(s) Oral daily  lidocaine   Patch 1 Patch Transdermal daily  midodrine 10 milliGRAM(s) Oral three times a day  pantoprazole    Tablet 40 milliGRAM(s) Oral before breakfast  propranolol 10 milliGRAM(s) Oral daily  ursodiol Capsule 300 milliGRAM(s) Oral every 8 hours    PRN Meds  oxyCODONE    IR 5 milliGRAM(s) Oral four times a day after meals PRN      Case discussed with resident    Care discussed with pt/family

## 2018-12-28 NOTE — PROGRESS NOTE ADULT - ASSESSMENT
58 year old female with pertinent medical history of cirrhosis / Primary Biliary Cirrhosis dx 2003 ( positive AMA), chronic Left upper quadrant pain, liver cirrhosis with portal hypertension, ascites, splenomegaly and esophageal varices, thrombocytopenia awaiting liver transplant at [facility]  recently d/c for exacerbation of abdominal pain now presents s/p fall.     1.	Fall likely due to Hypotension/Bradycardia  2.	Ch. Hypotension  3.	Primary biliary cirrhosis  4.	Thrombocytopenia  5.	Splenomegaly  6.	Ac. L1 compression Fx.          PLAN:    ·	Will D/C Propranolol  ·	Pain control. Oxycodone 5 mg po q 6h prn for pain and Lidocaine patch  ·	Will change Lactulose to 30 gm po for 2-3 BM per day  ·	Neurosurgery eval noted. Recommended TLS brace  ·	Pt has appointment at [facility] on 1/4/19 to be evaluated for transplant  ·	Cont her other meds  ·	Care D/W the pt's son on bedside  ·	Poor prognosis    * Med rec reviewed with the intern. Plan of care D/W the pt's son. Time spent 40 minutes. 58 year old female with pertinent medical history of cirrhosis / Primary Biliary Cirrhosis dx 2003 ( positive AMA), chronic Left upper quadrant pain, liver cirrhosis with portal hypertension, ascites, splenomegaly and esophageal varices, thrombocytopenia awaiting liver transplant at [facility]  recently d/c for exacerbation of abdominal pain now presents s/p fall.     1.	Fall likely due to Hypotension/Bradycardia  2.	Ch. Hypotension  3.	Primary biliary cirrhosis  4.	Thrombocytopenia  5.	Splenomegaly  6.	Ac. L1 compression Fx.          PLAN:    ·	Will D/C Propranolol  ·	Pain control. Oxycodone 5 mg po q 6h prn for pain and Lidocaine patch  ·	Will change Lactulose to 15 gm po for 2-3 BM per day  ·	Neurosurgery eval noted. Recommended TLS brace  ·	Pt has appointment at [facility] on 1/4/19 to be evaluated for transplant  ·	Cont her other meds  ·	Care D/W the pt's son on bedside  ·	Poor prognosis    * Med rec reviewed with the intern. Plan of care D/W the pt's son. Time spent 40 minutes.

## 2019-01-02 DIAGNOSIS — G93.41 METABOLIC ENCEPHALOPATHY: ICD-10-CM

## 2019-01-02 DIAGNOSIS — E72.20 DISORDER OF UREA CYCLE METABOLISM, UNSPECIFIED: ICD-10-CM

## 2019-01-02 DIAGNOSIS — K76.6 PORTAL HYPERTENSION: ICD-10-CM

## 2019-01-02 DIAGNOSIS — K74.60 UNSPECIFIED CIRRHOSIS OF LIVER: ICD-10-CM

## 2019-01-02 DIAGNOSIS — R16.1 SPLENOMEGALY, NOT ELSEWHERE CLASSIFIED: ICD-10-CM
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
## 2019-01-02 DIAGNOSIS — E03.9 HYPOTHYROIDISM, UNSPECIFIED: ICD-10-CM

## 2019-01-02 DIAGNOSIS — K21.9 GASTRO-ESOPHAGEAL REFLUX DISEASE WITHOUT ESOPHAGITIS: ICD-10-CM

## 2019-01-02 DIAGNOSIS — R76.0 RAISED ANTIBODY TITER: ICD-10-CM

## 2019-01-02 DIAGNOSIS — D69.59 OTHER SECONDARY THROMBOCYTOPENIA: ICD-10-CM

## 2019-01-02 DIAGNOSIS — I27.29 OTHER SECONDARY PULMONARY HYPERTENSION: ICD-10-CM

## 2019-01-02 DIAGNOSIS — K74.5 BILIARY CIRRHOSIS, UNSPECIFIED: ICD-10-CM

## 2019-01-02 DIAGNOSIS — G89.29 OTHER CHRONIC PAIN: ICD-10-CM
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
## 2019-01-02 DIAGNOSIS — S32.019A UNSPECIFIED FRACTURE OF FIRST LUMBAR VERTEBRA, INITIAL ENCOUNTER FOR CLOSED FRACTURE: ICD-10-CM

## 2019-01-02 DIAGNOSIS — R18.8 OTHER ASCITES: ICD-10-CM

## 2019-01-02 DIAGNOSIS — D64.9 ANEMIA, UNSPECIFIED: ICD-10-CM

## 2019-01-02 DIAGNOSIS — I95.1 ORTHOSTATIC HYPOTENSION: ICD-10-CM

## 2019-01-02 DIAGNOSIS — R55 SYNCOPE AND COLLAPSE: ICD-10-CM

## 2019-01-02 DIAGNOSIS — I85.00 ESOPHAGEAL VARICES WITHOUT BLEEDING: ICD-10-CM

## 2019-01-08 PROBLEM — I95.9 HYPOTENSION, UNSPECIFIED: Chronic | Status: ACTIVE | Noted: 2018-12-24

## 2019-01-08 PROBLEM — R16.1 SPLENOMEGALY, NOT ELSEWHERE CLASSIFIED: Chronic | Status: ACTIVE | Noted: 2018-12-24

## 2019-01-08 PROBLEM — K21.9 GASTRO-ESOPHAGEAL REFLUX DISEASE WITHOUT ESOPHAGITIS: Chronic | Status: ACTIVE | Noted: 2018-12-24

## 2019-01-09 ENCOUNTER — APPOINTMENT (OUTPATIENT)
Dept: NEUROSURGERY | Facility: CLINIC | Age: 59
End: 2019-01-09
Payer: MEDICAID

## 2019-01-09 DIAGNOSIS — S32.000A WEDGE COMPRESSION FRACTURE OF UNSPECIFIED LUMBAR VERTEBRA, INITIAL ENCOUNTER FOR CLOSED FRACTURE: ICD-10-CM

## 2019-01-09 PROCEDURE — 99213 OFFICE O/P EST LOW 20 MIN: CPT

## 2019-01-11 PROBLEM — S32.000A LUMBAR COMPRESSION FRACTURE: Status: ACTIVE | Noted: 2019-01-11

## 2019-01-11 NOTE — HISTORY OF PRESENT ILLNESS
[FreeTextEntry1] : CC:  back pain\par \par HPI:  s/p fall, L1 compression fracture.  Patient reports improvement in back pain, no radicular pain.  She has been wearing TLSO brace, ambulating with walker.  \par \par Recommend PT and follow-up in 4-6 weeks.

## 2019-01-12 NOTE — PROGRESS NOTE ADULT - SUBJECTIVE AND OBJECTIVE BOX
Patient is a 57y old  Female who presents with a chief complaint of LUQ pain (15 Feb 2018 10:36)      PAST MEDICAL & SURGICAL HISTORY:  Cirrhosis of liver without ascites, unspecified hepatic cirrhosis type  HTN (hypertension)  History of appendectomy  History of tonsillectomy      MEDICATIONS  (STANDING):  ciprofloxacin     Tablet 500 milliGRAM(s) Oral every 12 hours  influenza   Vaccine 0.5 milliLiter(s) IntraMuscular once  metroNIDAZOLE    Tablet 500 milliGRAM(s) Oral every 8 hours  propranolol 20 milliGRAM(s) Oral two times a day  ursodiol Capsule 300 milliGRAM(s) Oral three times a day with meals    MEDICATIONS  (PRN):      Overnight events: No acute events overnight. Patient says she has been having headaches and blurry vision, but has been having these symptoms for the past few weeks. She is able to ambulate.    Vital Signs Last 24 Hrs  T(C): 35.9 (21 Feb 2018 06:39), Max: 36.4 (20 Feb 2018 20:14)  T(F): 96.6 (21 Feb 2018 06:39), Max: 97.5 (20 Feb 2018 20:14)  HR: 54 (21 Feb 2018 06:04) (54 - 63)  BP: 92/53 (21 Feb 2018 06:04) (92/53 - 111/56)  BP(mean): --  RR: 16 (20 Feb 2018 20:14) (16 - 18)  SpO2: 96% (20 Feb 2018 20:14) (96% - 96%)  CAPILLARY BLOOD GLUCOSE        I&O's Summary      Physical Exam:    -     General : NAD    -      HEENT: PERRL    -      Cardiac: Normal S1, S2. Normal rate and rhythm.    -      Pulm: Lungs clear b/l to auscultation.    -      GI: Non-tender, non distended. Palpable spleen below costophrenic margin.    -      Musculoskeletal: No extremity edema.    -      Neuro: AAO x 3        Labs:                        8.4    1.65  )-----------( 15       ( 20 Feb 2018 07:22 )             27.2             02-20    132<L>  |  104  |  8<L>  ----------------------------<  87  3.8   |  22  |  0.7    Ca    8.0<L>      20 Feb 2018 07:22                            Imaging:    ECG: arm pain, shoulder pain

## 2019-01-15 ENCOUNTER — INPATIENT (INPATIENT)
Facility: HOSPITAL | Age: 59
LOS: 9 days | Discharge: HOME | End: 2019-01-25
Attending: INTERNAL MEDICINE | Admitting: INTERNAL MEDICINE
Payer: MEDICAID

## 2019-01-15 VITALS
DIASTOLIC BLOOD PRESSURE: 97 MMHG | TEMPERATURE: 99 F | SYSTOLIC BLOOD PRESSURE: 145 MMHG | RESPIRATION RATE: 20 BRPM | HEART RATE: 122 BPM | OXYGEN SATURATION: 94 %

## 2019-01-15 DIAGNOSIS — Z98.890 OTHER SPECIFIED POSTPROCEDURAL STATES: Chronic | ICD-10-CM

## 2019-01-15 LAB
ALBUMIN SERPL ELPH-MCNC: 3.2 G/DL — LOW (ref 3.5–5.2)
ALP SERPL-CCNC: 122 U/L — HIGH (ref 30–115)
ALT FLD-CCNC: 20 U/L — SIGNIFICANT CHANGE UP (ref 0–41)
AMMONIA BLD-MCNC: 52 UMOL/L — SIGNIFICANT CHANGE UP (ref 11–55)
ANION GAP SERPL CALC-SCNC: 22 MMOL/L — HIGH (ref 7–14)
APTT BLD: 37.1 SEC — SIGNIFICANT CHANGE UP (ref 27–39.2)
AST SERPL-CCNC: 58 U/L — HIGH (ref 0–41)
B PERT IGG+IGM PNL SER: ABNORMAL
BASE EXCESS BLDV CALC-SCNC: -4.2 MMOL/L — LOW (ref -2–2)
BASOPHILS # BLD AUTO: 0.01 K/UL — SIGNIFICANT CHANGE UP (ref 0–0.2)
BASOPHILS NFR BLD AUTO: 0.3 % — SIGNIFICANT CHANGE UP (ref 0–1)
BILIRUB SERPL-MCNC: 1.5 MG/DL — HIGH (ref 0.2–1.2)
BUN SERPL-MCNC: 11 MG/DL — SIGNIFICANT CHANGE UP (ref 10–20)
CA-I SERPL-SCNC: 1.05 MMOL/L — LOW (ref 1.12–1.3)
CALCIUM SERPL-MCNC: 8.4 MG/DL — LOW (ref 8.5–10.1)
CHLORIDE SERPL-SCNC: 102 MMOL/L — SIGNIFICANT CHANGE UP (ref 98–110)
CO2 SERPL-SCNC: 14 MMOL/L — LOW (ref 17–32)
COLOR FLD: YELLOW — SIGNIFICANT CHANGE UP
CREAT SERPL-MCNC: 0.8 MG/DL — SIGNIFICANT CHANGE UP (ref 0.7–1.5)
EOSINOPHIL # BLD AUTO: 0.05 K/UL — SIGNIFICANT CHANGE UP (ref 0–0.7)
EOSINOPHIL NFR BLD AUTO: 1.5 % — SIGNIFICANT CHANGE UP (ref 0–8)
FLU A RESULT: NEGATIVE — SIGNIFICANT CHANGE UP
FLU A RESULT: NEGATIVE — SIGNIFICANT CHANGE UP
FLUAV AG NPH QL: NEGATIVE — SIGNIFICANT CHANGE UP
FLUBV AG NPH QL: NEGATIVE — SIGNIFICANT CHANGE UP
FLUID INTAKE SUBSTANCE CLASS: SIGNIFICANT CHANGE UP
FLUID SEGMENTED GRANULOCYTES: 42 % — SIGNIFICANT CHANGE UP
GAS PNL BLDV: 141 MMOL/L — SIGNIFICANT CHANGE UP (ref 136–145)
GAS PNL BLDV: SIGNIFICANT CHANGE UP
GAS PNL BLDV: SIGNIFICANT CHANGE UP
GLUCOSE BLDC GLUCOMTR-MCNC: 125 MG/DL — HIGH (ref 70–99)
GLUCOSE SERPL-MCNC: 121 MG/DL — HIGH (ref 70–99)
HCO3 BLDV-SCNC: 18 MMOL/L — LOW (ref 22–29)
HCT VFR BLD CALC: 35.6 % — LOW (ref 37–47)
HCT VFR BLDA CALC: 43.1 % — SIGNIFICANT CHANGE UP (ref 34–44)
HGB BLD CALC-MCNC: 14.1 G/DL — SIGNIFICANT CHANGE UP (ref 14–18)
HGB BLD-MCNC: 10.4 G/DL — LOW (ref 12–16)
IMM GRANULOCYTES NFR BLD AUTO: 0.3 % — SIGNIFICANT CHANGE UP (ref 0.1–0.3)
INR BLD: 1.55 RATIO — HIGH (ref 0.65–1.3)
LACTATE BLDV-MCNC: 4.2 MMOL/L — HIGH (ref 0.5–1.6)
LACTATE SERPL-SCNC: 5.2 MMOL/L — CRITICAL HIGH (ref 0.5–2.2)
LIDOCAIN IGE QN: 109 U/L — HIGH (ref 7–60)
LYMPHOCYTES # BLD AUTO: 1.08 K/UL — LOW (ref 1.2–3.4)
LYMPHOCYTES # BLD AUTO: 32.3 % — SIGNIFICANT CHANGE UP (ref 20.5–51.1)
LYMPHOCYTES # FLD: 24 — SIGNIFICANT CHANGE UP
MCHC RBC-ENTMCNC: 27.4 PG — SIGNIFICANT CHANGE UP (ref 27–31)
MCHC RBC-ENTMCNC: 29.2 G/DL — LOW (ref 32–37)
MCV RBC AUTO: 93.9 FL — SIGNIFICANT CHANGE UP (ref 81–99)
MONOCYTES # BLD AUTO: 0.08 K/UL — LOW (ref 0.1–0.6)
MONOCYTES NFR BLD AUTO: 2.4 % — SIGNIFICANT CHANGE UP (ref 1.7–9.3)
MONOS+MACROS # FLD: 34 % — SIGNIFICANT CHANGE UP
NEUTROPHILS # BLD AUTO: 2.11 K/UL — SIGNIFICANT CHANGE UP (ref 1.4–6.5)
NEUTROPHILS NFR BLD AUTO: 63.2 % — SIGNIFICANT CHANGE UP (ref 42.2–75.2)
PCO2 BLDV: 26 MMHG — LOW (ref 41–51)
PH BLDV: 7.45 — HIGH (ref 7.26–7.43)
PLATELET # BLD AUTO: 74 K/UL — SIGNIFICANT CHANGE UP (ref 130–400)
PO2 BLDV: 32 MMHG — SIGNIFICANT CHANGE UP (ref 20–40)
POTASSIUM BLDV-SCNC: 3.7 MMOL/L — SIGNIFICANT CHANGE UP (ref 3.3–5.6)
POTASSIUM SERPL-MCNC: 4.1 MMOL/L — SIGNIFICANT CHANGE UP (ref 3.5–5)
POTASSIUM SERPL-SCNC: 4.1 MMOL/L — SIGNIFICANT CHANGE UP (ref 3.5–5)
PROT SERPL-MCNC: 9.3 G/DL — HIGH (ref 6–8)
PROTHROM AB SERPL-ACNC: 17.7 SEC — HIGH (ref 9.95–12.87)
RBC # BLD: 3.79 M/UL — LOW (ref 4.2–5.4)
RBC # FLD: 21.2 % — HIGH (ref 11.5–14.5)
RCV VOL RI: 1000 /UL — HIGH (ref 0–5)
RSV RESULT: NEGATIVE — SIGNIFICANT CHANGE UP
RSV RNA RESP QL NAA+PROBE: NEGATIVE — SIGNIFICANT CHANGE UP
SAO2 % BLDV: 57 % — SIGNIFICANT CHANGE UP
SODIUM SERPL-SCNC: 138 MMOL/L — SIGNIFICANT CHANGE UP (ref 135–146)
TOTAL NUCLEATED CELL COUNT, BODY FLUID: 305 /UL — HIGH (ref 0–5)
TROPONIN T SERPL-MCNC: 0.03 NG/ML — CRITICAL HIGH
TROPONIN T SERPL-MCNC: <0.01 NG/ML — SIGNIFICANT CHANGE UP
TUBE TYPE: SIGNIFICANT CHANGE UP
WBC # BLD: 3.34 K/UL — LOW (ref 4.8–10.8)
WBC # FLD AUTO: 3.34 K/UL — LOW (ref 4.8–10.8)

## 2019-01-15 RX ORDER — LEVOTHYROXINE SODIUM 125 MCG
50 TABLET ORAL DAILY
Qty: 0 | Refills: 0 | Status: DISCONTINUED | OUTPATIENT
Start: 2019-01-15 | End: 2019-01-25

## 2019-01-15 RX ORDER — VANCOMYCIN HCL 1 G
1000 VIAL (EA) INTRAVENOUS ONCE
Qty: 0 | Refills: 0 | Status: COMPLETED | OUTPATIENT
Start: 2019-01-15 | End: 2019-01-15

## 2019-01-15 RX ORDER — VANCOMYCIN HCL 1 G
750 VIAL (EA) INTRAVENOUS EVERY 12 HOURS
Qty: 0 | Refills: 0 | Status: DISCONTINUED | OUTPATIENT
Start: 2019-01-15 | End: 2019-01-17

## 2019-01-15 RX ORDER — FUROSEMIDE 40 MG
20 TABLET ORAL ONCE
Qty: 0 | Refills: 0 | Status: COMPLETED | OUTPATIENT
Start: 2019-01-15 | End: 2019-01-15

## 2019-01-15 RX ORDER — MIDODRINE HYDROCHLORIDE 2.5 MG/1
10 TABLET ORAL THREE TIMES A DAY
Qty: 0 | Refills: 0 | Status: DISCONTINUED | OUTPATIENT
Start: 2019-01-15 | End: 2019-01-25

## 2019-01-15 RX ORDER — MIDODRINE HYDROCHLORIDE 2.5 MG/1
10 TABLET ORAL ONCE
Qty: 0 | Refills: 0 | Status: COMPLETED | OUTPATIENT
Start: 2019-01-15 | End: 2019-01-15

## 2019-01-15 RX ORDER — SODIUM CHLORIDE 9 MG/ML
1000 INJECTION, SOLUTION INTRAVENOUS
Qty: 0 | Refills: 0 | Status: DISCONTINUED | OUTPATIENT
Start: 2019-01-15 | End: 2019-01-15

## 2019-01-15 RX ORDER — ASPIRIN/CALCIUM CARB/MAGNESIUM 324 MG
325 TABLET ORAL ONCE
Qty: 0 | Refills: 0 | Status: COMPLETED | OUTPATIENT
Start: 2019-01-15 | End: 2019-01-15

## 2019-01-15 RX ORDER — PANTOPRAZOLE SODIUM 20 MG/1
40 TABLET, DELAYED RELEASE ORAL
Qty: 0 | Refills: 0 | Status: DISCONTINUED | OUTPATIENT
Start: 2019-01-15 | End: 2019-01-25

## 2019-01-15 RX ORDER — MORPHINE SULFATE 50 MG/1
2 CAPSULE, EXTENDED RELEASE ORAL ONCE
Qty: 0 | Refills: 0 | Status: DISCONTINUED | OUTPATIENT
Start: 2019-01-15 | End: 2019-01-15

## 2019-01-15 RX ORDER — SODIUM CHLORIDE 9 MG/ML
500 INJECTION, SOLUTION INTRAVENOUS ONCE
Qty: 0 | Refills: 0 | Status: COMPLETED | OUTPATIENT
Start: 2019-01-15 | End: 2019-01-15

## 2019-01-15 RX ORDER — MORPHINE SULFATE 50 MG/1
1 CAPSULE, EXTENDED RELEASE ORAL EVERY 6 HOURS
Qty: 0 | Refills: 0 | Status: DISCONTINUED | OUTPATIENT
Start: 2019-01-15 | End: 2019-01-15

## 2019-01-15 RX ORDER — URSODIOL 250 MG/1
300 TABLET, FILM COATED ORAL EVERY 8 HOURS
Qty: 0 | Refills: 0 | Status: DISCONTINUED | OUTPATIENT
Start: 2019-01-15 | End: 2019-01-25

## 2019-01-15 RX ORDER — ENOXAPARIN SODIUM 100 MG/ML
40 INJECTION SUBCUTANEOUS DAILY
Qty: 0 | Refills: 0 | Status: DISCONTINUED | OUTPATIENT
Start: 2019-01-15 | End: 2019-01-25

## 2019-01-15 RX ORDER — LACTULOSE 10 G/15ML
20 SOLUTION ORAL EVERY 6 HOURS
Qty: 0 | Refills: 0 | Status: DISCONTINUED | OUTPATIENT
Start: 2019-01-15 | End: 2019-01-18

## 2019-01-15 RX ORDER — CEFEPIME 1 G/1
2000 INJECTION, POWDER, FOR SOLUTION INTRAMUSCULAR; INTRAVENOUS EVERY 12 HOURS
Qty: 0 | Refills: 0 | Status: DISCONTINUED | OUTPATIENT
Start: 2019-01-15 | End: 2019-01-16

## 2019-01-15 RX ORDER — ACETAMINOPHEN 500 MG
650 TABLET ORAL ONCE
Qty: 0 | Refills: 0 | Status: COMPLETED | OUTPATIENT
Start: 2019-01-15 | End: 2019-01-15

## 2019-01-15 RX ORDER — NOREPINEPHRINE BITARTRATE/D5W 8 MG/250ML
0.05 PLASTIC BAG, INJECTION (ML) INTRAVENOUS
Qty: 8 | Refills: 0 | Status: DISCONTINUED | OUTPATIENT
Start: 2019-01-15 | End: 2019-01-19

## 2019-01-15 RX ORDER — KETOROLAC TROMETHAMINE 30 MG/ML
15 SYRINGE (ML) INJECTION ONCE
Qty: 0 | Refills: 0 | Status: DISCONTINUED | OUTPATIENT
Start: 2019-01-15 | End: 2019-01-15

## 2019-01-15 RX ORDER — SODIUM CHLORIDE 9 MG/ML
500 INJECTION INTRAMUSCULAR; INTRAVENOUS; SUBCUTANEOUS ONCE
Qty: 0 | Refills: 0 | Status: COMPLETED | OUTPATIENT
Start: 2019-01-15 | End: 2019-01-15

## 2019-01-15 RX ADMIN — MIDODRINE HYDROCHLORIDE 10 MILLIGRAM(S): 2.5 TABLET ORAL at 16:09

## 2019-01-15 RX ADMIN — Medication 20 MILLIGRAM(S): at 15:37

## 2019-01-15 RX ADMIN — CEFEPIME 100 MILLIGRAM(S): 1 INJECTION, POWDER, FOR SOLUTION INTRAMUSCULAR; INTRAVENOUS at 17:08

## 2019-01-15 RX ADMIN — CEFEPIME 100 MILLIGRAM(S): 1 INJECTION, POWDER, FOR SOLUTION INTRAMUSCULAR; INTRAVENOUS at 10:05

## 2019-01-15 RX ADMIN — Medication 650 MILLIGRAM(S): at 09:30

## 2019-01-15 RX ADMIN — Medication 250 MILLIGRAM(S): at 22:30

## 2019-01-15 RX ADMIN — SODIUM CHLORIDE 500 MILLILITER(S): 9 INJECTION INTRAMUSCULAR; INTRAVENOUS; SUBCUTANEOUS at 09:12

## 2019-01-15 RX ADMIN — Medication 325 MILLIGRAM(S): at 09:12

## 2019-01-15 RX ADMIN — Medication 1000 MILLIGRAM(S): at 12:00

## 2019-01-15 RX ADMIN — Medication 5.95 MICROGRAM(S)/KG/MIN: at 17:00

## 2019-01-15 RX ADMIN — URSODIOL 300 MILLIGRAM(S): 250 TABLET, FILM COATED ORAL at 16:09

## 2019-01-15 RX ADMIN — PANTOPRAZOLE SODIUM 40 MILLIGRAM(S): 20 TABLET, DELAYED RELEASE ORAL at 17:09

## 2019-01-15 RX ADMIN — MIDODRINE HYDROCHLORIDE 10 MILLIGRAM(S): 2.5 TABLET ORAL at 12:48

## 2019-01-15 RX ADMIN — LACTULOSE 20 GRAM(S): 10 SOLUTION ORAL at 17:09

## 2019-01-15 RX ADMIN — Medication 250 MILLIGRAM(S): at 10:49

## 2019-01-15 RX ADMIN — Medication 650 MILLIGRAM(S): at 08:30

## 2019-01-15 RX ADMIN — URSODIOL 300 MILLIGRAM(S): 250 TABLET, FILM COATED ORAL at 23:52

## 2019-01-15 RX ADMIN — Medication 15 MILLIGRAM(S): at 09:16

## 2019-01-15 RX ADMIN — SODIUM CHLORIDE 1000 MILLILITER(S): 9 INJECTION, SOLUTION INTRAVENOUS at 12:01

## 2019-01-15 RX ADMIN — Medication 15 MILLIGRAM(S): at 09:45

## 2019-01-15 RX ADMIN — MIDODRINE HYDROCHLORIDE 10 MILLIGRAM(S): 2.5 TABLET ORAL at 23:52

## 2019-01-15 NOTE — CHART NOTE - NSCHARTNOTEFT_GEN_A_CORE
At 7:55, Pt found to have fever by rectal temp now and lactate 3.3.   Will give small bolus of fluid and check ucx and bcx and flu a/b.  Admission delayed for continued workup after fever noted.  Pt signed out to Dr. Esqueda.    Unable to document in original ED Provider document because the document is locked due to a cancelled admission.

## 2019-01-15 NOTE — H&P ADULT - NSHPPHYSICALEXAM_GEN_ALL_CORE
Vital Signs Last 24 Hrs  T(C): 38.2 (15 Milton 2019 11:00), Max: 39.7 (15 Milton 2019 10:00)  T(F): 100.7 (15 Milton 2019 11:00), Max: 103.4 (15 Milton 2019 10:00)  HR: 91 (15 Milton 2019 11:00) (91 - 122)  BP: 103/54 (15 Milton 2019 11:00) (94/55 - 145/97)  RR: 17 (15 Milton 2019 11:00) (17 - 21)  SpO2: 96% (15 Milton 2019 11:00) (93% - 97%)      alert oriented not in any acute distress  chest clear bilaterally   cvs s1 and s2 no added sound  abdomen soft lax non tender + fluid shift  no asterixes  no pedal edema  neurology all cranial nerves intact  sensations intact bilaterally  motor 5/5 in both UL/LL bilaterally

## 2019-01-15 NOTE — H&P ADULT - HISTORY OF PRESENT ILLNESS
58 years old female pt with past medical hx of liver cirrhosis (secondary to primary biliary cirrhosis) complicated with ascites and portopulmonary HTN came to ER because of abdominal and chest pain since yesterday.  As per son, patient was recently admitted almost 3 months back because of chest pain, was fully worked up, found to have large ascites which resolved with diuretics, she is on active transplant list in [place] awaiting or cardiology clearanace.  Since yesterday night she was complaining of chest pain mainly in epigastric area, non radiating associated with nausea and vomiting gradually worsened over time, since morning pain was worst was 5-6 /10, she has associated abdominal discomfort but denies any pain.  she had 1 episode of fever 103 in ER.  she denies any hedache, no dizziness, no lower leg swelling, no urinary complaints.  In ER she was hypotensive, give given a total of 1 L iv BOLUS, was given stat IV vancomycin and cefepime.

## 2019-01-15 NOTE — ED PROVIDER NOTE - ATTENDING CONTRIBUTION TO CARE
liver cirrhosis.   AP since 1pm. n/v. no fever. 57 y/o F here with AP since 1am woke her from sleep. PMH liver cirrhosis, PBS, evaluated for transplant of [facility].  Often hypotensive on midodrine.  has portal htn.  Recent L1 fx and in TLSO brace currently.  n/v. no fever. 57 y/o F here with AP since 1am woke her from sleep. PMH liver cirrhosis, PBS, evaluated for transplant of [facility].  Often hypotensive on midodrine.  has portal htn.  Recent L1 fx and in TLSO brace currently.  n/v. no fever.   Pt with emesis x 1 here in ER and now feels much better with less abd distention.  Also, c/o L sided CP with rads to L arm and shoulder.  EXAM: well appearing. NAD. s1s2, reg. CTAB. abd soft, nd, generalized ttp.  No guarding or rebound.  P: labs, ekg, cxr, CT a/p. afebrile here in ER.

## 2019-01-15 NOTE — CONSULT NOTE ADULT - ASSESSMENT
IMPRESSION:    Septic shock  History of cirrhosis with ascites  Portpulmonary hypertension        PLAN:    CNS:    HEENT: Oral care    PULMONARY:  HOB @ 45 degrees    CARDIOVASCULAR: Give Bolus of 500cc of NS and reassess BP, 2d echo, cardiac enzymes serially, restart home dose of midodrine.   If patient remains hypotensive despite IVF boluses, start Levophed.     GI: GI prophylaxis.  GI evaluation. NPO for now.     RENAL:  Follow up lytes.  Correct as needed. Repeat ABG with lactate levels.    INFECTIOUS DISEASE: PAN cultures, start Meropenem and vancomycin, diagnostic paracentesis    HEMATOLOGICAL:  DVT prophylaxis.    ENDOCRINE:  Follow up FS.     MUSCULOSKELETAL: out of bed as tolerated    Admit to ICU. IMPRESSION:    Sepsis / Septic shock  RO SBP SP paracentesis   History of cirrhosis with ascites  Portopulmonary hypertension        PLAN:    CNS: No depressants     HEENT: Oral care    PULMONARY:  HOB @ 45 degrees    CARDIOVASCULAR: Give Bolus of 500cc of NS and reassess BP, 2d echo, cardiac enzymes serially, restart home dose of midodrine. Wean Levophed.     GI: GI prophylaxis.  GI evaluation. Feeding for now. RUQ sono    RENAL:  Follow up lytes.  Correct as needed. Repeat ABG with lactate levels.    INFECTIOUS DISEASE: PAN cultures, start Meropenem and vancomycin, FU diagnostic paracentesis    HEMATOLOGICAL:  DVT prophylaxis.    ENDOCRINE:  Follow up FS.     MUSCULOSKELETAL: out of bed as tolerated    Admit to ICU.

## 2019-01-15 NOTE — H&P ADULT - NSHPLABSRESULTS_GEN_ALL_CORE
01-15    138  |  102  |  11  ----------------------------<  121<H>  4.1   |  14<L>  |  0.8    Ca    8.4<L>      15 Milton 2019 04:46    TPro  9.3<H>  /  Alb  3.2<L>  /  TBili  1.5<H>  /  DBili  x   /  AST  58<H>  /  ALT  20  /  AlkPhos  122<H>  01-15                            10.4   3.34  )-----------( 74       ( 15 Milton 2019 04:46 )             35.6     Blood Gas Venous - Lactate (01.15.19 @ 09:19)    Blood Gas Venous - Lactate: 4.2 mmoL/L     CT Angio Chest Dissection Protocol (01.15.19 @ 06:15) >    IMPRESSION: No CT evidence of aortic dissection.    Stable solid pulmonary nodules since December 16, 2018, measuring up to 4   mm.    No acute intrathoracic abnormality.    Hepatic cirrhosis with large abdominal pelvic ascites and splenomegaly,   consistent with portal hypertension, unchanged.    Subacute L1 superior endplate compression fracture.    Main pulmonary artery dilatation, indicative of pulmonary arterial   hypertension.      CT Abdomen and Pelvis w/ IV Cont (12.24.18 @ 08:19) >    IMPRESSION:   1. Acute, mild L1 vertebral body compression fracture, without   significant osseous retropulsion.  2. Liver cirrhosis with portal hypertension.  3. Dilated main pulmonary artery, 3.6 cm suggestive of pulmonary arterial   hypertension.

## 2019-01-15 NOTE — H&P ADULT - NSHPSOCIALHISTORY_GEN_ALL_CORE
denies alcohol, no illicit drugs  she is ex smoker, quit almost 2 month ago, used to smoke 3-4 cigarettes per day for > 35 years

## 2019-01-15 NOTE — ED PROVIDER NOTE - CARE PLAN
Principal Discharge DX:	Chest pain  Secondary Diagnosis:	Abdominal pain  Secondary Diagnosis:	Nausea and vomiting

## 2019-01-15 NOTE — CONSULT NOTE ADULT - SUBJECTIVE AND OBJECTIVE BOX
Patient is a 58y old  Female who presents with a chief complaint of chest pain, hypotension    HPI:    58 y.o female with hx of cirrhosis, jolene pulmonary hypertension presents to the ED for evaluation of abdominal and chest pain x 1.5 days.  Pt pt's son pt has been complaining of chest/abdominal/back pain x 1.5 days.  Since onset progressively worse associated with nausea and emesis.  Around 0100 hrs today pain intensified prompting visit to the ED. No alleviating or exacerbating factors. Has had stress test within past 3 months which was normal. PMD dr carlisle.  No recent travel, prolonged periods of being sedentary, hx of PE/DVT, hemoptysis, hx of cancer. EKG in the ED showed T wave depressions unchanged from prio, CT chest and abdomen-pelvis was done showing only acites.  Her Tmax was 103.4, and BP was 77/50 in th ED.      PAST MEDICAL & SURGICAL HISTORY:  Splenomegaly  Hypotension  GERD (gastroesophageal reflux disease)  Liver cirrhosis  History of appendectomy  History of tonsillectomy      SOCIAL HX:   Smoking  neg                       ETOH  neg                         Other  neg    FAMILY HISTORY:  :  No known cardiovacular family hisotry     ROS:  See HPI     Allergies    No Known Allergies    Intolerances          PHYSICAL EXAM    ICU Vital Signs Last 24 Hrs  T(C): 38.2 (15 Milton 2019 11:00), Max: 39.7 (15 Milton 2019 10:00)  T(F): 100.7 (15 Milton 2019 11:00), Max: 103.4 (15 Milton 2019 10:00)  HR: 91 (15 Milton 2019 11:00) (91 - 122)  BP: 103/54 (15 Milton 2019 11:00) (94/55 - 145/97)  BP(mean): --  ABP: --  ABP(mean): --  RR: 17 (15 Milton 2019 11:00) (17 - 21)  SpO2: 96% (15 Milton 2019 11:00) (93% - 97%)      General: In NAD   HEENT:  HEMA              Lymphatic system: No cervical LN   Lungs: Bilateral BS  Cardiovascular: Regular  Gastrointestinal: Soft, Positive BS  Musculoskeletal: No clubbing.  Moves all extremities.  Full range of motion   Skin: Warm.  Intact  Neurological: No motor or sensory deficit         LABS:                          10.4   3.34  )-----------( 74       ( 15 Milton 2019 04:46 )             35.6                                               01-15    138  |  102  |  11  ----------------------------<  121<H>  4.1   |  14<L>  |  0.8    Ca    8.4<L>      15 Milton 2019 04:46    TPro  9.3<H>  /  Alb  3.2<L>  /  TBili  1.5<H>  /  DBili  x   /  AST  58<H>  /  ALT  20  /  AlkPhos  122<H>  01-15      PT/INR - ( 15 Milton 2019 04:46 )   PT: 17.70 sec;   INR: 1.55 ratio         PTT - ( 15 Milton 2019 04:46 )  PTT:37.1 sec                                           CARDIAC MARKERS ( 15 Milton 2019 04:46 )  x     / <0.01 ng/mL / x     / x     / x                                                LIVER FUNCTIONS - ( 15 Milton 2019 04:46 )  Alb: 3.2 g/dL / Pro: 9.3 g/dL / ALK PHOS: 122 U/L / ALT: 20 U/L / AST: 58 U/L / GGT: x                                                                                                                                       X-Rays no infiltrates                   CT chest done with no acute pulmonary pathology                                                                       MEDICATIONS  (STANDING):  cefepime   IVPB 2000 milliGRAM(s) IV Intermittent every 12 hours  lactated ringers Bolus 500 milliLiter(s) IV Bolus once  midodrine 10 milliGRAM(s) Oral Once    MEDICATIONS  (PRN):

## 2019-01-15 NOTE — ED PROVIDER NOTE - OBJECTIVE STATEMENT
58 y.o female with hx of cirrhosis, pulmonary hypertension presents to the ED for evaluation of abdominal and chest pain x 1.5 days.  Pt pt's son pt has been complaining of chest/abdominal/back pain x 1.5 days.  Since onset progressively worse associated with NBNB emesis.  Around 0010 hrs today pain intensified prompting visit to the ED. No alleviating or exacerbating factors. Has had stress test within past 3 months which was normal. PMD dr carlisle.  No recent travel, prolonged periods of being sedentary, hx of PE/DVT, hemoptysis, hx of cancer. 58 y.o female with hx of cirrhosis, pulmonary hypertension presents to the ED for evaluation of abdominal and chest pain x 1.5 days.  Pt pt's son pt has been complaining of chest/abdominal/back pain x 1.5 days.  Since onset progressively worse associated with NBNB emesis.  Around 0100 hrs today pain intensified prompting visit to the ED. No alleviating or exacerbating factors. Has had stress test within past 3 months which was normal. PMD dr carlisle.  No recent travel, prolonged periods of being sedentary, hx of PE/DVT, hemoptysis, hx of cancer.

## 2019-01-15 NOTE — H&P ADULT - ASSESSMENT
58 years old female pt with past medical hx of liver cirrhosis (secondary to primary biliary cirrhosis) complicated with ascites and portopulmonary HTN came to ER because of abdominal and chest pain since yesterday.    # liver cirrhosis/ positive ascites     need to rule out SBP     s/p 1 dose of vancomycin and cefepime     will do therapeutic peritoneal tapping     will send fluid for cultures, cell count, albumin     will continue cefepime/ vancomycin     abdominal sonogram     pantoprazole 40 mg po once daily     lactulose 20 mg q 4 hpurly target bowel movement 3-4     will check ammonia level    # septic shock etilogy likely SBP     lactic acidosis     s/p 1 l of LR     will resume home dose of midodrine     will start gentle hydration 0.9 % NS at 75 cc/hour     will continue cefepime and vancomycin     will get vancomycin trough before 4th dose     blood culture and urine culture    # chest pain     cardiac enzymes neg     recent echo showed pulmonary HTN, normal EF     normal stress test      likely secondary to abdominal distention    # DVT prophylaxis     will start enoxaparin     monitor plt ,INR daily    # diet regular  # full code  # activity ambulate as tolerated

## 2019-01-15 NOTE — PROCEDURE NOTE - PROCEDURE
<<-----Click on this checkbox to enter Procedure Paracentesis at bedside  01/15/2019    Active  MONICA

## 2019-01-15 NOTE — ED PROVIDER NOTE - NS ED ROS FT
Constitutional: See HPI.  Eyes: No visual changes, eye pain or discharge.   ENMT: No hearing changes, pain, discharge or infections. No neck pain or stiffness. No limited ROM  Cardiac: + chest pain. No SOB or edema. No chest pain with exertion.  Respiratory: No cough or respiratory distress. No hemoptysis.   GI: + abdominal pain, + nausea, + vomiting. No diarrhea.  : No dysuria, frequency or burning. No Discharge  MS: No myalgia, muscle weakness, joint pain or back pain.  Neuro: No headache or weakness.   Skin: No skin rash.  Except as documented in the HPI, all other systems are negative.

## 2019-01-15 NOTE — H&P ADULT - FAMILY HISTORY
Father  Still living? Unknown  Family history of heart disease, Age at diagnosis: Age Unknown     Sibling  Still living? Unknown  Family history of diabetes mellitus, Age at diagnosis: Age Unknown

## 2019-01-15 NOTE — ED PROVIDER NOTE - PROGRESS NOTE DETAILS
Discussed case with MAR.  Will follow lactate.  Pt with soft abdomen on re-exam.  Complaining of mild midsternal chest pain.  no further complaints.  Resting comfortably in bed.

## 2019-01-15 NOTE — ED PROVIDER NOTE - PHYSICAL EXAMINATION
CONST: Well appearing in NAD  EYES: Sclera and conjunctiva clear.  NECK: Non-tender, no meningeal signs  CARD: +tachycardic, + regular, Normal S1 S2;   RESP: Equal BS B/L, No wheezes, rhonchi or rales. No distress  GI: + diffuse abdominal pain, no rebound or guarding, Soft, non-distended, no CVA tenderness  MS: Normal ROM in all extremities. No edema of lower extremities, no calf pain, radial pulses 2+ bilaterally  SKIN: Warm, dry, no acute rashes. Good turgor  NEURO: A&Ox3, No focal deficits. Strength 5/5 with no sensory deficits

## 2019-01-15 NOTE — ED PROVIDER NOTE - SHIFT CHANGE DETAILS
patient received at shift change, was dispositioned to inpatient service by Juanita Avery/Dain but had change in status shortly after disposition. Pt had additional workup done with labs and IVF, repeat EKGs. Consult made to ICU team with bedside evaluation by [facility], plan is for ICU admission.

## 2019-01-16 LAB
ALBUMIN FLD-MCNC: 0.7 G/DL — SIGNIFICANT CHANGE UP
ANION GAP SERPL CALC-SCNC: 19 MMOL/L — HIGH (ref 7–14)
APPEARANCE UR: CLEAR — SIGNIFICANT CHANGE UP
BILIRUB UR-MCNC: ABNORMAL
BUN SERPL-MCNC: 21 MG/DL — HIGH (ref 10–20)
CALCIUM SERPL-MCNC: 8.5 MG/DL — SIGNIFICANT CHANGE UP (ref 8.5–10.1)
CHLORIDE SERPL-SCNC: 106 MMOL/L — SIGNIFICANT CHANGE UP (ref 98–110)
CHOLEST SERPL-MCNC: 44 MG/DL — LOW (ref 100–200)
CK MB CFR SERPL CALC: 2.5 NG/ML — SIGNIFICANT CHANGE UP (ref 0.6–6.3)
CO2 SERPL-SCNC: 14 MMOL/L — LOW (ref 17–32)
COLOR SPEC: ABNORMAL
CREAT SERPL-MCNC: 1 MG/DL — SIGNIFICANT CHANGE UP (ref 0.7–1.5)
DIFF PNL FLD: NEGATIVE — SIGNIFICANT CHANGE UP
EPI CELLS # UR: ABNORMAL /HPF
GLUCOSE FLD-MCNC: 114 MG/DL — SIGNIFICANT CHANGE UP
GLUCOSE SERPL-MCNC: 118 MG/DL — HIGH (ref 70–99)
GLUCOSE UR QL: NEGATIVE — SIGNIFICANT CHANGE UP
GRAM STN FLD: SIGNIFICANT CHANGE UP
HCT VFR BLD CALC: 33.2 % — LOW (ref 37–47)
HDLC SERPL-MCNC: 16 MG/DL — LOW
HGB BLD-MCNC: 10 G/DL — LOW (ref 12–16)
KETONES UR-MCNC: ABNORMAL
LACTATE SERPL-SCNC: 2.7 MMOL/L — HIGH (ref 0.5–2.2)
LACTATE SERPL-SCNC: 3.3 MMOL/L — HIGH (ref 0.5–2.2)
LDH SERPL L TO P-CCNC: 81 U/L — SIGNIFICANT CHANGE UP
LEUKOCYTE ESTERASE UR-ACNC: ABNORMAL
LIPID PNL WITH DIRECT LDL SERPL: 21 MG/DL — SIGNIFICANT CHANGE UP (ref 4–129)
MAGNESIUM SERPL-MCNC: 2 MG/DL — SIGNIFICANT CHANGE UP (ref 1.8–2.4)
MCHC RBC-ENTMCNC: 27.2 PG — SIGNIFICANT CHANGE UP (ref 27–31)
MCHC RBC-ENTMCNC: 30.1 G/DL — LOW (ref 32–37)
MCV RBC AUTO: 90.2 FL — SIGNIFICANT CHANGE UP (ref 81–99)
MRSA PCR RESULT.: NEGATIVE — SIGNIFICANT CHANGE UP
NITRITE UR-MCNC: NEGATIVE — SIGNIFICANT CHANGE UP
NRBC # BLD: 0 /100 WBCS — SIGNIFICANT CHANGE UP (ref 0–0)
PH UR: 6 — SIGNIFICANT CHANGE UP (ref 5–8)
PHOSPHATE SERPL-MCNC: 3.5 MG/DL — SIGNIFICANT CHANGE UP (ref 2.1–4.9)
PLATELET # BLD AUTO: 56 K/UL — LOW (ref 130–400)
POTASSIUM SERPL-MCNC: 3.7 MMOL/L — SIGNIFICANT CHANGE UP (ref 3.5–5)
POTASSIUM SERPL-SCNC: 3.7 MMOL/L — SIGNIFICANT CHANGE UP (ref 3.5–5)
PROT FLD-MCNC: 2.4 G/DL — SIGNIFICANT CHANGE UP
PROT UR-MCNC: 30
RBC # BLD: 3.68 M/UL — LOW (ref 4.2–5.4)
RBC # FLD: 21.5 % — HIGH (ref 11.5–14.5)
RBC CASTS # UR COMP ASSIST: SIGNIFICANT CHANGE UP /HPF
SODIUM SERPL-SCNC: 139 MMOL/L — SIGNIFICANT CHANGE UP (ref 135–146)
SP GR SPEC: >=1.03 — SIGNIFICANT CHANGE UP (ref 1.01–1.03)
SPECIMEN SOURCE: SIGNIFICANT CHANGE UP
TOTAL CHOLESTEROL/HDL RATIO MEASUREMENT: 2.8 RATIO — LOW (ref 4–5.5)
TRIGL SERPL-MCNC: 54 MG/DL — SIGNIFICANT CHANGE UP (ref 10–149)
TROPONIN T SERPL-MCNC: 0.01 NG/ML — SIGNIFICANT CHANGE UP
UROBILINOGEN FLD QL: 1 (ref 0.2–0.2)
VANCOMYCIN TROUGH SERPL-MCNC: 12.3 UG/ML — HIGH (ref 5–10)
WBC # BLD: 12.98 K/UL — HIGH (ref 4.8–10.8)
WBC # FLD AUTO: 12.98 K/UL — HIGH (ref 4.8–10.8)
WBC UR QL: SIGNIFICANT CHANGE UP /HPF

## 2019-01-16 RX ORDER — CHLORHEXIDINE GLUCONATE 213 G/1000ML
1 SOLUTION TOPICAL
Qty: 0 | Refills: 0 | Status: DISCONTINUED | OUTPATIENT
Start: 2019-01-16 | End: 2019-01-25

## 2019-01-16 RX ORDER — SODIUM BICARBONATE 1 MEQ/ML
650 SYRINGE (ML) INTRAVENOUS THREE TIMES A DAY
Qty: 0 | Refills: 0 | Status: DISCONTINUED | OUTPATIENT
Start: 2019-01-16 | End: 2019-01-17

## 2019-01-16 RX ORDER — MEROPENEM 1 G/30ML
1000 INJECTION INTRAVENOUS EVERY 8 HOURS
Qty: 0 | Refills: 0 | Status: DISCONTINUED | OUTPATIENT
Start: 2019-01-16 | End: 2019-01-17

## 2019-01-16 RX ADMIN — PANTOPRAZOLE SODIUM 40 MILLIGRAM(S): 20 TABLET, DELAYED RELEASE ORAL at 06:16

## 2019-01-16 RX ADMIN — LACTULOSE 20 GRAM(S): 10 SOLUTION ORAL at 00:02

## 2019-01-16 RX ADMIN — LACTULOSE 20 GRAM(S): 10 SOLUTION ORAL at 23:27

## 2019-01-16 RX ADMIN — Medication 650 MILLIGRAM(S): at 21:51

## 2019-01-16 RX ADMIN — LACTULOSE 20 GRAM(S): 10 SOLUTION ORAL at 12:22

## 2019-01-16 RX ADMIN — MIDODRINE HYDROCHLORIDE 10 MILLIGRAM(S): 2.5 TABLET ORAL at 06:16

## 2019-01-16 RX ADMIN — Medication 5.95 MICROGRAM(S)/KG/MIN: at 19:06

## 2019-01-16 RX ADMIN — ENOXAPARIN SODIUM 40 MILLIGRAM(S): 100 INJECTION SUBCUTANEOUS at 12:21

## 2019-01-16 RX ADMIN — LACTULOSE 20 GRAM(S): 10 SOLUTION ORAL at 06:16

## 2019-01-16 RX ADMIN — MEROPENEM 100 MILLIGRAM(S): 1 INJECTION INTRAVENOUS at 21:51

## 2019-01-16 RX ADMIN — URSODIOL 300 MILLIGRAM(S): 250 TABLET, FILM COATED ORAL at 21:52

## 2019-01-16 RX ADMIN — PANTOPRAZOLE SODIUM 40 MILLIGRAM(S): 20 TABLET, DELAYED RELEASE ORAL at 17:56

## 2019-01-16 RX ADMIN — URSODIOL 300 MILLIGRAM(S): 250 TABLET, FILM COATED ORAL at 06:16

## 2019-01-16 RX ADMIN — MIDODRINE HYDROCHLORIDE 10 MILLIGRAM(S): 2.5 TABLET ORAL at 21:51

## 2019-01-16 RX ADMIN — Medication 250 MILLIGRAM(S): at 17:56

## 2019-01-16 RX ADMIN — LACTULOSE 20 GRAM(S): 10 SOLUTION ORAL at 17:56

## 2019-01-16 RX ADMIN — MEROPENEM 100 MILLIGRAM(S): 1 INJECTION INTRAVENOUS at 15:24

## 2019-01-16 RX ADMIN — Medication 250 MILLIGRAM(S): at 06:15

## 2019-01-16 RX ADMIN — CEFEPIME 100 MILLIGRAM(S): 1 INJECTION, POWDER, FOR SOLUTION INTRAMUSCULAR; INTRAVENOUS at 06:15

## 2019-01-16 RX ADMIN — Medication 50 MICROGRAM(S): at 06:16

## 2019-01-16 RX ADMIN — MIDODRINE HYDROCHLORIDE 10 MILLIGRAM(S): 2.5 TABLET ORAL at 15:25

## 2019-01-16 RX ADMIN — URSODIOL 300 MILLIGRAM(S): 250 TABLET, FILM COATED ORAL at 15:24

## 2019-01-16 RX ADMIN — Medication 650 MILLIGRAM(S): at 16:17

## 2019-01-16 NOTE — PROGRESS NOTE ADULT - ASSESSMENT
IMPRESSION    PLAN IMPRESSION    Cirrhosis, Acute Decompensation with new onset ascites, r/o SBP  Septic Shock   Hypothyroidism     PLAN    CNS: Recent L1 compression fx s/p trauma, per neurosurgery no intervention. Avoid CNS depressants.     HEENT:    PULMONARY:    CARDIOVASCULAR: Wean off Levophed, 500cc bolus if needed. Continue home midodrine. Serial troponins, EKG. Recent echo with normal systolic function and recent stress test negative.     GI: GI prophylaxis. Continue lactulose and ursodiol. GI consulted for decompensation of cirrhosis with new onset ascites s/p therapeutic paracentesis (studies negative to date). Per [facility], patient had one appointment for consideration of liver transplant.     RENAL: Nephrology consulted. Monitor electrolytes and I&Os.     INFECTIOUS DISEASE: ID consulted. Continue cefepime and vanc, follow up cultures. Lactate downtrending.     ENDOCRINE: Continue Synthroid.     HEMATOLOGIC: DVT prophylaxis     CCU MONITORING  FULL CODE

## 2019-01-16 NOTE — PROGRESS NOTE ADULT - SUBJECTIVE AND OBJECTIVE BOX
SUBJECTIVE:    Patient is a 58y old Female who presents with a chief complaint of abdominal pain for 1.5 days (16 Jan 2019 08:36)    Currently admitted to medicine with the primary diagnosis of chest/abdominal pain      Today is hospital day 1d. Patient admitted for acute decompensation of cirrhosis with new onset ascites and abdominal pain, rule out SBP.     PAST MEDICAL & SURGICAL HISTORY  Splenomegaly  Hypotension  GERD (gastroesophageal reflux disease)  Liver cirrhosis  History of appendectomy  History of tonsillectomy    SOCIAL HISTORY:  Denies alcohol and illicit drug use  Former smoker, quit 2 months ago, 3-4 cigarettes for 35 years     From (X ) home ( ) nursing home ( ) other   Ambulation status: (X ) ambulates independently ( ) ambulates with assistance ( ) ambulates with device ( ) dependent   Device: ( ) rolling walker ( ) cane     ALLERGIES:  No Known Allergies    MEDICATIONS:  STANDING MEDICATIONS  chlorhexidine 4% Liquid 1 Application(s) Topical <User Schedule>  enoxaparin Injectable 40 milliGRAM(s) SubCutaneous daily  lactulose Syrup 20 Gram(s) Oral every 6 hours  levothyroxine 50 MICROGram(s) Oral daily  meropenem  IVPB 1000 milliGRAM(s) IV Intermittent every 8 hours  midodrine 10 milliGRAM(s) Oral three times a day  norepinephrine Infusion 0.05 MICROgram(s)/kG/Min IV Continuous <Continuous>  pantoprazole    Tablet 40 milliGRAM(s) Oral two times a day  sodium bicarbonate 650 milliGRAM(s) Oral three times a day  ursodiol Capsule 300 milliGRAM(s) Oral every 8 hours  vancomycin  IVPB 750 milliGRAM(s) IV Intermittent every 12 hours    PRN MEDICATIONS  morphine  - Injectable 1 milliGRAM(s) IV Push every 6 hours PRN    VITALS:   T(F): 97.1  HR: 70  BP: 88/59  RR: 29  SpO2: 100%    LABS:                        10.0   12.98 )-----------( 56       ( 16 Jan 2019 04:18 )             33.2     01-16    139  |  106  |  21<H>  ----------------------------<  118<H>  3.7   |  14<L>  |  1.0    Ca    8.5      16 Jan 2019 04:18  Phos  3.5     01-16  Mg     2.0     01-16    TPro  9.3<H>  /  Alb  3.2<L>  /  TBili  1.5<H>  /  DBili  x   /  AST  58<H>  /  ALT  20  /  AlkPhos  122<H>  01-15    PT/INR - ( 15 [place] 2019 04:46 )   PT: 17.70 sec;   INR: 1.55 ratio         PTT - ( 15 [place] 2019 04:46 )  PTT:37.1 sec  Urinalysis Basic - ( 16 Jan 2019 09:50 )    Color: Orange / Appearance: Clear / SG: >=1.030 / pH: x  Gluc: x / Ketone: Trace  / Bili: Moderate / Urobili: 1.0   Blood: x / Protein: 30 / Nitrite: Negative   Leuk Esterase: Trace / RBC: 1-2 /HPF / WBC 3-5 /HPF   Sq Epi: x / Non Sq Epi: Occasional /HPF / Bacteria: x    Lactate, Blood: 2.7 mmol/L <H> (01-16-19 @ 04:18)  Lactate, Blood: 3.3 mmol/L <H> (01-16-19 @ 01:02)  Troponin T, Serum: 0.03 ng/mL <HH> (01-15-19 @ 20:40)  Lactate, Blood: 5.2 mmol/L <HH> (01-15-19 @ 16:47)    Culture - Body Fluid with Gram Stain (collected 15 [place] 2019 12:56)  Source: .Body Fluid Peritoneal Fluid  Gram Stain (16 Jan 2019 03:00):    polymorphonuclear leukocytes seen    No organisms seen    by cytocentrifuge    CARDIAC MARKERS ( 15 [place] 2019 20:40 )  x     / 0.03 ng/mL / x     / x     / x      CARDIAC MARKERS ( 15 [place] 2019 04:46 )  x     / <0.01 ng/mL / x     / x     / x        RADIOLOGY:  CXR  Impression:    Low lung volumes with bibasilar atelectasis.    Abdominal US  IMPRESSION:  Moderate to large volume abdominopelvic ascites.    PHYSICAL EXAM:  GEN: No acute distress, lying comfortably in bed   LUNGS: Clear to auscultation bilaterally, no labored breathing   HEART: S1/S2 present. RRR.   ABD: Soft, non-tender, non-distended. Bowel sounds present.   EXT: Noncyanotic, nonedematous, 2+ peripheral pulses, skin intact. No asterixis.   NEURO: AAOX3, CN II-XII grossly intact     Lines/Tubes   Nasal cannula   Peripheral IV access

## 2019-01-16 NOTE — CONSULT NOTE ADULT - ASSESSMENT
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  Pt was admitted for diagnosis of septic shock possibly 2/2 SBP.     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  Pt was admitted for diagnosis of septic shock possibly 2/2 SBP.     #Septic Shock? - Unlikely SBP  - PMNs <250 and SAAG consistent w/ transudate 2/2 portal HTN. Gram stain negative  - would consider D/C abx if no more febrile episodes  - f/u ID    #Decompensated liver cirrhosis 2/2 PBC on liver transplant list - MELD 13  - Hb at baseline 10  - Pt needs to be on lasix 40 and aldactone 10. Consider increasing dose of lasix if BP allows  - avoid NSAIDs and BBlockers - titrate of levo.  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  Pt was admitted for diagnosis of septic shock possibly 2/2 SBP.     #Septic Shock? - Unlikely SBP  - PMNs <250 and SAAG consistent w/ transudate 2/2 portal HTN. Gram stain negative  - would consider D/C abx if no more febrile episodes  - f/u ID    #Decompensated liver cirrhosis 2/2 PBC on liver transplant list - MELD 13  - Hb at baseline 10  - Pt needs to be on lasix 40 and aldactone 10. Consider increasing dose of lasix if BP allows  - avoid NSAIDs and BBlockers - titrate of levo.  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  Pt was admitted for diagnosis of septic shock possibly 2/2 SBP.     #Septic Shock? - Unlikely SBP  - PMNs <250 and SAAG consistent w/ transudate 2/2 portal HTN. Gram stain negative  - would consider D/C abx if no more febrile episodes  - f/u ID    #Decompensated liver cirrhosis 2/2 PBC on liver transplant list - MELD 13  - Hb at baseline 10  - Pt needs to be on lasix 40 and aldactone 10. Consider increasing dose of lasix if BP allows  - avoid NSAIDs and BBlockers - titrate off levo.  - c/w midodrine  - will need therapeutic paracentesis if has increased ascites  - To contact [facility] [place] - Dr. Srinivasan  - case discussed with attending

## 2019-01-16 NOTE — CONSULT NOTE ADULT - SUBJECTIVE AND OBJECTIVE BOX
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 As per son, patient was recently admitted almost 3 months back because of chest pain, was fully worked up, found to have large ascites which resolved with diuretics, she is on active transplant list in [place] awaiting or cardiology clearanace. Since yesterday night she was complaining of chest pain mainly in epigastric area, non radiating associated with nausea and vomiting gradually worsened over time, since morning pain was worst was 5-6 /10, she has associated abdominal discomfort but denies any pain.she denies any hedache, no dizziness, no lower leg swelling, no urinary complaints.    ER: Tmax 103F. She was hypotensive, give given a total of 1 L iv BOLUS, was given stat IV vancomycin and cefepime. (15 [place] 2019 12:30). pt was admitted for a diagnosis of septic shock possibly 2/2 SBP.    EGD: (11/18): chronic superficial gastritis. Grade B esophageal varices w/ 4 bands      PAST MEDICAL & SURGICAL HISTORY:  Splenomegaly  Hypotension  GERD (gastroesophageal reflux disease)  Liver cirrhosis  History of appendectomy  History of tonsillectomy      Home Medications:   * Patient Currently Takes Medications as of 28-Dec-2018 11:35 documented in Structured Notes  · 	oxyCODONE 5 mg oral tablet: 1 tab(s) orally 1 to 4 times a day, As Needed -Severe Pain (7 - 10) MDD:4   · 	lactulose 10 g/15 mL oral syrup: 5 milliliter(s) orally 3 times a day MDD:titrate till 2-3 bowel movements / day  · 	lidocaine 5% topical film: Apply topically to affected area once a day MDD:apply for 12 hours on and 12 hours off  · 	midodrine 10 mg oral tablet: 1 tab(s) orally 3 times a day  · 	levothyroxine 50 mcg (0.05 mg) oral tablet: 1 tab(s) orally once a day  · 	pantoprazole 40 mg oral delayed release tablet: 1 tab(s) orally once a day (before a meal)  · 	furosemide 20 mg oral tablet: 1 tab(s) orally once a day at 12PM  · 	ursodiol 300 mg oral tablet: 1 tab(s) orally 3 times a day      MEDICATIONS  (STANDING):  chlorhexidine 4% Liquid 1 Application(s) Topical <User Schedule>  enoxaparin Injectable 40 milliGRAM(s) SubCutaneous daily  lactulose Syrup 20 Gram(s) Oral every 6 hours  levothyroxine 50 MICROGram(s) Oral daily  meropenem  IVPB 1000 milliGRAM(s) IV Intermittent every 8 hours  midodrine 10 milliGRAM(s) Oral three times a day  norepinephrine Infusion 0.05 MICROgram(s)/kG/Min (5.953 mL/Hr) IV Continuous <Continuous>  pantoprazole    Tablet 40 milliGRAM(s) Oral two times a day  sodium bicarbonate 650 milliGRAM(s) Oral three times a day  ursodiol Capsule 300 milliGRAM(s) Oral every 8 hours  vancomycin  IVPB 750 milliGRAM(s) IV Intermittent every 12 hours    MEDICATIONS  (PRN):  morphine  - Injectable 1 milliGRAM(s) IV Push every 6 hours PRN Severe Pain (7 - 10)      Allergies    No Known Allergies    Intolerances        FAMILY HISTORY:  Family history of diabetes mellitus (Sibling)  Family history of heart disease (Father)      SOCIAL    REVIEW OF SYSTEMS    Vital Signs Last 24 Hrs  T(C): 36.2 (16 Jan 2019 12:00), Max: 37.6 (15 [place] 2019 14:00)  T(F): 97.1 (16 Jan 2019 12:00), Max: 99.7 (15 [place] 2019 18:30)  HR: 70 (16 Jan 2019 12:00) (54 - 96)  BP: 88/59 (16 Jan 2019 12:00) (77/63 - 112/65)  BP(mean): 67 (16 Jan 2019 12:00) (58 - 86)  RR: 29 (16 Jan 2019 12:00) (18 - 29)  SpO2: 100% (16 Jan 2019 12:00) (95% - 100%)    GENERAL:  no distress  HEENT:  NC/AT,  anicteric  CHEST:   no increased effort, breath sounds clear  HEART:  Regular rhythm  ABDOMEN:  Soft, non-tender, non-distended, normoactive bowel sounds, +ve fluid shift  EXTEREMITIES:  no cyanosis      CBC Full  -  ( 16 Jan 2019 04:18 )  WBC Count : 12.98 K/uL   <- 3.34  Hemoglobin : 10.0 g/dL at baseline  Hematocrit : 33.2 %  Platelet Count - Automated : 56 K/uL - baseline  Mean Cell Volume : 90.2 fL  Mean Cell Hemoglobin : 27.2 pg  Mean Cell Hemoglobin Concentration : 30.1 g/dL  Auto Neutrophil # : x  Auto Lymphocyte # : x  Auto Monocyte # : x  Auto Eosinophil # : x  Auto Basophil # : x  Auto Neutrophil % : x  Auto Lymphocyte % : x  Auto Monocyte % : x  Auto Eosinophil % : x  Auto Basophil % : x      PT/INR - ( 15 [place] 2019 04:46 )   PT: 17.70 sec;   INR: 1.55 ratio         PTT - ( 15 [place] 2019 04:46 )  PTT:37.1 sec    01-16    139  |  106  |  21<H>  ----------------------------<  118<H>  3.7   |  14<L>  |  1.0    Ca    8.5      16 Jan 2019 04:18  Phos  3.5     01-16  Mg     2.0     01-16    TPro  9.3<H>  /  Alb  3.2<L>  /  TBili  1.5<H>  /  DBili  x   /  AST  58<H>  /  ALT  20  /  AlkPhos  122<H>  01-15    SAAG - 3.2 - 0.7 = 2.5  Total Protein Fluid: 2.4  Fluid neutrophils - 128  Fluid gram stain - negative        AMYLASE                  01-15 @ 04:46   --  LIPASE                   01-15 @ 04:46  109  HCG  --                    01-15 @ 04:46          RADIOLOGY    RUQ sono: Mod- large ascites  CTAP: Patent portal vein. cirrhotic liver. large ascites 58 year old FM w/ Pmhx of liver cirrhosis 2/2 PBC (2003) complicated by portal HTN (splenomegaly, portopulmonary HTN, varices, ascites) came to ER because of abdominal and chest pain since yesterday. As per son, patient was recently admitted almost 3 months back because of chest pain, was fully worked up, found to have large ascites which resolved with diuretics, she is pending transplantation list in [place] awaiting or cardiology clearanace. Since yesterday night she was complaining of chest pain mainly in epigastric area, non radiating associated with nausea and vomiting gradually worsened over time, since morning pain was worst was 5-6 /10, she has associated abdominal discomfort but denies any pain.she denies any hedache, no dizziness, no lower leg swelling, no urinary complaints.    ER: Tmax 103F. She was hypotensive, give given a total of 1 L iv BOLUS, was given stat IV vancomycin and cefepime. (15 [place] 2019 12:30). pt was admitted for a diagnosis of septic shock possibly 2/2 SBP.    EGD: (11/18): chronic superficial gastritis. Grade B esophageal varices w/ 4 bands    Colonoscopy (2017): wnl. no polyps removed. son unsure of prep      PAST MEDICAL & SURGICAL HISTORY:  Splenomegaly  Hypotension  GERD (gastroesophageal reflux disease)  Liver cirrhosis  History of appendectomy  History of tonsillectomy      Home Medications:   * Patient Currently Takes Medications as of 28-Dec-2018 11:35 documented in Structured Notes  · 	oxyCODONE 5 mg oral tablet: 1 tab(s) orally 1 to 4 times a day, As Needed -Severe Pain (7 - 10) MDD:4   · 	lactulose 10 g/15 mL oral syrup: 5 milliliter(s) orally 3 times a day MDD:titrate till 2-3 bowel movements / day  · 	lidocaine 5% topical film: Apply topically to affected area once a day MDD:apply for 12 hours on and 12 hours off  · 	midodrine 10 mg oral tablet: 1 tab(s) orally 3 times a day  · 	levothyroxine 50 mcg (0.05 mg) oral tablet: 1 tab(s) orally once a day  · 	pantoprazole 40 mg oral delayed release tablet: 1 tab(s) orally once a day (before a meal)  · 	furosemide 20 mg oral tablet: 1 tab(s) orally once a day at 12PM  · 	ursodiol 300 mg oral tablet: 1 tab(s) orally 3 times a day      MEDICATIONS  (STANDING):  chlorhexidine 4% Liquid 1 Application(s) Topical <User Schedule>  enoxaparin Injectable 40 milliGRAM(s) SubCutaneous daily  lactulose Syrup 20 Gram(s) Oral every 6 hours  levothyroxine 50 MICROGram(s) Oral daily  meropenem  IVPB 1000 milliGRAM(s) IV Intermittent every 8 hours  midodrine 10 milliGRAM(s) Oral three times a day  norepinephrine Infusion 0.05 MICROgram(s)/kG/Min (5.953 mL/Hr) IV Continuous <Continuous>  pantoprazole    Tablet 40 milliGRAM(s) Oral two times a day  sodium bicarbonate 650 milliGRAM(s) Oral three times a day  ursodiol Capsule 300 milliGRAM(s) Oral every 8 hours  vancomycin  IVPB 750 milliGRAM(s) IV Intermittent every 12 hours    MEDICATIONS  (PRN):  morphine  - Injectable 1 milliGRAM(s) IV Push every 6 hours PRN Severe Pain (7 - 10)      Allergies    No Known Allergies    Intolerances        FAMILY HISTORY:  Family history of diabetes mellitus (Sibling)  Family history of heart disease (Father)      SOCIAL    REVIEW OF SYSTEMS    Vital Signs Last 24 Hrs  T(C): 36.2 (16 Jan 2019 12:00), Max: 37.6 (15 [place] 2019 14:00)  T(F): 97.1 (16 Jan 2019 12:00), Max: 99.7 (15 [place] 2019 18:30)  HR: 70 (16 Jan 2019 12:00) (54 - 96)  BP: 88/59 (16 Jan 2019 12:00) (77/63 - 112/65)  BP(mean): 67 (16 Jan 2019 12:00) (58 - 86)  RR: 29 (16 Jan 2019 12:00) (18 - 29)  SpO2: 100% (16 Jan 2019 12:00) (95% - 100%)    GENERAL:  no distress  HEENT:  NC/AT,  anicteric  CHEST:   no increased effort, breath sounds clear  HEART:  Regular rhythm  ABDOMEN:  Soft, non-tender, non-distended, normoactive bowel sounds, +ve fluid shift  EXTEREMITIES:  no cyanosis      CBC Full  -  ( 16 Jan 2019 04:18 )  WBC Count : 12.98 K/uL   <- 3.34  Hemoglobin : 10.0 g/dL at baseline  Hematocrit : 33.2 %  Platelet Count - Automated : 56 K/uL - baseline  Mean Cell Volume : 90.2 fL  Mean Cell Hemoglobin : 27.2 pg  Mean Cell Hemoglobin Concentration : 30.1 g/dL  Auto Neutrophil # : x  Auto Lymphocyte # : x  Auto Monocyte # : x  Auto Eosinophil # : x  Auto Basophil # : x  Auto Neutrophil % : x  Auto Lymphocyte % : x  Auto Monocyte % : x  Auto Eosinophil % : x  Auto Basophil % : x      PT/INR - ( 15 [place] 2019 04:46 )   PT: 17.70 sec;   INR: 1.55 ratio         PTT - ( 15 [place] 2019 04:46 )  PTT:37.1 sec    01-16    139  |  106  |  21<H>  ----------------------------<  118<H>  3.7   |  14<L>  |  1.0    Ca    8.5      16 Jan 2019 04:18  Phos  3.5     01-16  Mg     2.0     01-16    TPro  9.3<H>  /  Alb  3.2<L>  /  TBili  1.5<H>  /  DBili  x   /  AST  58<H>  /  ALT  20  /  AlkPhos  122<H>  01-15    SAAG - 3.2 - 0.7 = 2.5  Total Protein Fluid: 2.4  Fluid neutrophils - 128  Fluid gram stain - negative        AMYLASE                  01-15 @ 04:46   --  LIPASE                   01-15 @ 04:46  109  HCG  --                    01-15 @ 04:46          RADIOLOGY    RUQ sono: Mod- large ascites  CTAP: Patent portal vein. cirrhotic liver. large ascites

## 2019-01-16 NOTE — PROGRESS NOTE ADULT - SUBJECTIVE AND OBJECTIVE BOX
OVERNIGHT EVENTS: still on pressors but lower, 0.17, no IVF    Vital Signs Last 24 Hrs  T(C): 35.6 (16 Jan 2019 04:00), Max: 39.7 (15 [place] 2019 10:00)  T(F): 96 (16 Jan 2019 04:00), Max: 103.4 (15 [place] 2019 10:00)  HR: 58 (16 Jan 2019 06:00) (56 - 111)  BP: 92/56 (16 Jan 2019 06:00) (74/47 - 109/69)  BP(mean): 68 (16 Jan 2019 06:00) (56 - 86)  RR: 22 (16 Jan 2019 06:00) (17 - 24)  SpO2: 98% (16 Jan 2019 06:00) (95% - 99%)    PHYSICAL EXAMINATION:    GENERAL: ILL LOOKING    HEENT: Head is normocephalic and atraumatic. Extraocular muscles are intact. Mucous membranes are moist.    NECK: Supple.    LUNGS: DEC BS BOTH BASE    HEART: Regular rate and rhythm without murmur.    ABDOMEN: DISTENDED    EXTREMITIES: Without any cyanosis, clubbing, rash, lesions or edema.    NEUROLOGIC: Grossly intact.    SKIN: No ulceration or induration present.      LABS:                        10.0   12.98 )-----------( 56       ( 16 Jan 2019 04:18 )             33.2     01-16    139  |  106  |  21<H>  ----------------------------<  118<H>  3.7   |  14<L>  |  1.0    Ca    8.5      16 Jan 2019 04:18  Phos  3.5     01-16  Mg     2.0     01-16    TPro  9.3<H>  /  Alb  3.2<L>  /  TBili  1.5<H>  /  DBili  x   /  AST  58<H>  /  ALT  20  /  AlkPhos  122<H>  01-15    PT/INR - ( 15 [place] 2019 04:46 )   PT: 17.70 sec;   INR: 1.55 ratio         PTT - ( 15 [place] 2019 04:46 )  PTT:37.1 sec      CARDIAC MARKERS ( 15 [place] 2019 20:40 )  x     / 0.03 ng/mL / x     / x     / x      CARDIAC MARKERS ( 15 [place] 2019 04:46 )  x     / <0.01 ng/mL / x     / x     / x              Lactate, Blood: 2.7 mmol/L (01-16-19 @ 04:18)  Lactate, Blood: 3.3 mmol/L (01-16-19 @ 01:02)  Lactate, Blood: 5.2 mmol/L (01-15-19 @ 16:47)          01-15-19 @ 07:01  -  01-16-19 @ 07:00  --------------------------------------------------------  IN: 838 mL / OUT: 0 mL / NET: 838 mL        MICROBIOLOGY:      MEDICATIONS  (STANDING):  cefepime   IVPB 2000 milliGRAM(s) IV Intermittent every 12 hours  enoxaparin Injectable 40 milliGRAM(s) SubCutaneous daily  lactulose Syrup 20 Gram(s) Oral every 6 hours  levothyroxine 50 MICROGram(s) Oral daily  midodrine 10 milliGRAM(s) Oral three times a day  norepinephrine Infusion 0.05 MICROgram(s)/kG/Min (5.953 mL/Hr) IV Continuous <Continuous>  pantoprazole    Tablet 40 milliGRAM(s) Oral two times a day  ursodiol Capsule 300 milliGRAM(s) Oral every 8 hours  vancomycin  IVPB 750 milliGRAM(s) IV Intermittent every 12 hours    MEDICATIONS  (PRN):  morphine  - Injectable 1 milliGRAM(s) IV Push every 6 hours PRN Severe Pain (7 - 10)      RADIOLOGY & ADDITIONAL STUDIES:

## 2019-01-16 NOTE — PROGRESS NOTE ADULT - ASSESSMENT
IMPRESSION:    Sepsis / Septic shock  RO paracentesis NO sbp  History of cirrhosis with ascites  Portopulmonary hypertension        PLAN:    CNS: No depressants     HEENT: Oral care    PULMONARY:  HOB @ 45 degrees, keep sao2 > 92%    CARDIOVASCULAR: 2 d echo, cardio eval    GI: GI prophylaxis.  GI evaluation. Feeding for now. RUQ sono    RENAL:  Follow up lytes.  Correct as needed. po bicarb    INFECTIOUS DISEASE: PAN cultures, start Meropenem and vancomycin, id    HEMATOLOGICAL:  DVT prophylaxis.    ENDOCRINE:  Follow up FS.     MUSCULOSKELETAL: out of bed as tolerated    keep ICU

## 2019-01-17 LAB
ALBUMIN SERPL ELPH-MCNC: 2.2 G/DL — LOW (ref 3.5–5.2)
ALP SERPL-CCNC: 92 U/L — SIGNIFICANT CHANGE UP (ref 30–115)
ALT FLD-CCNC: 16 U/L — SIGNIFICANT CHANGE UP (ref 0–41)
ANION GAP SERPL CALC-SCNC: 16 MMOL/L — HIGH (ref 7–14)
ANION GAP SERPL CALC-SCNC: 16 MMOL/L — HIGH (ref 7–14)
APPEARANCE UR: ABNORMAL
APTT BLD: 37.9 SEC — SIGNIFICANT CHANGE UP (ref 27–39.2)
AST SERPL-CCNC: 38 U/L — SIGNIFICANT CHANGE UP (ref 0–41)
BASE EXCESS BLDA CALC-SCNC: -4.3 MMOL/L — LOW (ref -2–2)
BASOPHILS # BLD AUTO: 0.01 K/UL — SIGNIFICANT CHANGE UP (ref 0–0.2)
BASOPHILS # BLD AUTO: 0.02 K/UL — SIGNIFICANT CHANGE UP (ref 0–0.2)
BASOPHILS NFR BLD AUTO: 0.3 % — SIGNIFICANT CHANGE UP (ref 0–1)
BASOPHILS NFR BLD AUTO: 0.4 % — SIGNIFICANT CHANGE UP (ref 0–1)
BILIRUB SERPL-MCNC: 1.4 MG/DL — HIGH (ref 0.2–1.2)
BILIRUB UR-MCNC: NEGATIVE — SIGNIFICANT CHANGE UP
BUN SERPL-MCNC: 13 MG/DL — SIGNIFICANT CHANGE UP (ref 10–20)
BUN SERPL-MCNC: 17 MG/DL — SIGNIFICANT CHANGE UP (ref 10–20)
CALCIUM SERPL-MCNC: 7.4 MG/DL — LOW (ref 8.5–10.1)
CALCIUM SERPL-MCNC: 7.8 MG/DL — LOW (ref 8.5–10.1)
CHLORIDE SERPL-SCNC: 103 MMOL/L — SIGNIFICANT CHANGE UP (ref 98–110)
CHLORIDE SERPL-SCNC: 105 MMOL/L — SIGNIFICANT CHANGE UP (ref 98–110)
CK MB CFR SERPL CALC: 1.6 NG/ML — SIGNIFICANT CHANGE UP (ref 0.6–6.3)
CO2 SERPL-SCNC: 17 MMOL/L — SIGNIFICANT CHANGE UP (ref 17–32)
CO2 SERPL-SCNC: 17 MMOL/L — SIGNIFICANT CHANGE UP (ref 17–32)
COLOR SPEC: SIGNIFICANT CHANGE UP
CREAT SERPL-MCNC: 0.7 MG/DL — SIGNIFICANT CHANGE UP (ref 0.7–1.5)
CREAT SERPL-MCNC: 0.7 MG/DL — SIGNIFICANT CHANGE UP (ref 0.7–1.5)
CULTURE RESULTS: NO GROWTH — SIGNIFICANT CHANGE UP
DIFF PNL FLD: NEGATIVE — SIGNIFICANT CHANGE UP
EOSINOPHIL # BLD AUTO: 0.1 K/UL — SIGNIFICANT CHANGE UP (ref 0–0.7)
EOSINOPHIL # BLD AUTO: 0.12 K/UL — SIGNIFICANT CHANGE UP (ref 0–0.7)
EOSINOPHIL NFR BLD AUTO: 2.5 % — SIGNIFICANT CHANGE UP (ref 0–8)
EOSINOPHIL NFR BLD AUTO: 3.2 % — SIGNIFICANT CHANGE UP (ref 0–8)
GLUCOSE SERPL-MCNC: 106 MG/DL — HIGH (ref 70–99)
GLUCOSE SERPL-MCNC: 88 MG/DL — SIGNIFICANT CHANGE UP (ref 70–99)
GLUCOSE UR QL: NEGATIVE MG/DL — SIGNIFICANT CHANGE UP
GRAM STN FLD: SIGNIFICANT CHANGE UP
HAEM INFLU DNA BLD POS QL NAA+NON-PROBE: SIGNIFICANT CHANGE UP
HCO3 BLDA-SCNC: 18 MMOL/L — LOW (ref 23–27)
HCT VFR BLD CALC: 25.3 % — LOW (ref 37–47)
HCT VFR BLD CALC: 28.1 % — LOW (ref 37–47)
HGB BLD-MCNC: 7.7 G/DL — LOW (ref 12–16)
HGB BLD-MCNC: 8.6 G/DL — LOW (ref 12–16)
IMM GRANULOCYTES NFR BLD AUTO: 0.3 % — SIGNIFICANT CHANGE UP (ref 0.1–0.3)
IMM GRANULOCYTES NFR BLD AUTO: 0.4 % — HIGH (ref 0.1–0.3)
INR BLD: 1.91 RATIO — HIGH (ref 0.65–1.3)
KETONES UR-MCNC: NEGATIVE — SIGNIFICANT CHANGE UP
LEUKOCYTE ESTERASE UR-ACNC: NEGATIVE — SIGNIFICANT CHANGE UP
LYMPHOCYTES # BLD AUTO: 0.7 K/UL — LOW (ref 1.2–3.4)
LYMPHOCYTES # BLD AUTO: 0.85 K/UL — LOW (ref 1.2–3.4)
LYMPHOCYTES # BLD AUTO: 17.6 % — LOW (ref 20.5–51.1)
LYMPHOCYTES # BLD AUTO: 22.7 % — SIGNIFICANT CHANGE UP (ref 20.5–51.1)
MAGNESIUM SERPL-MCNC: 1.7 MG/DL — LOW (ref 1.8–2.4)
MCHC RBC-ENTMCNC: 27.6 PG — SIGNIFICANT CHANGE UP (ref 27–31)
MCHC RBC-ENTMCNC: 27.9 PG — SIGNIFICANT CHANGE UP (ref 27–31)
MCHC RBC-ENTMCNC: 30.4 G/DL — LOW (ref 32–37)
MCHC RBC-ENTMCNC: 30.6 G/DL — LOW (ref 32–37)
MCV RBC AUTO: 90.1 FL — SIGNIFICANT CHANGE UP (ref 81–99)
MCV RBC AUTO: 91.7 FL — SIGNIFICANT CHANGE UP (ref 81–99)
METHOD TYPE: SIGNIFICANT CHANGE UP
MONOCYTES # BLD AUTO: 0.31 K/UL — SIGNIFICANT CHANGE UP (ref 0.1–0.6)
MONOCYTES # BLD AUTO: 0.47 K/UL — SIGNIFICANT CHANGE UP (ref 0.1–0.6)
MONOCYTES NFR BLD AUTO: 10.1 % — HIGH (ref 1.7–9.3)
MONOCYTES NFR BLD AUTO: 9.7 % — HIGH (ref 1.7–9.3)
NEUTROPHILS # BLD AUTO: 1.95 K/UL — SIGNIFICANT CHANGE UP (ref 1.4–6.5)
NEUTROPHILS # BLD AUTO: 3.35 K/UL — SIGNIFICANT CHANGE UP (ref 1.4–6.5)
NEUTROPHILS NFR BLD AUTO: 63.4 % — SIGNIFICANT CHANGE UP (ref 42.2–75.2)
NEUTROPHILS NFR BLD AUTO: 69.4 % — SIGNIFICANT CHANGE UP (ref 42.2–75.2)
NITRITE UR-MCNC: NEGATIVE — SIGNIFICANT CHANGE UP
NRBC # BLD: 0 /100 WBCS — SIGNIFICANT CHANGE UP (ref 0–0)
NRBC # BLD: 0 /100 WBCS — SIGNIFICANT CHANGE UP (ref 0–0)
PCO2 BLDA: 23 MMHG — LOW (ref 38–42)
PH BLDA: 7.5 — HIGH (ref 7.38–7.42)
PH UR: 6 — SIGNIFICANT CHANGE UP (ref 5–8)
PLATELET # BLD AUTO: 37 K/UL — LOW (ref 130–400)
PLATELET # BLD AUTO: 40 K/UL — LOW (ref 130–400)
PO2 BLDA: 97 MMHG — HIGH (ref 78–95)
POTASSIUM SERPL-MCNC: 3.3 MMOL/L — LOW (ref 3.5–5)
POTASSIUM SERPL-MCNC: 3.9 MMOL/L — SIGNIFICANT CHANGE UP (ref 3.5–5)
POTASSIUM SERPL-SCNC: 3.3 MMOL/L — LOW (ref 3.5–5)
POTASSIUM SERPL-SCNC: 3.9 MMOL/L — SIGNIFICANT CHANGE UP (ref 3.5–5)
PROT SERPL-MCNC: 7 G/DL — SIGNIFICANT CHANGE UP (ref 6–8)
PROT UR-MCNC: NEGATIVE MG/DL — SIGNIFICANT CHANGE UP
PROTHROM AB SERPL-ACNC: 21.8 SEC — HIGH (ref 9.95–12.87)
RBC # BLD: 2.76 M/UL — LOW (ref 4.2–5.4)
RBC # BLD: 3.12 M/UL — LOW (ref 4.2–5.4)
RBC # FLD: 21 % — HIGH (ref 11.5–14.5)
RBC # FLD: 21.3 % — HIGH (ref 11.5–14.5)
SAO2 % BLDA: 99 % — HIGH (ref 94–98)
SODIUM SERPL-SCNC: 136 MMOL/L — SIGNIFICANT CHANGE UP (ref 135–146)
SODIUM SERPL-SCNC: 138 MMOL/L — SIGNIFICANT CHANGE UP (ref 135–146)
SP GR SPEC: 1.02 — SIGNIFICANT CHANGE UP (ref 1.01–1.03)
SPECIMEN SOURCE: SIGNIFICANT CHANGE UP
TROPONIN T SERPL-MCNC: <0.01 NG/ML — SIGNIFICANT CHANGE UP
TSH SERPL-MCNC: 4.61 UIU/ML — HIGH (ref 0.27–4.2)
UROBILINOGEN FLD QL: 0.2 MG/DL — SIGNIFICANT CHANGE UP (ref 0.2–0.2)
WBC # BLD: 3.08 K/UL — LOW (ref 4.8–10.8)
WBC # BLD: 4.83 K/UL — SIGNIFICANT CHANGE UP (ref 4.8–10.8)
WBC # FLD AUTO: 3.08 K/UL — LOW (ref 4.8–10.8)
WBC # FLD AUTO: 4.83 K/UL — SIGNIFICANT CHANGE UP (ref 4.8–10.8)

## 2019-01-17 RX ORDER — CEFTRIAXONE 500 MG/1
2 INJECTION, POWDER, FOR SOLUTION INTRAMUSCULAR; INTRAVENOUS EVERY 24 HOURS
Qty: 0 | Refills: 0 | Status: DISCONTINUED | OUTPATIENT
Start: 2019-01-17 | End: 2019-01-25

## 2019-01-17 RX ORDER — CEFTRIAXONE 500 MG/1
2 INJECTION, POWDER, FOR SOLUTION INTRAMUSCULAR; INTRAVENOUS EVERY 24 HOURS
Qty: 0 | Refills: 0 | Status: DISCONTINUED | OUTPATIENT
Start: 2019-01-17 | End: 2019-01-17

## 2019-01-17 RX ORDER — SODIUM CHLORIDE 9 MG/ML
500 INJECTION, SOLUTION INTRAVENOUS ONCE
Qty: 0 | Refills: 0 | Status: COMPLETED | OUTPATIENT
Start: 2019-01-17 | End: 2019-01-17

## 2019-01-17 RX ORDER — POTASSIUM CHLORIDE 20 MEQ
40 PACKET (EA) ORAL EVERY 4 HOURS
Qty: 0 | Refills: 0 | Status: COMPLETED | OUTPATIENT
Start: 2019-01-17 | End: 2019-01-17

## 2019-01-17 RX ORDER — MEROPENEM 1 G/30ML
1000 INJECTION INTRAVENOUS EVERY 8 HOURS
Qty: 0 | Refills: 0 | Status: DISCONTINUED | OUTPATIENT
Start: 2019-01-17 | End: 2019-01-17

## 2019-01-17 RX ORDER — MAGNESIUM SULFATE 500 MG/ML
2 VIAL (ML) INJECTION ONCE
Qty: 0 | Refills: 0 | Status: COMPLETED | OUTPATIENT
Start: 2019-01-17 | End: 2019-01-17

## 2019-01-17 RX ADMIN — Medication 40 MILLIEQUIVALENT(S): at 14:42

## 2019-01-17 RX ADMIN — URSODIOL 300 MILLIGRAM(S): 250 TABLET, FILM COATED ORAL at 06:43

## 2019-01-17 RX ADMIN — MIDODRINE HYDROCHLORIDE 10 MILLIGRAM(S): 2.5 TABLET ORAL at 22:02

## 2019-01-17 RX ADMIN — Medication 250 MILLIGRAM(S): at 06:45

## 2019-01-17 RX ADMIN — ENOXAPARIN SODIUM 40 MILLIGRAM(S): 100 INJECTION SUBCUTANEOUS at 14:39

## 2019-01-17 RX ADMIN — CHLORHEXIDINE GLUCONATE 1 APPLICATION(S): 213 SOLUTION TOPICAL at 10:53

## 2019-01-17 RX ADMIN — LACTULOSE 20 GRAM(S): 10 SOLUTION ORAL at 06:44

## 2019-01-17 RX ADMIN — MIDODRINE HYDROCHLORIDE 10 MILLIGRAM(S): 2.5 TABLET ORAL at 06:44

## 2019-01-17 RX ADMIN — Medication 50 GRAM(S): at 09:02

## 2019-01-17 RX ADMIN — MEROPENEM 100 MILLIGRAM(S): 1 INJECTION INTRAVENOUS at 06:45

## 2019-01-17 RX ADMIN — URSODIOL 300 MILLIGRAM(S): 250 TABLET, FILM COATED ORAL at 22:02

## 2019-01-17 RX ADMIN — Medication 50 MICROGRAM(S): at 06:43

## 2019-01-17 RX ADMIN — LACTULOSE 20 GRAM(S): 10 SOLUTION ORAL at 17:01

## 2019-01-17 RX ADMIN — MIDODRINE HYDROCHLORIDE 10 MILLIGRAM(S): 2.5 TABLET ORAL at 14:39

## 2019-01-17 RX ADMIN — CEFTRIAXONE 100 GRAM(S): 500 INJECTION, POWDER, FOR SOLUTION INTRAMUSCULAR; INTRAVENOUS at 12:00

## 2019-01-17 RX ADMIN — PANTOPRAZOLE SODIUM 40 MILLIGRAM(S): 20 TABLET, DELAYED RELEASE ORAL at 06:43

## 2019-01-17 RX ADMIN — SODIUM CHLORIDE 1000 MILLILITER(S): 9 INJECTION, SOLUTION INTRAVENOUS at 14:00

## 2019-01-17 RX ADMIN — PANTOPRAZOLE SODIUM 40 MILLIGRAM(S): 20 TABLET, DELAYED RELEASE ORAL at 17:03

## 2019-01-17 RX ADMIN — Medication 40 MILLIEQUIVALENT(S): at 10:51

## 2019-01-17 RX ADMIN — URSODIOL 300 MILLIGRAM(S): 250 TABLET, FILM COATED ORAL at 14:42

## 2019-01-17 RX ADMIN — Medication 650 MILLIGRAM(S): at 06:43

## 2019-01-17 NOTE — CONSULT NOTE ADULT - ASSESSMENT
58 years old female pt with past medical hx of liver cirrhosis (secondary to primary biliary cirrhosis) complicated with ascites and portopulmonary HTN, GERD, hypotension, came to ER because of abdominal and chest pain, admitted for acute decompensated cirrhosis s/p paracentesis, sepsis secondary to bacteremia. Currently on IV antibiotics and pressors  Nephrology was consulted for HAGMA.     #HAGMA secondary to sepsis (lactic acidosis)  - initial venous blood gas values obtained two days ago  - Obtain condominant ABG and BMP  - Consider d/c Sodium bicarbonate  - Strict Is and Os      will follow

## 2019-01-17 NOTE — CONSULT NOTE ADULT - SUBJECTIVE AND OBJECTIVE BOX
NEPHROLOGY CONSULTATION NOTE    58 years old female pt with past medical hx of liver cirrhosis (secondary to primary biliary cirrhosis) complicated with ascites and portopulmonary HTN came to ER because of abdominal and chest pain since 01/15/19.  As per son, patient was recently admitted almost 3 months back because of chest pain, was fully worked up, found to have large ascites which resolved with diuretics, she is on active transplant list in [place] awaiting or cardiology clearance  Since yesterday night she was complaining of chest pain mainly in epigastric area, non radiating associated with nausea and vomiting gradually worsened over time, associated with recorded fever of 103.  Patient is being treated for sepsis (g- bacteriemia)/ septic shock, liver cirrhosis s/p paracentesis,  portopulmonary,  hypertension.    Nephrology was consulted for evaluation of metabolic acidosis with anion gap 1/15. Patient's BMP repeated today w/o ABG, but continues to have anion gap. Patient remains hypotensive on pressors. Adequate urine output    PAST MEDICAL & SURGICAL HISTORY:  Splenomegaly  Hypotension  GERD (gastroesophageal reflux disease)  Liver cirrhosis  History of appendectomy  History of tonsillectomy    Allergies:  No Known Allergies    Home Medications Reviewed  Hospital Medications:   MEDICATIONS  (STANDING):  cefTRIAXone   IVPB 2 Gram(s) IV Intermittent every 24 hours  chlorhexidine 4% Liquid 1 Application(s) Topical <User Schedule>  enoxaparin Injectable 40 milliGRAM(s) SubCutaneous daily  lactated ringers Bolus 500 milliLiter(s) IV Bolus once  lactulose Syrup 20 Gram(s) Oral every 6 hours  levothyroxine 50 MICROGram(s) Oral daily  midodrine 10 milliGRAM(s) Oral three times a day  norepinephrine Infusion 0.05 MICROgram(s)/kG/Min (5.953 mL/Hr) IV Continuous <Continuous>  pantoprazole    Tablet 40 milliGRAM(s) Oral two times a day  potassium chloride    Tablet ER 40 milliEquivalent(s) Oral every 4 hours  sodium bicarbonate 650 milliGRAM(s) Oral three times a day  ursodiol Capsule 300 milliGRAM(s) Oral every 8 hours      SOCIAL HISTORY:  Denies ETOH,Smoking,   FAMILY HISTORY:  Family history of diabetes mellitus (Sibling)  Family history of heart disease (Father)        REVIEW OF SYSTEMS:  CONSTITUTIONAL: No weakness, fevers or chills  EYES/ENT: No visual changes;  No vertigo or throat pain   NECK: No pain or stiffness  RESPIRATORY: No cough, wheezing, hemoptysis; No shortness of breath  CARDIOVASCULAR: No chest pain or palpitations.  GASTROINTESTINAL: mild reduced abdominal pain.  No nausea, vomiting, or hematemesis; No diarrhea or constipation. No melena or hematochezia.  GENITOURINARY: No dysuria, frequency, foamy urine, urinary urgency, incontinence or hematuria  NEUROLOGICAL: No numbness or weakness  SKIN: No itching, burning, rashes, or lesions   VASCULAR: No bilateral lower extremity edema.   All other review of systems is negative unless indicated above.    VITALS:  T(F): 97.7 (01-17-19 @ 08:00), Max: 97.7 (01-17-19 @ 00:00)  HR: 66 (01-17-19 @ 10:00)  BP: 78/58 (01-17-19 @ 10:00)  RR: 24 (01-17-19 @ 10:00)  SpO2: 99% (01-17-19 @ 10:00)    01-16 @ 07:01 - 01-17 @ 07:00  --------------------------------------------------------  IN: 2824 mL / OUT: 1000 mL / NET: 1824 mL    01-17 @ 07:01 - 01-17 @ 13:37  --------------------------------------------------------  IN: 320 mL / OUT: 400 mL / NET: -80 mL            I&O's Detail    16 Jan 2019 07:01  -  17 Jan 2019 07:00  --------------------------------------------------------  IN:    IV PiggyBack: 300 mL    norepinephrine Infusion: 264 mL    Oral Fluid: 2260 mL  Total IN: 2824 mL    OUT:    Voided: 1000 mL  Total OUT: 1000 mL    Total NET: 1824 mL      17 Jan 2019 07:01  -  17 Jan 2019 13:37  --------------------------------------------------------  IN:    IV PiggyBack: 50 mL    norepinephrine Infusion: 30 mL    Oral Fluid: 240 mL  Total IN: 320 mL    OUT:    Voided: 400 mL  Total OUT: 400 mL    Total NET: -80 mL            PHYSICAL EXAM:  Constitutional: NAD  HEENT: anicteric sclera, oropharynx clear, MMM  Neck: No JVD  Respiratory: CTAB, no wheezes, rales or rhonchi  Cardiovascular: S1, S2, RRR  Gastrointestinal: BS+, soft, NT/ND  Extremities: No cyanosis or clubbing. No peripheral edema  Neurological: A/O x 3, no focal deficits  Psychiatric: Normal mood, normal affect  : No CVA tenderness. No pacheco.   Skin: No rashes  Vascular Access:    LABS:  01-17    138  |  105  |  17  ----------------------------<  106<H>  3.3<L>   |  17  |  0.7    Ca    7.8<L>      17 Jan 2019 04:36  Phos  3.5     01-16  Mg     1.7     01-17    TPro  7.0  /  Alb  2.2<L>  /  TBili  1.4<H>  /  DBili      /  AST  38  /  ALT  16  /  AlkPhos  92  01-17    Creatinine Trend: 0.7 <--, 1.0 <--, 0.8 <--, 0.8 <--, 0.8 <--, 0.8 <--, 0.8 <--, 0.9 <--, 0.7 <--, 0.7 <--, 0.8 <--                        8.6    4.83  )-----------( 40       ( 17 Jan 2019 04:36 )             28.1     Urine Studies:  Urinalysis Basic - ( 16 Jan 2019 09:50 )    Color: Orange / Appearance: Clear / SG: >=1.030 / pH:   Gluc:  / Ketone: Trace  / Bili: Moderate / Urobili: 1.0   Blood:  / Protein: 30 / Nitrite: Negative   Leuk Esterase: Trace / RBC: 1-2 /HPF / WBC 3-5 /HPF   Sq Epi:  / Non Sq Epi: Occasional /HPF / Bacteria:       Blood Gas Venous - Lactate (01.15.19 @ 09:19)    Blood Gas Venous - Lactate: 4.2 mmoL/L    Blood lactate: 5.2-->3.3-->2.7      RADIOLOGY & ADDITIONAL STUDIES:    Impression:      Low lungvolumes with bibasilar atelectasis.    Blood Gas Venous - Hemoglobin/Hematocrit (01.15.19 @ 09:19)    Total Hemoglobin, Calculated: 14.1 g/dL    Hematocrit, Calculated: 43.1 %    HCO3, Venous: 20 mmoL/L (01.15.19 @ 07:22)  pCO2, Venous: 26 mmHg (01.15.19 @ 09:19)

## 2019-01-17 NOTE — CONSULT NOTE ADULT - ATTENDING COMMENTS
Pt seen and examined  AG on BMP   likely due to lactate  Last pH on blood gas (VBG) was slightly alkalemic.  without blood gas, cant say if low bicarb on BMP is MEt acidosis vs compensation for resp alk (particularly in setting of cirrhosis.    Would check ABG w/ concomitant BMP to help elucidate acid/base status     discussed w/ CCU team

## 2019-01-17 NOTE — PROGRESS NOTE ADULT - SUBJECTIVE AND OBJECTIVE BOX
OVERNIGHT EVENTS: [place], ON PRESSORS 0.059 LEVOPHED, G NEG BLOOD    Vital Signs Last 24 Hrs  T(C): 36.3 (17 Jan 2019 04:00), Max: 36.5 (17 Jan 2019 00:00)  T(F): 97.4 (17 Jan 2019 04:00), Max: 97.7 (17 Jan 2019 00:00)  HR: 64 (17 Jan 2019 07:00) (58 - 76)  BP: 83/56 (17 Jan 2019 07:00) (77/63 - 100/61)  BP(mean): 64 (17 Jan 2019 07:00) (64 - 77)  RR: 24 (17 Jan 2019 07:00) (18 - 29)  SpO2: 98% (17 Jan 2019 07:00) (95% - 100%)    PHYSICAL EXAMINATION:    GENERAL: AXOX3, BETTER    HEENT: Head is normocephalic and atraumatic. Extraocular muscles are intact. Mucous membranes are moist.    NECK: Supple.    LUNGS: Clear to auscultation without wheezing, rales or rhonchi; respirations unlabored    HEART: Regular rate and rhythm without murmur.    ABDOMEN: NO RUQ TENDERNESS    EXTREMITIES: Without any cyanosis, clubbing, rash, lesions or edema.    NEUROLOGIC: Grossly intact.    SKIN: No ulceration or induration present.      LABS:                        8.6    4.83  )-----------( 40       ( 17 Jan 2019 04:36 )             28.1     01-17    138  |  105  |  17  ----------------------------<  106<H>  3.3<L>   |  17  |  0.7    Ca    7.8<L>      17 Jan 2019 04:36  Phos  3.5     01-16  Mg     1.7     01-17    TPro  7.0  /  Alb  2.2<L>  /  TBili  1.4<H>  /  DBili  x   /  AST  38  /  ALT  16  /  AlkPhos  92  01-17    PT/INR - ( 17 Jan 2019 04:36 )   PT: 21.80 sec;   INR: 1.91 ratio         PTT - ( 17 Jan 2019 04:36 )  PTT:37.9 sec  Urinalysis Basic - ( 16 Jan 2019 09:50 )    Color: Orange / Appearance: Clear / SG: >=1.030 / pH: x  Gluc: x / Ketone: Trace  / Bili: Moderate / Urobili: 1.0   Blood: x / Protein: 30 / Nitrite: Negative   Leuk Esterase: Trace / RBC: 1-2 /HPF / WBC 3-5 /HPF   Sq Epi: x / Non Sq Epi: Occasional /HPF / Bacteria: x        CARDIAC MARKERS ( 17 Jan 2019 04:36 )  x     / <0.01 ng/mL / x     / x     / 1.6 ng/mL  CARDIAC MARKERS ( 16 Jan 2019 16:45 )  x     / 0.01 ng/mL / x     / x     / 2.5 ng/mL  CARDIAC MARKERS ( 15 [place] 2019 20:40 )  x     / 0.03 ng/mL / x     / x     / x                      01-16-19 @ 07:01  -  01-17-19 @ 07:00  --------------------------------------------------------  IN: 2816 mL / OUT: 1000 mL / NET: 1816 mL        MICROBIOLOGY:  Culture Results:   No growth to date. (01-15 @ 16:47)  Culture Results:   No growth (01-15 @ 12:56)  Culture Results:   Growth in aerobic bottle:  Gram Negative Rods  "Due to technical problems, Proteus sp. will Not be reported as part of  the BCID panel until further notice"  ***Blood Panel PCR results on this specimen are available  approximately 3 hours after theGram stain result.***  Gram stain, PCR, and/or culture results may not always  correspond due to difference in methodologies.  ************************************************************  This PCR assay was performed using [employer].  The following targets are tested for: Enterococcus,  vancomycin resistant enterococci, Listeria monocytogenes,  coagulase negative staphylococci, S. aureus,  methicillin resistant S. aureus, Streptococcus agalactiae  (Group B), S. pneumoniae, S. pyogenes (Group A),  Acinetobacter baumannii, Enterobacter cloacae, E. coli,  Klebsiella oxytoca, K. pneumoniae, Proteus sp.,  Serratia marcescens, Haemophilus influenzae,  Neisseria meningitidis, Pseudomonas aeruginosa, Candida  albicans, C. glabrata, C krusei,C parapsilosis,  C. tropicalis and the KPC resistance gene. (01-15 @ 07:41)  Culture Results:   No growth to date. (01-15 @ 07:41)      MEDICATIONS  (STANDING):  chlorhexidine 4% Liquid 1 Application(s) Topical <User Schedule>  enoxaparin Injectable 40 milliGRAM(s) SubCutaneous daily  lactulose Syrup 20 Gram(s) Oral every 6 hours  levothyroxine 50 MICROGram(s) Oral daily  magnesium sulfate  IVPB 2 Gram(s) IV Intermittent once  meropenem  IVPB 1000 milliGRAM(s) IV Intermittent every 8 hours  midodrine 10 milliGRAM(s) Oral three times a day  norepinephrine Infusion 0.05 MICROgram(s)/kG/Min (5.953 mL/Hr) IV Continuous <Continuous>  pantoprazole    Tablet 40 milliGRAM(s) Oral two times a day  potassium chloride    Tablet ER 40 milliEquivalent(s) Oral every 4 hours  sodium bicarbonate 650 milliGRAM(s) Oral three times a day  ursodiol Capsule 300 milliGRAM(s) Oral every 8 hours    MEDICATIONS  (PRN):  morphine  - Injectable 1 milliGRAM(s) IV Push every 6 hours PRN Severe Pain (7 - 10)      RADIOLOGY & ADDITIONAL STUDIES:

## 2019-01-17 NOTE — CONSULT NOTE ADULT - ASSESSMENT
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    IMPRESSION:  Decompensated liver cirrhosis on transplant list  Sepsis secondary to SBP secondary to GNR  BCx GNRs   No PNA  No pyelonephritis  No PE    RECOMMENDATIONS:  F/u BCx  Meropenem 1 gm iv q8h

## 2019-01-17 NOTE — PROGRESS NOTE ADULT - SUBJECTIVE AND OBJECTIVE BOX
SUBJECTIVE:    Patient is a 58y old Female who presents with a chief complaint of abdominal pain for 1.5 days (17 Jan 2019 08:44)    Currently admitted to medicine with the primary diagnosis of Bacteremia      Today is hospital day 2d. Patient is asymptomatic this AM, abdominal pain resolved. Abdomen is soft and minimally distended but non-TTP. Blood culture positive for H. influenzae.     PAST MEDICAL & SURGICAL HISTORY  Splenomegaly  Hypotension  GERD (gastroesophageal reflux disease)  Liver cirrhosis  History of appendectomy  History of tonsillectomy    SOCIAL HISTORY:  Denies alcohol and illicit drug use  Former smoker, quit 2 months ago, 3-4 cigarettes for 35 years     From (X ) home ( ) nursing home ( ) other   Ambulation status: (X ) ambulates independently ( ) ambulates with assistance ( ) ambulates with device ( ) dependent   Device: ( ) rolling walker ( ) cane     ALLERGIES:  No Known Allergies    MEDICATIONS:  STANDING MEDICATIONS  cefTRIAXone   IVPB 2 Gram(s) IV Intermittent every 24 hours  chlorhexidine 4% Liquid 1 Application(s) Topical <User Schedule>  enoxaparin Injectable 40 milliGRAM(s) SubCutaneous daily  lactated ringers Bolus 500 milliLiter(s) IV Bolus once  lactulose Syrup 20 Gram(s) Oral every 6 hours  levothyroxine 50 MICROGram(s) Oral daily  midodrine 10 milliGRAM(s) Oral three times a day  norepinephrine Infusion 0.05 MICROgram(s)/kG/Min IV Continuous <Continuous>  pantoprazole    Tablet 40 milliGRAM(s) Oral two times a day  potassium chloride    Tablet ER 40 milliEquivalent(s) Oral every 4 hours  sodium bicarbonate 650 milliGRAM(s) Oral three times a day  ursodiol Capsule 300 milliGRAM(s) Oral every 8 hours    PRN MEDICATIONS  morphine  - Injectable 1 milliGRAM(s) IV Push every 6 hours PRN    VITALS:   T(F): 97.7  HR: 66  BP: 78/58  RR: 24  SpO2: 99%    LABS:                        8.6    4.83  )-----------( 40       ( 17 Jan 2019 04:36 )             28.1     01-17    138  |  105  |  17  ----------------------------<  106<H>  3.3<L>   |  17  |  0.7    Ca    7.8<L>      17 Jan 2019 04:36  Phos  3.5     01-16  Mg     1.7     01-17    TPro  7.0  /  Alb  2.2<L>  /  TBili  1.4<H>  /  DBili  x   /  AST  38  /  ALT  16  /  AlkPhos  92  01-17    PT/INR - ( 17 Jan 2019 04:36 )   PT: 21.80 sec;   INR: 1.91 ratio         PTT - ( 17 Jan 2019 04:36 )  PTT:37.9 sec  Urinalysis Basic - ( 16 Jan 2019 09:50 )    Color: Orange / Appearance: Clear / SG: >=1.030 / pH: x  Gluc: x / Ketone: Trace  / Bili: Moderate / Urobili: 1.0   Blood: x / Protein: 30 / Nitrite: Negative   Leuk Esterase: Trace / RBC: 1-2 /HPF / WBC 3-5 /HPF   Sq Epi: x / Non Sq Epi: Occasional /HPF / Bacteria: x    Troponin T, Serum: <0.01 ng/mL (01-17-19 @ 04:36)  Troponin T, Serum: 0.01 ng/mL (01-16-19 @ 16:45)    Culture - Blood (collected 15 [place] 2019 16:47)  Source: .Blood None  Preliminary Report (16 Jan 2019 23:01):    No growth to date.    Culture - Body Fluid with Gram Stain (collected 15 [place] 2019 12:56)  Source: .Body Fluid Peritoneal Fluid  Gram Stain (16 Jan 2019 03:00):    polymorphonuclear leukocytes seen    No organisms seen    by cytocentrifuge  Preliminary Report (16 Jan 2019 18:43):    No growth    Culture - Blood (collected 15 [place] 2019 07:41)  Source: .Blood Blood-Peripheral  Preliminary Report (16 Jan 2019 14:01):    No growth to date.    Culture - Blood (collected 15 [place] 2019 07:41)  Source: .Blood Blood-Peripheral  Gram Stain (17 Jan 2019 07:15):    Growth in aerobic bottle:    Gram Negative Rods  Preliminary Report (17 Jan 2019 07:15):    Growth in aerobic bottle:    Gram Negative Rods    "Due to technical problems, Proteus sp. will Not be reported as part of    the BCID panel until further notice"    ***Blood Panel PCR results on this specimen are available    approximately 3 hours after theGram stain result.***    Gram stain, PCR, and/or culture results may not always    correspond due to difference in methodologies.    ************************************************************    This PCR assay was performed using [employer].    The following targets are tested for: Enterococcus,    vancomycin resistant enterococci, Listeria monocytogenes,    coagulase negative staphylococci, S. aureus,    methicillin resistant S. aureus, Streptococcus agalactiae    (Group B), S. pneumoniae, S. pyogenes (Group A),    Acinetobacter baumannii, Enterobacter cloacae, E. coli,    Klebsiella oxytoca, K. pneumoniae, Proteus sp.,    Serratia marcescens, Haemophilus influenzae,    Neisseria meningitidis, Pseudomonas aeruginosa, Candida    albicans, C. glabrata, C krusei,C parapsilosis,    C. tropicalis and the KPC resistance gene.  Organism: Blood Culture PCR (17 Jan 2019 08:35)  Organism: Blood Culture PCR (17 Jan 2019 08:35)    CARDIAC MARKERS ( 17 Jan 2019 04:36 )  x     / <0.01 ng/mL / x     / x     / 1.6 ng/mL  CARDIAC MARKERS ( 16 Jan 2019 16:45 )  x     / 0.01 ng/mL / x     / x     / 2.5 ng/mL  CARDIAC MARKERS ( 15 [place] 2019 20:40 )  x     / 0.03 ng/mL / x     / x     / x        RADIOLOGY:  CXR      PHYSICAL EXAM:  GEN: No acute distress, lying comfortably in bed   LUNGS: Clear to auscultation bilaterally, no labored breathing   HEART: S1/S2 present. RRR.   ABD: Soft, non-tender,minimally distended. Bowel sounds present.   EXT: Noncyanotic, nonedematous, 2+ peripheral pulses, skin intact. No asterixis.   NEURO: AAOX3, CN II-XII grossly intact     Lines/Tubes   Nasal cannula   Peripheral IV access

## 2019-01-17 NOTE — CONSULT NOTE ADULT - SUBJECTIVE AND OBJECTIVE BOX
LUCI[place]OO  58y, Female  Allergy: No Known Allergies      HPI:  58 years old female pt with past medical hx of liver cirrhosis (secondary to primary biliary cirrhosis) complicated with ascites and portopulmonary HTN came to ER because of abdominal and chest pain since yesterday.  As per son, patient was recently admitted almost 3 months back because of chest pain, was fully worked up, found to have large ascites which resolved with diuretics, she is on active transplant list in [place] awaiting or cardiology clearanace.  Since yesterday night she was complaining of chest pain mainly in epigastric area, non radiating associated with nausea and vomiting gradually worsened over time, since morning pain was worst was 5-6 /10, she has associated abdominal discomfort but denies any pain.  she had 1 episode of fever 103 in ER.  she denies any hedache, no dizziness, no lower leg swelling, no urinary complaints.  In ER she was hypotensive, give given a total of 1 L iv BOLUS, was given stat IV vancomycin and cefepime. (15 [place] 2019 12:30)    FAMILY HISTORY:  Family history of diabetes mellitus (Sibling)  Family history of heart disease (Father)    PAST MEDICAL & SURGICAL HISTORY:  Splenomegaly  Hypotension  GERD (gastroesophageal reflux disease)  Liver cirrhosis  History of appendectomy  History of tonsillectomy        VITALS:  T(F): 97.4, Max: 97.7 (01-17-19 @ 00:00)  HR: 62  BP: 91/61  RR: 22Vital Signs Last 24 Hrs  T(C): 36.3 (17 Jan 2019 04:00), Max: 36.5 (17 Jan 2019 00:00)  T(F): 97.4 (17 Jan 2019 04:00), Max: 97.7 (17 Jan 2019 00:00)  HR: 62 (17 Jan 2019 06:00) (54 - 76)  BP: 91/61 (17 Jan 2019 06:00) (77/63 - 112/65)  BP(mean): 69 (17 Jan 2019 06:00) (64 - 78)  RR: 22 (17 Jan 2019 06:00) (18 - 29)  SpO2: 99% (17 Jan 2019 06:00) (95% - 100%)    TESTS & MEASUREMENTS:                        8.6    4.83  )-----------( 40       ( 17 Jan 2019 04:36 )             28.1     01-17    138  |  105  |  17  ----------------------------<  106<H>  3.3<L>   |  17  |  0.7    Ca    7.8<L>      17 Jan 2019 04:36  Phos  3.5     01-16  Mg     1.7     01-17    TPro  7.0  /  Alb  2.2<L>  /  TBili  1.4<H>  /  DBili  x   /  AST  38  /  ALT  16  /  AlkPhos  92  01-17    LIVER FUNCTIONS - ( 17 Jan 2019 04:36 )  Alb: 2.2 g/dL / Pro: 7.0 g/dL / ALK PHOS: 92 U/L / ALT: 16 U/L / AST: 38 U/L / GGT: x             Culture - Blood (collected 01-15-19 @ 16:47)  Source: .Blood None  Preliminary Report (01-16-19 @ 23:01):    No growth to date.    Culture - Body Fluid with Gram Stain (collected 01-15-19 @ 12:56)  Source: .Body Fluid Peritoneal Fluid  Gram Stain (01-16-19 @ 03:00):    polymorphonuclear leukocytes seen    No organisms seen    by cytocentrifuge  Preliminary Report (01-16-19 @ 18:43):    No growth    Culture - Blood (collected 01-15-19 @ 07:41)  Source: .Blood Blood-Peripheral  Preliminary Report (01-16-19 @ 14:01):    No growth to date.    Culture - Blood (collected 01-15-19 @ 07:41)  Source: .Blood Blood-Peripheral  Preliminary Report (01-16-19 @ 14:01):    No growth to date.      Urinalysis Basic - ( 16 Jan 2019 09:50 )    Color: Orange / Appearance: Clear / SG: >=1.030 / pH: x  Gluc: x / Ketone: Trace  / Bili: Moderate / Urobili: 1.0   Blood: x / Protein: 30 / Nitrite: Negative   Leuk Esterase: Trace / RBC: 1-2 /HPF / WBC 3-5 /HPF   Sq Epi: x / Non Sq Epi: Occasional /HPF / Bacteria: x          RADIOLOGY & ADDITIONAL TESTS:    ANTIBIOTICS:  meropenem  IVPB 1000 milliGRAM(s) IV Intermittent every 8 hours  vancomycin  IVPB 750 milliGRAM(s) IV Intermittent every 12 hours

## 2019-01-17 NOTE — PROGRESS NOTE ADULT - ASSESSMENT
IMPRESSION:    Sepsis / Septic shock/ G- BACTEREMIA  RO paracentesis NO sbp  History of cirrhosis with ascites  Portopulmonary hypertension  THROMBOCYTOPENIA        PLAN:    CNS: No depressants     HEENT: Oral care    PULMONARY:  HOB @ 45 degrees, keep sao2 > 92%    CARDIOVASCULAR: 2 d echo, cardio eval TAPER PRESSORS    GI: GI prophylaxis.  GI F/UP. Feeding for now. RUQ sono    RENAL:  Follow up lytes.  Correct as needed. po bicarb    INFECTIOUS DISEASE: PAN cultures, MEROPENEM    HEMATOLOGICAL:  DVT prophylaxis.    ENDOCRINE:  Follow up FS.     MUSCULOSKELETAL: out of bed as tolerated    keep ICU

## 2019-01-18 LAB
ALBUMIN SERPL ELPH-MCNC: 2.5 G/DL — LOW (ref 3.5–5.2)
ALP SERPL-CCNC: 84 U/L — SIGNIFICANT CHANGE UP (ref 30–115)
ALT FLD-CCNC: 14 U/L — SIGNIFICANT CHANGE UP (ref 0–41)
ANION GAP SERPL CALC-SCNC: 12 MMOL/L — SIGNIFICANT CHANGE UP (ref 7–14)
APTT BLD: 38.3 SEC — SIGNIFICANT CHANGE UP (ref 27–39.2)
AST SERPL-CCNC: 32 U/L — SIGNIFICANT CHANGE UP (ref 0–41)
BASOPHILS # BLD AUTO: 0.01 K/UL — SIGNIFICANT CHANGE UP (ref 0–0.2)
BASOPHILS NFR BLD AUTO: 0.4 % — SIGNIFICANT CHANGE UP (ref 0–1)
BILIRUB SERPL-MCNC: 1.2 MG/DL — SIGNIFICANT CHANGE UP (ref 0.2–1.2)
BUN SERPL-MCNC: 12 MG/DL — SIGNIFICANT CHANGE UP (ref 10–20)
CALCIUM SERPL-MCNC: 7.6 MG/DL — LOW (ref 8.5–10.1)
CHLORIDE SERPL-SCNC: 106 MMOL/L — SIGNIFICANT CHANGE UP (ref 98–110)
CO2 SERPL-SCNC: 20 MMOL/L — SIGNIFICANT CHANGE UP (ref 17–32)
CREAT SERPL-MCNC: 0.7 MG/DL — SIGNIFICANT CHANGE UP (ref 0.7–1.5)
CULTURE RESULTS: NO GROWTH — SIGNIFICANT CHANGE UP
EOSINOPHIL # BLD AUTO: 0.07 K/UL — SIGNIFICANT CHANGE UP (ref 0–0.7)
EOSINOPHIL NFR BLD AUTO: 2.9 % — SIGNIFICANT CHANGE UP (ref 0–8)
GLUCOSE SERPL-MCNC: 101 MG/DL — HIGH (ref 70–99)
GRAM STN FLD: SIGNIFICANT CHANGE UP
HCT VFR BLD CALC: 27.6 % — LOW (ref 37–47)
HGB BLD-MCNC: 8.4 G/DL — LOW (ref 12–16)
IMM GRANULOCYTES NFR BLD AUTO: 0.4 % — HIGH (ref 0.1–0.3)
INR BLD: 1.61 RATIO — HIGH (ref 0.65–1.3)
LYMPHOCYTES # BLD AUTO: 0.69 K/UL — LOW (ref 1.2–3.4)
LYMPHOCYTES # BLD AUTO: 28.6 % — SIGNIFICANT CHANGE UP (ref 20.5–51.1)
MAGNESIUM SERPL-MCNC: 1.9 MG/DL — SIGNIFICANT CHANGE UP (ref 1.8–2.4)
MCHC RBC-ENTMCNC: 27.8 PG — SIGNIFICANT CHANGE UP (ref 27–31)
MCHC RBC-ENTMCNC: 30.4 G/DL — LOW (ref 32–37)
MCV RBC AUTO: 91.4 FL — SIGNIFICANT CHANGE UP (ref 81–99)
MONOCYTES # BLD AUTO: 0.23 K/UL — SIGNIFICANT CHANGE UP (ref 0.1–0.6)
MONOCYTES NFR BLD AUTO: 9.5 % — HIGH (ref 1.7–9.3)
NEUTROPHILS # BLD AUTO: 1.4 K/UL — SIGNIFICANT CHANGE UP (ref 1.4–6.5)
NEUTROPHILS NFR BLD AUTO: 58.2 % — SIGNIFICANT CHANGE UP (ref 42.2–75.2)
NRBC # BLD: 0 /100 WBCS — SIGNIFICANT CHANGE UP (ref 0–0)
PHOSPHATE SERPL-MCNC: 1.7 MG/DL — LOW (ref 2.1–4.9)
PLATELET # BLD AUTO: 43 K/UL — LOW (ref 130–400)
POTASSIUM SERPL-MCNC: 4.2 MMOL/L — SIGNIFICANT CHANGE UP (ref 3.5–5)
POTASSIUM SERPL-SCNC: 4.2 MMOL/L — SIGNIFICANT CHANGE UP (ref 3.5–5)
PROT SERPL-MCNC: 7.3 G/DL — SIGNIFICANT CHANGE UP (ref 6–8)
PROTHROM AB SERPL-ACNC: 18.4 SEC — HIGH (ref 9.95–12.87)
RBC # BLD: 3.02 M/UL — LOW (ref 4.2–5.4)
RBC # FLD: 21.5 % — HIGH (ref 11.5–14.5)
SODIUM SERPL-SCNC: 138 MMOL/L — SIGNIFICANT CHANGE UP (ref 135–146)
SPECIMEN SOURCE: SIGNIFICANT CHANGE UP
WBC # BLD: 2.41 K/UL — LOW (ref 4.8–10.8)
WBC # FLD AUTO: 2.41 K/UL — LOW (ref 4.8–10.8)

## 2019-01-18 RX ORDER — LACTULOSE 10 G/15ML
20 SOLUTION ORAL
Qty: 0 | Refills: 0 | Status: DISCONTINUED | OUTPATIENT
Start: 2019-01-18 | End: 2019-01-25

## 2019-01-18 RX ORDER — SODIUM CHLORIDE 9 MG/ML
500 INJECTION INTRAMUSCULAR; INTRAVENOUS; SUBCUTANEOUS ONCE
Qty: 0 | Refills: 0 | Status: COMPLETED | OUTPATIENT
Start: 2019-01-18 | End: 2019-01-18

## 2019-01-18 RX ADMIN — LACTULOSE 20 GRAM(S): 10 SOLUTION ORAL at 00:31

## 2019-01-18 RX ADMIN — SODIUM CHLORIDE 1000 MILLILITER(S): 9 INJECTION INTRAMUSCULAR; INTRAVENOUS; SUBCUTANEOUS at 14:40

## 2019-01-18 RX ADMIN — MIDODRINE HYDROCHLORIDE 10 MILLIGRAM(S): 2.5 TABLET ORAL at 15:59

## 2019-01-18 RX ADMIN — URSODIOL 300 MILLIGRAM(S): 250 TABLET, FILM COATED ORAL at 15:59

## 2019-01-18 RX ADMIN — MIDODRINE HYDROCHLORIDE 10 MILLIGRAM(S): 2.5 TABLET ORAL at 22:13

## 2019-01-18 RX ADMIN — ENOXAPARIN SODIUM 40 MILLIGRAM(S): 100 INJECTION SUBCUTANEOUS at 12:19

## 2019-01-18 RX ADMIN — CEFTRIAXONE 100 GRAM(S): 500 INJECTION, POWDER, FOR SOLUTION INTRAMUSCULAR; INTRAVENOUS at 12:19

## 2019-01-18 RX ADMIN — LACTULOSE 20 GRAM(S): 10 SOLUTION ORAL at 18:12

## 2019-01-18 RX ADMIN — CHLORHEXIDINE GLUCONATE 1 APPLICATION(S): 213 SOLUTION TOPICAL at 12:19

## 2019-01-18 RX ADMIN — PANTOPRAZOLE SODIUM 40 MILLIGRAM(S): 20 TABLET, DELAYED RELEASE ORAL at 18:12

## 2019-01-18 RX ADMIN — SODIUM CHLORIDE 1000 MILLILITER(S): 9 INJECTION INTRAMUSCULAR; INTRAVENOUS; SUBCUTANEOUS at 16:00

## 2019-01-18 RX ADMIN — MIDODRINE HYDROCHLORIDE 10 MILLIGRAM(S): 2.5 TABLET ORAL at 06:32

## 2019-01-18 RX ADMIN — URSODIOL 300 MILLIGRAM(S): 250 TABLET, FILM COATED ORAL at 22:12

## 2019-01-18 RX ADMIN — URSODIOL 300 MILLIGRAM(S): 250 TABLET, FILM COATED ORAL at 06:32

## 2019-01-18 RX ADMIN — PANTOPRAZOLE SODIUM 40 MILLIGRAM(S): 20 TABLET, DELAYED RELEASE ORAL at 06:32

## 2019-01-18 NOTE — CONSULT NOTE ADULT - REASON FOR ADMISSION
abdominal pain for 1.5 days

## 2019-01-18 NOTE — PROGRESS NOTE ADULT - SUBJECTIVE AND OBJECTIVE BOX
Nephrology progress note    Patient is seen and examined, events over the last 24 h noted .    Allergies:  No Known Allergies    Hospital Medications:   MEDICATIONS  (STANDING):  cefTRIAXone   IVPB 2 Gram(s) IV Intermittent every 24 hours  chlorhexidine 4% Liquid 1 Application(s) Topical <User Schedule>  enoxaparin Injectable 40 milliGRAM(s) SubCutaneous daily  lactulose Syrup 20 Gram(s) Oral two times a day  levothyroxine 50 MICROGram(s) Oral daily  midodrine 10 milliGRAM(s) Oral three times a day  norepinephrine Infusion 0.05 MICROgram(s)/kG/Min (5.953 mL/Hr) IV Continuous <Continuous>  pantoprazole    Tablet 40 milliGRAM(s) Oral two times a day  ursodiol Capsule 300 milliGRAM(s) Oral every 8 hours    VITALS:  T(F): 97.1 (19 @ 09:00), Max: 97.8 (19 @ 12:00)  HR: 64 (19 @ 09:00)  BP: 90/57 (19 @ 09:00)  RR: 23 (19 @ 09:00)  SpO2: 98% (19 @ 09:00)  Wt(kg): --     @ 07: @ 07:00  --------------------------------------------------------  IN: 2824 mL / OUT: 1000 mL / NET: 1824 mL     @ 07: @ 07:00  --------------------------------------------------------  IN: 2122 mL / OUT: 1150 mL / NET: 972 mL     @ 07: @ 11:45  --------------------------------------------------------  IN: 258 mL / OUT: 150 mL / NET: 108 mL          PHYSICAL EXAM:  Constitutional: NAD  HEENT: anicteric sclera, oropharynx clear, MMM  Neck: No JVD  Respiratory: CTAB, no wheezes, rales or rhonchi  Cardiovascular: S1, S2, RRR  Gastrointestinal: BS+, soft, NT/ND  Extremities: No cyanosis or clubbing. No peripheral edema  Neurological: A/O x 3, no focal deficits  : No CVA tenderness.   Skin: No rashes  Vascular Access:    LABS:      138  |  106  |  12  ----------------------------<  101<H>  4.2   |  20  |  0.7    Ca    7.6<L>      2019 05:23  Phos  1.7       Mg     1.9         TPro  7.3  /  Alb  2.5<L>  /  TBili  1.2  /  DBili      /  AST  32  /  ALT  14  /  AlkPhos  84                            8.4    2.41  )-----------( 43       ( 2019 05:23 )             27.6     Urine Studies:  Urinalysis Basic - ( 2019 17:30 )    Color: Dark Yellow / Appearance: Cloudy / S.020 / pH:   Gluc:  / Ketone: Negative  / Bili: Negative / Urobili: 0.2 mg/dL   Blood:  / Protein: Negative mg/dL / Nitrite: Negative   Leuk Esterase: Negative / RBC:  / WBC 1-2 /HPF   Sq Epi:  / Non Sq Epi:  / Bacteria: Few /HPF    Blood Gas Profile - Arterial (19 @ 16:35)    pH, Arterial: 7.50: Dr. Mcginnis notified @ 16:40.    pCO2, Arterial: 23 mmHg    pO2, Arterial: 97 mmHg    HCO3, Arterial: 18 mmoL/L    Base Excess, Arterial: -4.3 mmoL/L    Oxygen Saturation, Arterial: 99 %    Anion gap: 12    RADIOLOGY & ADDITIONAL STUDIES:

## 2019-01-18 NOTE — PROGRESS NOTE ADULT - ASSESSMENT
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    IMPRESSION:  Decompensated liver cirrhosis on transplant list  Sepsis secondary to SBP secondary to H influenza  No PNA  No pyelonephritis  No PE  No meningitis  Repeat BCx NTD    RECOMMENDATIONS:  Rocephin 2 gm iv q24h

## 2019-01-18 NOTE — CONSULT NOTE ADULT - ATTENDING COMMENTS
She was seen and examined. Agree with students note. Her daughter-in-law was at bedside who also translated for us as per her request.    She has a long-standing history of cirrhosis due to primary biliary sclerosis for which she is on Ursodiol, she's on the transplant list in [place]. She is normally on Midodrine for hypotension and has been on propanolol but this was stopped due to bradycardia and syncope in December. She is currently admitted for septic shock with Haemophilus in  blood for which she is on vasopressin as well as ceftriaxone. We were asked to comment on her pancytopenia. We reviewed her blood work is available to loss going back to December of 2016.    Her pancytopenia is chronic and this is due to her underlying cirrhosis as well as splenic sequestration with possible component now of bone marrow suppression due to sepsis resulting in possibly slightly worse cytopenia but her numbers are essentially unchanged and have been worse before.    Her elevated INR and is due to her cirrhosis.    Plan:  -Continue current antimicrobial therapy as per infectious disease  -Monitor occasional CBC  -Keep hemoglobin greater than 10 g/dL and she platelets greater than 10,000 if not bleeding  -Continue with Lovenox as long as platelet counts are over 25,000 unless risk of variceal bleed is felt to be higher than risk of thrombosis She was seen and examined. Agree with students note. Her daughter-in-law was at bedside who also translated for us as per her request.    She has a long-standing history of cirrhosis due to primary biliary cirrhosis  for which she is on Ursodiol, she's on the transplant list in [place]. She is normally on Midodrine for hypotension and has been on propanolol but this was stopped due to bradycardia and syncope in December. She is currently admitted for septic shock with Haemophilus in  blood for which she is on vasopressin as well as ceftriaxone. We were asked to comment on her pancytopenia. We reviewed her blood work is available to loss going back to December of 2016.    Her pancytopenia is chronic and this is due to her underlying cirrhosis as well as splenic sequestration with possible component now of bone marrow suppression due to sepsis resulting in possibly slightly worse cytopenia but her numbers are essentially unchanged and have been worse before.    Her elevated INR and is due to her cirrhosis.    Plan:  -Continue current antimicrobial therapy as per infectious disease  -Monitor occasional CBC  -Keep hemoglobin greater than 10 g/dL and she platelets greater than 10,000 if not bleeding  -Continue with Lovenox as long as platelet counts are over 25,000 unless risk of variceal bleed is felt to be higher than risk of thrombosis

## 2019-01-18 NOTE — PROGRESS NOTE ADULT - SUBJECTIVE AND OBJECTIVE BOX
TOO EPSTEIN  58y, Female      OVERNIGHT EVENTS:    no fevers, no complaints, no pressors, no diarrhea  no cough/ SOB, abdominal pain, HAs    VITALS:  T(F): 97.4, Max: 97.8 (19 @ 12:00)  HR: 66  BP: 93/58  RR: 22Vital Signs Last 24 Hrs  T(C): 36.3 (2019 06:00), Max: 36.6 (2019 12:00)  T(F): 97.4 (2019 06:00), Max: 97.8 (2019 12:00)  HR: 66 (2019 06:00) (60 - 98)  BP: 93/58 (2019 06:00) (78/58 - 112/70)  BP(mean): 67 (2019 06:00) (58 - 82)  RR: 22 (2019 06:00) (20 - 31)  SpO2: 95% (2019 06:00) (66% - 100%)    TESTS & MEASUREMENTS:                        8.4    2.41  )-----------( 43       ( 2019 05:23 )             27.6         136  |  103  |  13  ----------------------------<  88  3.9   |  17  |  0.7    Ca    7.4<L>      2019 16:33  Mg     1.7         TPro  7.0  /  Alb  2.2<L>  /  TBili  1.4<H>  /  DBili  x   /  AST  38  /  ALT  16  /  AlkPhos  92      LIVER FUNCTIONS - ( 2019 04:36 )  Alb: 2.2 g/dL / Pro: 7.0 g/dL / ALK PHOS: 92 U/L / ALT: 16 U/L / AST: 38 U/L / GGT: x             Culture - Urine (collected 19 @ 09:50)  Source: .Urine Clean Catch (Midstream)  Final Report (19 @ 21:56):    No growth    Culture - Blood (collected 01-15-19 @ 16:47)  Source: .Blood None  Preliminary Report (01-16-19 @ 23:01):    No growth to date.    Culture - Body Fluid with Gram Stain (collected 01-15-19 @ 12:56)  Source: .Body Fluid Peritoneal Fluid  Gram Stain (19 @ 03:00):    polymorphonuclear leukocytes seen    No organisms seen    by cytocentrifuge  Preliminary Report (19 @ 18:43):    No growth    Culture - Blood (collected 01-15-19 @ 07:41)  Source: .Blood Blood-Peripheral  Gram Stain (19 @ 04:59):    Growth in aerobic bottle: Gram Negative Rods  Preliminary Report (19 @ 04:59):    Growth in aerobic bottle: Gram Negative Rods    Culture - Blood (collected 01-15-19 @ 07:41)  Source: .Blood Blood-Peripheral  Gram Stain (19 @ 07:15):    Growth in aerobic bottle:    Gram Negative Rods  Preliminary Report (19 @ 07:15):    Growth in aerobic bottle:    Gram Negative Rods    "Due to technical problems, Proteus sp. will Not be reported as part of    the BCID panel until further notice"    ***Blood Panel PCR results on this specimen are available    approximately 3 hours after theGram stain result.***    Gram stain, PCR, and/or culture results may not always    correspond due to difference in methodologies.    ************************************************************    This PCR assay was performed using [email or web address].    The following targets are tested for: Enterococcus,    vancomycin resistant enterococci, Listeria monocytogenes,    coagulase negative staphylococci, S. aureus,    methicillin resistant S. aureus, Streptococcus agalactiae    (Group B), S. pneumoniae, S. pyogenes (Group A),    Acinetobacter baumannii, Enterobacter cloacae, E. coli,    Klebsiella oxytoca, K. pneumoniae, Proteus sp.,    Serratia marcescens, Haemophilus influenzae,    Neisseria meningitidis, Pseudomonas aeruginosa, Candida    albicans, C. glabrata, C krusei,C parapsilosis,    C. tropicalis and the KPC resistance gene.  Organism: Blood Culture PCR (19 @ 08:35)  Organism: Blood Culture PCR (19 @ 08:35)      -  Haemophilus influenzae: Detec      Method Type: PCR      Urinalysis Basic - ( 2019 17:30 )    Color: Dark Yellow / Appearance: Cloudy / S.020 / pH: x  Gluc: x / Ketone: Negative  / Bili: Negative / Urobili: 0.2 mg/dL   Blood: x / Protein: Negative mg/dL / Nitrite: Negative   Leuk Esterase: Negative / RBC: x / WBC 1-2 /HPF   Sq Epi: x / Non Sq Epi: x / Bacteria: Few /HPF          RADIOLOGY & ADDITIONAL TESTS:    ANTIBIOTICS:  cefTRIAXone   IVPB 2 Gram(s) IV Intermittent every 24 hours

## 2019-01-18 NOTE — PROGRESS NOTE ADULT - ASSESSMENT
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  Pt was admitted for diagnosis of septic shock possibly 2/2 SBP.     #Severe sepsis 2/2 Gram negative bacteremia possibly 2/2 SBP? - resolving  - de-escalate abx to rocephin  - would recommend therapeutic tap if worsening ascites    #Decompensated liver cirrhosis 2/2 PBC on liver transplant list - MELD 13  - Hb at baseline 10  - Pt needs to be on lasix 40 and aldactone 10. Consider increasing dose of lasix if BP allows  - avoid NSAIDs and BBlockers - titrate off levo. Pts bp is at her baseline.  - c/w midodrine  - pt will require transfer to [facility] once off pressors and downgraded  - recall prn

## 2019-01-18 NOTE — PROGRESS NOTE ADULT - ASSESSMENT
IMPRESSION    Cirrhosis, Acute Decompensation with new onset ascites   Sepsis 2/2 Bacteremia: H. influenzae   Hypothyroidism     PLAN    CNS: Recent L1 compression fx s/p trauma, per neurosurgery no intervention. Avoid CNS depressants.     HEENT: Oral care     PULMONARY: Keep 02 sat > 92%     CARDIOVASCULAR: Wean off Levophed. 500cc bolus PRN. Continue home midodrine. Recent echo with normal systolic function and recent stress test negative.     GI: GI prophylaxis. Continue ursodiol. Lactulose dose decreased. Lasix 40 and aldactone 10 when BP tolerates. Ascitic fluid negative for SBP. GI signed off, recall PRN. Per [facility], can be transferred as [id number] once downgraded.     RENAL: Nephrology consulted. ABG + BMP with evidence of respiratory alkalosis. Monitor electrolytes and I&Os.     INFECTIOUS DISEASE: ID following, Rocephin 2g qd. Follow up repeat blood cx.     ENDOCRINE: Continue Synthroid.     HEMATOLOGIC: DVT prophylaxis     CCU MONITORING  FULL CODE IMPRESSION    Cirrhosis, Acute Decompensation with new onset ascites   Sepsis 2/2 Bacteremia: H. influenzae   Hypothyroidism     PLAN    CNS: Recent L1 compression fx s/p trauma, per neurosurgery no intervention. Avoid CNS depressants.     HEENT: Oral care     PULMONARY: Keep 02 sat > 92%     CARDIOVASCULAR: Wean off Levophed. 500cc bolus PRN. Continue home midodrine. Recent echo with normal systolic function and recent stress test negative.     GI: GI prophylaxis. Continue ursodiol. Lactulose dose decreased. Lasix 40 and aldactone 10 when BP tolerates. Ascitic fluid negative for SBP. GI signed off, recall PRN. Per [facility], can be transferred as [id number] once downgraded.     RENAL: Nephrology consulted. ABG + BMP with evidence of respiratory alkalosis. Monitor electrolytes and I&Os.     INFECTIOUS DISEASE: ID following, Rocephin 2g qd. Follow up repeat blood cx.     ENDOCRINE: Continue Synthroid.     HEMATOLOGIC: DVT prophylaxis. Heme/onc consulted for pancytopenia.     CCU MONITORING  FULL CODE

## 2019-01-18 NOTE — CONSULT NOTE ADULT - ASSESSMENT
58 years old female pt with past medical hx of liver cirrhosis (secondary to primary biliary cirrhosis) complicated with ascites and portopulmonary HTN, GERD, hypotension, presented at the ER because of abdominal and chest pain, admitted for acute decompensated cirrhosis s/p paracentesis, sepsis secondary to bacteremia. Pt. is currently on IV antibiotics and pressors  Heme/onc was consulted for pancytopenia.     Problem list:  1.Pancytopenia likely 2/2 to liver cirrhosis   -baseline CBC from 2018 shows similar pancytopenia, likely secondary to her chronic cirrhosis  Plan:   Trend CBC  Supportive care  Transfuse PLT if indicated or there is any signs to bleeding.  Transfuse pRBC if indicated 58 years old female pt with past medical hx of liver cirrhosis (secondary to primary biliary cirrhosis) complicated with ascites and portopulmonary HTN, GERD, hypotension, presented at the ER because of abdominal and chest pain, admitted for acute decompensated cirrhosis s/p paracentesis, sepsis secondary to bacteremia. Pt. is currently on IV antibiotics and pressors  Heme/onc was consulted for pancytopenia.     Problem list:  1. Pancytopenia likely 2/2 to known liver cirrhosis from primary biliary cirrhosis.  - Prior CBCs reviewed.  CBC in 2016 showed pancytopenia with similar values.  - Continue supportive care.  Transfuse for hemoglobin <7, platelets <10,000, or active bleeding.

## 2019-01-18 NOTE — CONSULT NOTE ADULT - SUBJECTIVE AND OBJECTIVE BOX
Pt. is a 58 years old female pt with PMHx liver cirrhosis 2/2 primary biliary cirrhosis complicated with ascites and portopulmonary HTN came to ER with chief compliant abdominal and chest pain since 01/15/19.  Pt. is Czech speaking. Per son, patient was recently admitted almost 3 months back because of chest pain, was fully worked up, found to have large ascites which resolved with diuretics, she is on active transplant list in [place] awaiting for cardiology clearance  Since yesterday night she was complaining of chest pain mainly in epigastric area, non radiating associated with nausea and vomiting gradually worsened over time, associated with recorded fever of 103.  Patient is being treated for sepsis (G- bacteriemia)/ septic shock, liver cirrhosis s/p paracentesis,  portopulmonary,  hypertension.    Nephrology was consulted for evaluation of pancytopenia. Pt. has stable pancytopenia since her admission (1/15/19). Her baseline CBC from 2/15/18 admission shows similar pancytopenia with very low platelet count    PAST MEDICAL & SURGICAL HISTORY:  Splenomegaly  Hypotension  GERD (gastroesophageal reflux disease)  Liver cirrhosis  History of appendectomy  History of tonsillectomy    Allergies:  No Known Allergies    Home Medications Reviewed    Hospital Medications:   MEDICATIONS  (STANDING):  cefTRIAXone   IVPB 2 Gram(s) IV Intermittent every 24 hours  chlorhexidine 4% Liquid 1 Application(s) Topical <User Schedule>  enoxaparin Injectable 40 milliGRAM(s) SubCutaneous daily  lactated ringers Bolus 500 milliLiter(s) IV Bolus once  lactulose Syrup 20 Gram(s) Oral every 6 hours  levothyroxine 50 MICROGram(s) Oral daily  midodrine 10 milliGRAM(s) Oral three times a day  norepinephrine Infusion 0.05 MICROgram(s)/kG/Min (5.953 mL/Hr) IV Continuous <Continuous>  pantoprazole    Tablet 40 milliGRAM(s) Oral two times a day  potassium chloride    Tablet ER 40 milliEquivalent(s) Oral every 4 hours  sodium bicarbonate 650 milliGRAM(s) Oral three times a day  ursodiol Capsule 300 milliGRAM(s) Oral every 8 hours      SOCIAL HISTORY:  Denies ETOH, Smoking,     FAMILY HISTORY:  Family history of diabetes mellitus (Sibling)  Family history of heart disease (Father)        REVIEW OF SYSTEMS:  CONSTITUTIONAL: No weakness, fevers or chills  EYES/ENT: No visual changes;  No vertigo or throat pain   NECK: No pain or stiffness  RESPIRATORY: No cough, wheezing, hemoptysis; No shortness of breath  CARDIOVASCULAR: No chest pain or palpitations.  GASTROINTESTINAL: mild reduced abdominal pain.  No nausea, vomiting, or hematemesis; No diarrhea or constipation. No melena or hematochezia.  GENITOURINARY: No dysuria, frequency, foamy urine, urinary urgency, incontinence or hematuria  NEUROLOGICAL: No numbness or weakness  SKIN: No itching, burning, rashes, or lesions   VASCULAR: No bilateral lower extremity edema.   All other review of systems is negative unless indicated above.    VITALS:  T(F): 97.1 (19 @ 09:00), Max: 97.8 (19 @ 12:00)  HR: 64 (19 @ 09:00)  BP: 90/57 (19 @ 09:00)  RR: 23 (19 @ 09:00)  SpO2: 98% (19 @ 09:00)  Wt(kg): --     @ 07: @ 07:00  --------------------------------------------------------  IN: 2824 mL / OUT: 1000 mL / NET: 1824 mL     @ 07: @ 07:00  --------------------------------------------------------  IN: 2122 mL / OUT: 1150 mL / NET: 972 mL     @ 07: @ 11:45  --------------------------------------------------------  IN: 258 mL / OUT: 150 mL / NET: 108 mL      PHYSICAL EXAM:  Constitutional: NAD  HEENT: anicteric sclera, oropharynx clear, MMM  Neck: No JVD  Respiratory: CTAB, no wheezes, rales or rhonchi  Cardiovascular: S1, S2, RRR  Gastrointestinal: BS+, soft, NT/ND  Extremities: No cyanosis or clubbing. No peripheral edema  Neurological: A/O x 3, no focal deficits  Psychiatric: Normal mood, normal affect  : No CVA tenderness. No pacheco.   Skin: No rashes  Vascular Access:    LABS:      138  |  106  |  12  ----------------------------<  101<H>  4.2   |  20  |  0.7    Ca    7.6<L>      2019 05:23  Phos  1.7       Mg     1.9         TPro  7.3  /  Alb  2.5<L>  /  TBili  1.2  /  DBili      /  AST  32  /  ALT  14  /  AlkPhos  84                            8.4    2.41  )-----------( 43       ( 2019 05:23 )             27.6     Urine Studies:  Urinalysis Basic - ( 2019 17:30 )    Color: Dark Yellow / Appearance: Cloudy / S.020 / pH:   Gluc:  / Ketone: Negative  / Bili: Negative / Urobili: 0.2 mg/dL   Blood:  / Protein: Negative mg/dL / Nitrite: Negative   Leuk Esterase: Negative / RBC:  / WBC 1-2 /HPF   Sq Epi:  / Non Sq Epi:  / Bacteria: Few /HPF      RADIOLOGY & ADDITIONAL STUDIES:    Impression:      Low lungvolumes with bibasilar atelectasis. Pt. is a 58 year old female pt with PMHx liver cirrhosis 2/2 primary biliary cirrhosis complicated with ascites and portopulmonary HTN came to ER with chief compliant abdominal and chest pain since 01/15/19.    Pt. is Persian speaking. Per son, patient was recently admitted almost 3 months back because of chest pain, was fully worked up, found to have large ascites which resolved with diuretics, she is on active transplant list in [place] awaiting for cardiology clearance.    For one day, she was complaining of chest pain mainly in epigastric area, non radiating associated with nausea and vomiting gradually worsened over time, associated with recorded fever of 103.  Patient is being treated for sepsis (G negative bacteriemia)/ septic shock, liver cirrhosis s/p paracentesis,  portopulmonary hypertension.    Nephrology was consulted for evaluation of pancytopenia. Pt. has stable pancytopenia since her admission (1/15/19). Her baseline CBC from 2/15/18 admission shows pancytopenia with similar values.    PAST MEDICAL & SURGICAL HISTORY:  Splenomegaly  Hypotension  GERD (gastroesophageal reflux disease)  Liver cirrhosis  History of appendectomy  History of tonsillectomy    Allergies:  No Known Allergies    Home Medications Reviewed    Hospital Medications:   MEDICATIONS  (STANDING):  cefTRIAXone   IVPB 2 Gram(s) IV Intermittent every 24 hours  chlorhexidine 4% Liquid 1 Application(s) Topical <User Schedule>  enoxaparin Injectable 40 milliGRAM(s) SubCutaneous daily  lactated ringers Bolus 500 milliLiter(s) IV Bolus once  lactulose Syrup 20 Gram(s) Oral every 6 hours  levothyroxine 50 MICROGram(s) Oral daily  midodrine 10 milliGRAM(s) Oral three times a day  norepinephrine Infusion 0.05 MICROgram(s)/kG/Min (5.953 mL/Hr) IV Continuous <Continuous>  pantoprazole    Tablet 40 milliGRAM(s) Oral two times a day  potassium chloride    Tablet ER 40 milliEquivalent(s) Oral every 4 hours  sodium bicarbonate 650 milliGRAM(s) Oral three times a day  ursodiol Capsule 300 milliGRAM(s) Oral every 8 hours      SOCIAL HISTORY:  Denies ETOH, Smoking,     FAMILY HISTORY:  Family history of diabetes mellitus (Sibling)  Family history of heart disease (Father)    REVIEW OF SYSTEMS:  CONSTITUTIONAL: No weakness, fevers or chills  EYES/ENT: No visual changes;  No vertigo or throat pain   NECK: No pain or stiffness  RESPIRATORY: No cough, wheezing, hemoptysis; No shortness of breath  CARDIOVASCULAR: No chest pain or palpitations.  GASTROINTESTINAL: mild reduced abdominal pain.  No nausea, vomiting, or hematemesis; No diarrhea or constipation. No melena or hematochezia.  GENITOURINARY: No dysuria, frequency, foamy urine, urinary urgency, incontinence or hematuria  NEUROLOGICAL: No numbness or weakness  SKIN: No itching, burning, rashes, or lesions   VASCULAR: No bilateral lower extremity edema.   All other review of systems is negative unless indicated above.    VITALS:  T(F): 97.1 (19 @ 09:00), Max: 97.8 (19 @ 12:00)  HR: 64 (19 @ 09:00)  BP: 90/57 (19 @ 09:00)  RR: 23 (19 @ 09:00)  SpO2: 98% (19 09:00)  Wt(kg): --     @ : @ 07:00  --------------------------------------------------------  IN: 2824 mL / OUT: 1000 mL / NET: 1824 mL     @ 07: @ 07:00  --------------------------------------------------------  IN: 2122 mL / OUT: 1150 mL / NET: 972 mL     @ 07: @ 11:45  --------------------------------------------------------  IN: 258 mL / OUT: 150 mL / NET: 108 mL      PHYSICAL EXAM:  Constitutional: NAD  HEENT: anicteric sclera, oropharynx clear, MMM  Respiratory: CTAB, no wheezes, rales or rhonchi  Cardiovascular: S1, S2, RRR  Gastrointestinal: BS+, soft, NT/ND.  Extremities: No cyanosis or clubbing. No peripheral edema.  Neurological: A/O x 3, no focal deficits  Psychiatric: Normal mood, normal affect  : No CVA tenderness. No pacheco.   Skin: No rashes    LABS:      138  |  106  |  12  ----------------------------<  101<H>  4.2   |  20  |  0.7    Ca    7.6<L>      2019 05:23  Phos  1.7       Mg     1.9         TPro  7.3  /  Alb  2.5<L>  /  TBili  1.2  /  DBili      /  AST  32  /  ALT  14  /  AlkPhos  84                            8.4    2.41  )-----------( 43       ( 2019 05:23 )             27.6     Urine Studies:  Urinalysis Basic - ( 2019 17:30 )    Color: Dark Yellow / Appearance: Cloudy / S.020 / pH:   Gluc:  / Ketone: Negative  / Bili: Negative / Urobili: 0.2 mg/dL   Blood:  / Protein: Negative mg/dL / Nitrite: Negative   Leuk Esterase: Negative / RBC:  / WBC 1-2 /HPF   Sq Epi:  / Non Sq Epi:  / Bacteria: Few /HPF      RADIOLOGY & ADDITIONAL STUDIES:    Impression:      Low lungvolumes with bibasilar atelectasis.

## 2019-01-18 NOTE — PROGRESS NOTE ADULT - ASSESSMENT
58 years old female pt with past medical hx of liver cirrhosis (secondary to primary biliary cirrhosis) complicated with ascites and portopulmonary HTN, GERD, hypotension, came to ER because of abdominal and chest pain, admitted for acute decompensated cirrhosis s/p paracentesis, sepsis secondary to bacteremia. Currently on IV antibiotics and pressors  Nephrology was consulted for initial evaluation of HAGMA at presentation     #Respiratory alkalosis with mild metabolic acidosis  A-->16-->12  - respiratory alkalosis likely secondary to chronic cirrhosis with mild metabolic acidosis (anion gap: 12 due to elevated lactate).   - Strict Is and Os  - Continue current management per CCU team    not yet discussed with attending

## 2019-01-18 NOTE — PROGRESS NOTE ADULT - SUBJECTIVE AND OBJECTIVE BOX
Patient is a 58y old  Female who presents with a chief complaint of abdominal pain for 1.5 days (2019 06:49)        Over Night Events:    no overnight events  on levophed 0.05    ROS:  See HPI    PHYSICAL EXAM    ICU Vital Signs Last 24 Hrs  T(C): 36.2 (2019 09:00), Max: 36.6 (2019 12:00)  T(F): 97.1 (2019 09:00), Max: 97.8 (2019 12:00)  HR: 64 (2019 09:00) (60 - 98)  BP: 90/57 (2019 09:00) (78/58 - 112/70)  BP(mean): 65 (2019 09:00) (58 - 83)  ABP: --  ABP(mean): --  RR: 23 (2019 09:00) (17 - 31)  SpO2: 98% (2019 09:00) (66% - 100%)      General: NAD  HEENT: HEMA             Lymphatic system: No cervical LN   Lungs: Bilateral BS CTA b/l  Cardiovascular: Regular   Gastrointestinal: Soft, Positive BS  Extremities: No clubbing.  Moves extremities.  Full Range of motion   Skin: Warm, intact  Neurological: No motor or sensory deficit nonfocal alert awake follows commands      19 @ 07:  -  19 @ 07:00  --------------------------------------------------------  IN:    IV PiggyBack: 50 mL    Lactated Ringers IV Bolus: 500 mL    norepinephrine Infusion: 132 mL    Oral Fluid: 1440 mL  Total IN: 2122 mL    OUT:    Voided: 1150 mL  Total OUT: 1150 mL    Total NET: 972 mL      19 @ 07:  -  19 @ 09:52  --------------------------------------------------------  IN:    norepinephrine Infusion: 18 mL    Oral Fluid: 240 mL  Total IN: 258 mL    OUT:    Voided: 150 mL  Total OUT: 150 mL    Total NET: 108 mL          LABS:                            8.4    2.41  )-----------( 43       ( 2019 05:23 )             27.6                                               18    138  |  106  |  12  ----------------------------<  101<H>  4.2   |  20  |  0.7    Ca    7.6<L>      2019 05:23  Phos  1.7       Mg     1.9         TPro  7.3  /  Alb  2.5<L>  /  TBili  1.2  /  DBili  x   /  AST  32  /  ALT  14  /  AlkPhos  84  18      PT/INR - ( 2019 05:23 )   PT: 18.40 sec;   INR: 1.61 ratio         PTT - ( 2019 05:23 )  PTT:38.3 sec                                       Urinalysis Basic - ( 2019 17:30 )    Color: Dark Yellow / Appearance: Cloudy / S.020 / pH: x  Gluc: x / Ketone: Negative  / Bili: Negative / Urobili: 0.2 mg/dL   Blood: x / Protein: Negative mg/dL / Nitrite: Negative   Leuk Esterase: Negative / RBC: x / WBC 1-2 /HPF   Sq Epi: x / Non Sq Epi: x / Bacteria: Few /HPF        CARDIAC MARKERS ( 2019 04:36 )  x     / <0.01 ng/mL / x     / x     / 1.6 ng/mL  CARDIAC MARKERS ( 2019 16:45 )  x     / 0.01 ng/mL / x     / x     / 2.5 ng/mL                                            LIVER FUNCTIONS - ( 2019 05:23 )  Alb: 2.5 g/dL / Pro: 7.3 g/dL / ALK PHOS: 84 U/L / ALT: 14 U/L / AST: 32 U/L / GGT: x                                                  Culture - Urine (collected 2019 09:50)  Source: .Urine Clean Catch (Midstream)  Final Report (2019 21:56):    No growth    Culture - Blood (collected [address] [place] 2019 16:47)  Source: .Blood None  Preliminary Report (2019 23:01):    No growth to date.    Culture - Body Fluid with Gram Stain (collected 15 [place] 2019 12:56)  Source: .Body Fluid Peritoneal Fluid  Gram Stain (2019 03:00):    polymorphonuclear leukocytes seen    No organisms seen    by cytocentrifuge  Preliminary Report (2019 18:43):    No growth                                                                                       ABG - ( 2019 16:35 )  pH, Arterial: 7.50  pH, Blood: x     /  pCO2: 23    /  pO2: 97    / HCO3: 18    / Base Excess: -4.3  /  SaO2: 99                  MEDICATIONS  (STANDING):  cefTRIAXone   IVPB 2 Gram(s) IV Intermittent every 24 hours  chlorhexidine 4% Liquid 1 Application(s) Topical <User Schedule>  enoxaparin Injectable 40 milliGRAM(s) SubCutaneous daily  lactulose Syrup 20 Gram(s) Oral every 6 hours  levothyroxine 50 MICROGram(s) Oral daily  midodrine 10 milliGRAM(s) Oral three times a day  norepinephrine Infusion 0.05 MICROgram(s)/kG/Min (5.953 mL/Hr) IV Continuous <Continuous>  pantoprazole    Tablet 40 milliGRAM(s) Oral two times a day  ursodiol Capsule 300 milliGRAM(s) Oral every 8 hours    MEDICATIONS  (PRN):  morphine  - Injectable 1 milliGRAM(s) IV Push every 6 hours PRN Severe Pain (7 - 10)      Xrays:          unremarkable                                                                                < from: Transthoracic Echocardiogram (18 @ 15:03) >  Summary:   1. LV Ejection Fraction by Byers's Method with a biplane EF of 69 %.   2. Mildly increased LV wall thickness.   3. Moderate tricuspid regurgitation.   4. Mild pulmonic valve regurgitation.   5. Estimated pulmonary artery systolic pressure is 89.3 mmHg assuming a   right atrial pressure of 5 mmHg, which is consistent with severe   pulmonary hypertension.   6. Severely enlarged right ventricle.   7. Moderately reduced RV systolic function.   8. Moderately increased RV wall thickness.   9. Spectral Doppler shows impaired relaxation pattern of left   ventricular myocardial filling (Grade I diastolic dysfunction).    < end of copied text > Patient is a 58y old  Female who presents with a chief complaint of abdominal pain for 1.5 days (2019 06:49)        Over Night Events:    no overnight events  no abdominal pain  4 watery stools overnight on lactulose   on levophed 0.05    ROS:  See HPI    PHYSICAL EXAM    ICU Vital Signs Last 24 Hrs  T(C): 36.2 (2019 09:00), Max: 36.6 (2019 12:00)  T(F): 97.1 (2019 09:00), Max: 97.8 (2019 12:00)  HR: 64 (2019 09:00) (60 - 98)  BP: 90/57 (2019 09:00) (78/58 - 112/70)  BP(mean): 65 (2019 09:00) (58 - 83)  ABP: --  ABP(mean): --  RR: 23 (2019 09:00) (17 - 31)  SpO2: 98% (2019 09:00) (66% - 100%)      General: NAD  HEENT: HEMA             Lymphatic system: No cervical LN   Lungs: Bilateral BS CTA b/l  Cardiovascular: Regular   Gastrointestinal: Soft, Positive BS  Extremities: No clubbing.  Moves extremities.  Full Range of motion   Skin: Warm, intact  Neurological: No motor or sensory deficit nonfocal alert awake follows commands      19 @ 07:  -  19 @ 07:00  --------------------------------------------------------  IN:    IV PiggyBack: 50 mL    Lactated Ringers IV Bolus: 500 mL    norepinephrine Infusion: 132 mL    Oral Fluid: 1440 mL  Total IN: 2122 mL    OUT:    Voided: 1150 mL  Total OUT: 1150 mL    Total NET: 972 mL      19 @ 07:01  -  19 @ 09:52  --------------------------------------------------------  IN:    norepinephrine Infusion: 18 mL    Oral Fluid: 240 mL  Total IN: 258 mL    OUT:    Voided: 150 mL  Total OUT: 150 mL    Total NET: 108 mL          LABS:                            8.4    2.41  )-----------( 43       ( 2019 05:23 )             27.6                                               18    138  |  106  |  12  ----------------------------<  101<H>  4.2   |  20  |  0.7    Ca    7.6<L>      2019 05:23  Phos  1.7     18  Mg     1.9         TPro  7.3  /  Alb  2.5<L>  /  TBili  1.2  /  DBili  x   /  AST  32  /  ALT  14  /  AlkPhos  84  18      PT/INR - ( 2019 05:23 )   PT: 18.40 sec;   INR: 1.61 ratio         PTT - ( 2019 05:23 )  PTT:38.3 sec                                       Urinalysis Basic - ( 2019 17:30 )    Color: Dark Yellow / Appearance: Cloudy / S.020 / pH: x  Gluc: x / Ketone: Negative  / Bili: Negative / Urobili: 0.2 mg/dL   Blood: x / Protein: Negative mg/dL / Nitrite: Negative   Leuk Esterase: Negative / RBC: x / WBC 1-2 /HPF   Sq Epi: x / Non Sq Epi: x / Bacteria: Few /HPF        CARDIAC MARKERS ( 2019 04:36 )  x     / <0.01 ng/mL / x     / x     / 1.6 ng/mL  CARDIAC MARKERS ( 2019 16:45 )  x     / 0.01 ng/mL / x     / x     / 2.5 ng/mL                                            LIVER FUNCTIONS - ( 2019 05:23 )  Alb: 2.5 g/dL / Pro: 7.3 g/dL / ALK PHOS: 84 U/L / ALT: 14 U/L / AST: 32 U/L / GGT: x                                                  Culture - Urine (collected 2019 09:50)  Source: .Urine Clean Catch (Midstream)  Final Report (2019 21:56):    No growth    Culture - Blood (collected 15 [place] 2019 16:47)  Source: .Blood None  Preliminary Report (2019 23:01):    No growth to date.    Culture - Body Fluid with Gram Stain (collected 15 [place] 2019 12:56)  Source: .Body Fluid Peritoneal Fluid  Gram Stain (2019 03:00):    polymorphonuclear leukocytes seen    No organisms seen    by cytocentrifuge  Preliminary Report (2019 18:43):    No growth                                                                                       ABG - ( 2019 16:35 )  pH, Arterial: 7.50  pH, Blood: x     /  pCO2: 23    /  pO2: 97    / HCO3: 18    / Base Excess: -4.3  /  SaO2: 99                  MEDICATIONS  (STANDING):  cefTRIAXone   IVPB 2 Gram(s) IV Intermittent every 24 hours  chlorhexidine 4% Liquid 1 Application(s) Topical <User Schedule>  enoxaparin Injectable 40 milliGRAM(s) SubCutaneous daily  lactulose Syrup 20 Gram(s) Oral every 6 hours  levothyroxine 50 MICROGram(s) Oral daily  midodrine 10 milliGRAM(s) Oral three times a day  norepinephrine Infusion 0.05 MICROgram(s)/kG/Min (5.953 mL/Hr) IV Continuous <Continuous>  pantoprazole    Tablet 40 milliGRAM(s) Oral two times a day  ursodiol Capsule 300 milliGRAM(s) Oral every 8 hours    MEDICATIONS  (PRN):  morphine  - Injectable 1 milliGRAM(s) IV Push every 6 hours PRN Severe Pain (7 - 10)      Xrays:          unremarkable                                                                                < from: Transthoracic Echocardiogram (18 @ 15:03) >  Summary:   1. LV Ejection Fraction by Byers's Method with a biplane EF of 69 %.   2. Mildly increased LV wall thickness.   3. Moderate tricuspid regurgitation.   4. Mild pulmonic valve regurgitation.   5. Estimated pulmonary artery systolic pressure is 89.3 mmHg assuming a   right atrial pressure of 5 mmHg, which is consistent with severe   pulmonary hypertension.   6. Severely enlarged right ventricle.   7. Moderately reduced RV systolic function.   8. Moderately increased RV wall thickness.   9. Spectral Doppler shows impaired relaxation pattern of left   ventricular myocardial filling (Grade I diastolic dysfunction).    < end of copied text > Patient is a 58y old  Female who presents with a chief complaint of abdominal pain for 1.5 days (2019 06:49)        Over Night Events:    no overnight events  no abdominal pain  4 watery stools overnight on lactulose   on levophed 0.05    ROS:  See HPI    PHYSICAL EXAM    ICU Vital Signs Last 24 Hrs  T(C): 36.2 (2019 09:00), Max: 36.6 (2019 12:00)  T(F): 97.1 (2019 09:00), Max: 97.8 (2019 12:00)  HR: 64 (2019 09:00) (60 - 98)  BP: 90/57 (2019 09:00) (78/58 - 112/70)  BP(mean): 65 (2019 09:00) (58 - 83)  RR: 23 (2019 09:00) (17 - 31)  SpO2: 98% (2019 09:00) (66% - 100%)      General: NAD  HEENT: HEMA             Lymphatic system: No cervical LN   Lungs: Bilateral BS CTA b/l  Cardiovascular: Regular   Gastrointestinal: Soft, Positive BS, distended  Extremities: No clubbing.  Moves extremities.  Full Range of motion   Skin: Warm, intact  Neurological: No motor or sensory deficit nonfocal alert awake follows commands      19 @ 07:  -  19 @ 07:00  --------------------------------------------------------  IN:    IV PiggyBack: 50 mL    Lactated Ringers IV Bolus: 500 mL    norepinephrine Infusion: 132 mL    Oral Fluid: 1440 mL  Total IN: 2122 mL    OUT:    Voided: 1150 mL  Total OUT: 1150 mL    Total NET: 972 mL      19 @ 07:01  -  19 @ 09:52  --------------------------------------------------------  IN:    norepinephrine Infusion: 18 mL    Oral Fluid: 240 mL  Total IN: 258 mL    OUT:    Voided: 150 mL  Total OUT: 150 mL    Total NET: 108 mL          LABS:                            8.4    2.41  )-----------( 43       ( 2019 05:23 )             27.6                                               18    138  |  106  |  12  ----------------------------<  101<H>  4.2   |  20  |  0.7    Ca    7.6<L>      2019 05:23  Phos  1.7       Mg     1.9         TPro  7.3  /  Alb  2.5<L>  /  TBili  1.2  /  DBili  x   /  AST  32  /  ALT  14  /  AlkPhos  84  18      PT/INR - ( 2019 05:23 )   PT: 18.40 sec;   INR: 1.61 ratio         PTT - ( 2019 05:23 )  PTT:38.3 sec                                       Urinalysis Basic - ( 2019 17:30 )    Color: Dark Yellow / Appearance: Cloudy / S.020 / pH: x  Gluc: x / Ketone: Negative  / Bili: Negative / Urobili: 0.2 mg/dL   Blood: x / Protein: Negative mg/dL / Nitrite: Negative   Leuk Esterase: Negative / RBC: x / WBC 1-2 /HPF   Sq Epi: x / Non Sq Epi: x / Bacteria: Few /HPF        CARDIAC MARKERS ( 2019 04:36 )  x     / <0.01 ng/mL / x     / x     / 1.6 ng/mL  CARDIAC MARKERS ( 2019 16:45 )  x     / 0.01 ng/mL / x     / x     / 2.5 ng/mL                                            LIVER FUNCTIONS - ( 2019 05:23 )  Alb: 2.5 g/dL / Pro: 7.3 g/dL / ALK PHOS: 84 U/L / ALT: 14 U/L / AST: 32 U/L / GGT: x                                                  Culture - Urine (collected 2019 09:50)  Source: .Urine Clean Catch (Midstream)  Final Report (2019 21:56):    No growth    Culture - Blood (collected 15 [place] 2019 16:47)  Source: .Blood None  Preliminary Report (2019 23:01):    No growth to date.    Culture - Body Fluid with Gram Stain (collected 15 [place] 2019 12:56)  Source: .Body Fluid Peritoneal Fluid  Gram Stain (2019 03:00):    polymorphonuclear leukocytes seen    No organisms seen    by cytocentrifuge  Preliminary Report (2019 18:43):    No growth                                                                                       ABG - ( 2019 16:35 )  pH, Arterial: 7.50  pH, Blood: x     /  pCO2: 23    /  pO2: 97    / HCO3: 18    / Base Excess: -4.3  /  SaO2: 99                  MEDICATIONS  (STANDING):  cefTRIAXone   IVPB 2 Gram(s) IV Intermittent every 24 hours  chlorhexidine 4% Liquid 1 Application(s) Topical <User Schedule>  enoxaparin Injectable 40 milliGRAM(s) SubCutaneous daily  lactulose Syrup 20 Gram(s) Oral every 6 hours  levothyroxine 50 MICROGram(s) Oral daily  midodrine 10 milliGRAM(s) Oral three times a day  norepinephrine Infusion 0.05 MICROgram(s)/kG/Min (5.953 mL/Hr) IV Continuous <Continuous>  pantoprazole    Tablet 40 milliGRAM(s) Oral two times a day  ursodiol Capsule 300 milliGRAM(s) Oral every 8 hours    MEDICATIONS  (PRN):  morphine  - Injectable 1 milliGRAM(s) IV Push every 6 hours PRN Severe Pain (7 - 10)      Xrays:          unremarkable                                                                                < from: Transthoracic Echocardiogram (18 @ 15:03) >  Summary:   1. LV Ejection Fraction by Byers's Method with a biplane EF of 69 %.   2. Mildly increased LV wall thickness.   3. Moderate tricuspid regurgitation.   4. Mild pulmonic valve regurgitation.   5. Estimated pulmonary artery systolic pressure is 89.3 mmHg assuming a   right atrial pressure of 5 mmHg, which is consistent with severe   pulmonary hypertension.   6. Severely enlarged right ventricle.   7. Moderately reduced RV systolic function.   8. Moderately increased RV wall thickness.   9. Spectral Doppler shows impaired relaxation pattern of left   ventricular myocardial filling (Grade I diastolic dysfunction).    < end of copied text >

## 2019-01-18 NOTE — PROGRESS NOTE ADULT - SUBJECTIVE AND OBJECTIVE BOX
Patient is a 58y old  Female who presents with a chief complaint of abdominal pain for 1.5 days (18 Jan 2019 09:52)    Pt is doing well overall. Does not endorse any abdominal pain. Pt is having regular BMs    PAST MEDICAL & SURGICAL HISTORY:  Splenomegaly  Hypotension  GERD (gastroesophageal reflux disease)  Liver cirrhosis  History of appendectomy  History of tonsillectomy          MEDICATIONS  (STANDING):  cefTRIAXone   IVPB 2 Gram(s) IV Intermittent every 24 hours  chlorhexidine 4% Liquid 1 Application(s) Topical <User Schedule>  enoxaparin Injectable 40 milliGRAM(s) SubCutaneous daily  lactulose Syrup 20 Gram(s) Oral two times a day  levothyroxine 50 MICROGram(s) Oral daily  midodrine 10 milliGRAM(s) Oral three times a day  norepinephrine Infusion 0.05 MICROgram(s)/kG/Min (5.953 mL/Hr) IV Continuous <Continuous>  pantoprazole    Tablet 40 milliGRAM(s) Oral two times a day  ursodiol Capsule 300 milliGRAM(s) Oral every 8 hours    MEDICATIONS  (PRN):  morphine  - Injectable 1 milliGRAM(s) IV Push every 6 hours PRN Severe Pain (7 - 10)      Allergies    No Known Allergies    Intolerances        FAMILY HISTORY:  Family history of diabetes mellitus (Sibling)  Family history of heart disease (Father)      SOCIAL    REVIEW OF SYSTEMS    Vital Signs Last 24 Hrs  T(C): 36.2 (18 Jan 2019 09:00), Max: 36.6 (17 Jan 2019 12:00)  T(F): 97.1 (18 Jan 2019 09:00), Max: 97.8 (17 Jan 2019 12:00)  HR: 64 (18 Jan 2019 09:00) (60 - 98)  BP: 90/57 (18 Jan 2019 09:00) (80/54 - 112/70)  BP(mean): 65 (18 Jan 2019 09:00) (58 - 83)  RR: 23 (18 Jan 2019 09:00) (17 - 31)  SpO2: 98% (18 Jan 2019 09:00) (66% - 100%)    GENERAL:  no distress  HEENT:  NC/AT,  anicteric  CHEST:   no increased effort, breath sounds clear  HEART:  Regular rhythm  ABDOMEN:  Soft, non-tender, Distended abdomen w/ +ve fluid shift normoactive bowel sounds,  no masses   EXTEREMITIES:  no cyanosis      CBC Full  -  ( 18 Jan 2019 05:23 )  WBC Count : 2.41 K/uL  Hemoglobin : 8.4 g/dL  Hematocrit : 27.6 %  Platelet Count - Automated : 43 K/uL  Mean Cell Volume : 91.4 fL  Mean Cell Hemoglobin : 27.8 pg  Mean Cell Hemoglobin Concentration : 30.4 g/dL  Auto Neutrophil # : 1.40 K/uL  Auto Lymphocyte # : 0.69 K/uL  Auto Monocyte # : 0.23 K/uL  Auto Eosinophil # : 0.07 K/uL  Auto Basophil # : 0.01 K/uL  Auto Neutrophil % : 58.2 %  Auto Lymphocyte % : 28.6 %  Auto Monocyte % : 9.5 %  Auto Eosinophil % : 2.9 %  Auto Basophil % : 0.4 %      PT/INR - ( 18 Jan 2019 05:23 )   PT: 18.40 sec;   INR: 1.61 ratio         PTT - ( 18 Jan 2019 05:23 )  PTT:38.3 sec    01-18    138  |  106  |  12  ----------------------------<  101<H>  4.2   |  20  |  0.7    Ca    7.6<L>      18 Jan 2019 05:23  Phos  1.7     01-18  Mg     1.9     01-18    TPro  7.3  /  Alb  2.5<L>  /  TBili  1.2  /  DBili  x   /  AST  32  /  ALT  14  /  AlkPhos  84  01-18        AMYLASE                  01-15 @ 04:46   --  LIPASE                   01-15 @ 04:46  109  HCG  --                    01-15 @ 04:46          RADIOLOGY

## 2019-01-18 NOTE — PROGRESS NOTE ADULT - ASSESSMENT
IMPRESSION:    Sepsis / Septic shock second to H.influenza  ?SBP cultures neg  History of cirrhosis with ascites  Portopulmonary hypertension  Pancytopenia  Respiratory alkalosis with NAGMA [ascites likely causing shallow breathing and NAGMA second to diarhhea from lactulose]    PLAN:    CNS: No depressants     HEENT: Oral care    PULMONARY:  HOB @ 45 degrees, keep sao2 > 92%    CARDIOVASCULAR: 2 d echo, cardio eval TAPER PRESSORS, 500cc NS bolus if need    GI: GI prophylaxis.  GI F/UP. PO Feeding for now. RUQ sono no cholecysitis, reduce lactulose    RENAL:  Follow up lytes.  Correct as needed. off bicarb, renal f/u    INFECTIOUS DISEASE: PAN cultures, MEROPENEM    HEMATOLOGICAL:  DVT prophylaxis. hem/onc eval for pancytopenia    ENDOCRINE:  Follow up FS.     MUSCULOSKELETAL: out of bed as tolerated    keep ICU IMPRESSION:    Septic shock second to H.influenza bacteremia ?SBP as source cultures neg  History of cirrhosis with ascites second to PBC  Portopulmonary hypertension  Pancytopenia  Respiratory alkalosis with NAGMA [ascites likely causing shallow breathing and NAGMA second to diarrhea from lactulose]    PLAN:    CNS: No depressants     HEENT: Oral care    PULMONARY:  HOB @ 45 degrees, keep sao2 > 92%    CARDIOVASCULAR: TAPER PRESSORS, 500cc NS bolus if need, on midodrine, goal MAP 60    GI: GI prophylaxis.  GI F/UP. PO Feeding for now. RUQ sono no cholecystitis, reduce lactulose, therapeutic paracentesis when off pressors    RENAL:  Follow up lytes.  Correct as needed. off bicarb, renal f/u    INFECTIOUS DISEASE: PAN cultures, MEROPENEM    HEMATOLOGICAL:  DVT prophylaxis. hem/onc eval for pancytopenia    ENDOCRINE:  Follow up FS.     MUSCULOSKELETAL: out of bed as tolerated    keep ICU IMPRESSION:    Septic shock second to H.influenza bacteremia ?SBP as source cultures neg  History of cirrhosis with ascites second to PBC  Portopulmonary hypertension  Pancytopenia  Chronic Respiratory alkalosis with compensation [likely second to ascites]    PLAN:    CNS: No depressants     HEENT: Oral care    PULMONARY:  HOB @ 45 degrees, keep sao2 > 92%    CARDIOVASCULAR: TAPER PRESSORS, 500cc NS bolus if need, on midodrine, goal MAP 60    GI: GI prophylaxis. PO Feeding for now. RUQ sono no cholecystitis, reduce lactulose, therapeutic paracentesis when off pressors    RENAL:  Follow up lytes.  Correct as needed. Renal f/u    INFECTIOUS DISEASE: bacteremia cleared, Rocephin per ID    HEMATOLOGICAL:  DVT prophylaxis. hem/onc eval for pancytopenia    ENDOCRINE:  Follow up FS.     MUSCULOSKELETAL: out of bed as tolerated    Keep in ICU    Possible transfer to [place] when stable IMPRESSION:    Septic shock second to H.influenza bacteremia still low dose pressors  History of cirrhosis with ascites second to PBC  Portopulmonary hypertension  Pancytopenia      PLAN:    CNS: No depressants     HEENT: Oral care    PULMONARY:  HOB @ 45 degrees, keep sao2 > 92%    CARDIOVASCULAR: TAPER PRESSORS, 500cc NS bolus if need, on midodrine, goal MAP 60    GI: GI prophylaxis. PO Feeding for now. RUQ sono no cholecystitis, reduce lactulose, therapeutic paracentesis when off pressors    RENAL:  Follow up lytes.  Correct as needed. Renal f/u    INFECTIOUS DISEASE: bacteremia cleared, Rocephin per ID    HEMATOLOGICAL:  DVT prophylaxis. hem/onc eval for pancytopenia    ENDOCRINE:  Follow up FS.     MUSCULOSKELETAL: out of bed as tolerated    Keep in ICU    Possible transfer to [place] when stable

## 2019-01-18 NOTE — PROGRESS NOTE ADULT - SUBJECTIVE AND OBJECTIVE BOX
SUBJECTIVE:    Patient is a 58y old Female who presents with a chief complaint of abdominal pain for 1.5 days (2019 10:13)    Currently admitted to medicine with the primary diagnosis of decompensation of cirrhosis      Today is hospital day 3d. Patient is asymptomatic. She denies any headache, chest pain, dyspnea, abdominal pain. We will wean off Levophed today and give 500cc bolus PRN. Hematology/oncology consulted for pancytopenia.     PAST MEDICAL & SURGICAL HISTORY  Splenomegaly  Hypotension  GERD (gastroesophageal reflux disease)  Liver cirrhosis  History of appendectomy  History of tonsillectomy    SOCIAL HISTORY:  Denies alcohol and illicit drug use  Former smoker, quit 2 months ago, 3-4 cigarettes for 35 years     From (X ) home ( ) nursing home ( ) other   Ambulation status: (X ) ambulates independently ( ) ambulates with assistance ( ) ambulates with device ( ) dependent   Device: ( ) rolling walker ( ) cane     ALLERGIES:  No Known Allergies    MEDICATIONS:  STANDING MEDICATIONS  cefTRIAXone   IVPB 2 Gram(s) IV Intermittent every 24 hours  chlorhexidine 4% Liquid 1 Application(s) Topical <User Schedule>  enoxaparin Injectable 40 milliGRAM(s) SubCutaneous daily  lactulose Syrup 20 Gram(s) Oral two times a day  levothyroxine 50 MICROGram(s) Oral daily  midodrine 10 milliGRAM(s) Oral three times a day  norepinephrine Infusion 0.05 MICROgram(s)/kG/Min IV Continuous <Continuous>  pantoprazole    Tablet 40 milliGRAM(s) Oral two times a day  ursodiol Capsule 300 milliGRAM(s) Oral every 8 hours    PRN MEDICATIONS  morphine  - Injectable 1 milliGRAM(s) IV Push every 6 hours PRN    VITALS:   T(F): 97.1  HR: 64  BP: 90/57  RR: 23  SpO2: 98%    LABS:                        8.4    2.41  )-----------( 43       ( 2019 05:23 )             27.6     01-18    138  |  106  |  12  ----------------------------<  101<H>  4.2   |  20  |  0.7    Ca    7.6<L>      2019 05:23  Phos  1.7     -18  Mg     1.9     -18    TPro  7.3  /  Alb  2.5<L>  /  TBili  1.2  /  DBili  x   /  AST  32  /  ALT  14  /  AlkPhos  84  01-18    PT/INR - ( 2019 05:23 )   PT: 18.40 sec;   INR: 1.61 ratio         PTT - ( 2019 05:23 )  PTT:38.3 sec  Urinalysis Basic - ( 2019 17:30 )    Color: Dark Yellow / Appearance: Cloudy / S.020 / pH: x  Gluc: x / Ketone: Negative  / Bili: Negative / Urobili: 0.2 mg/dL   Blood: x / Protein: Negative mg/dL / Nitrite: Negative   Leuk Esterase: Negative / RBC: x / WBC 1-2 /HPF   Sq Epi: x / Non Sq Epi: x / Bacteria: Few /HPF    ABG - ( 2019 16:35 )  pH, Arterial: 7.50  pH, Blood: x     /  pCO2: 23    /  pO2: 97    / HCO3: 18    / Base Excess: -4.3  /  SaO2: 99        Culture - Urine (collected 2019 09:50)  Source: .Urine Clean Catch (Midstream)  Final Report (2019 21:56):    No growth    Culture - Blood (collected 15 [place] 2019 16:47)  Source: .Blood None  Preliminary Report (2019 23:01):    No growth to date.    Culture - Body Fluid with Gram Stain (collected 15 [place] 2019 12:56)  Source: .Body Fluid Peritoneal Fluid  Gram Stain (2019 03:00):    polymorphonuclear leukocytes seen    No organisms seen    by cytocentrifuge  Preliminary Report (2019 18:43):    No growth    CARDIAC MARKERS ( 2019 04:36 )  x     / <0.01 ng/mL / x     / x     / 1.6 ng/mL  CARDIAC MARKERS ( 2019 16:45 )  x     / 0.01 ng/mL / x     / x     / 2.5 ng/mL    RADIOLOGY:  CXR  Impression:    Left lung opacification, atelectatic in nature.  Follow-up as needed.    PHYSICAL EXAM:  GEN: No acute distress, lying comfortably in bed   LUNGS: Clear to auscultation bilaterally, no labored breathing   HEART: S1/S2 present. RRR.   ABD: Soft, non-tender, minimally distended. Bowel sounds present.   EXT: Noncyanotic, nonedematous, 2+ peripheral pulses, skin intact. No asterixis.   NEURO: AAOX3, CN II-XII grossly intact     Lines/Tubes   Peripheral IV access

## 2019-01-19 LAB
-  AMOXICILLIN/CLAVULANIC ACID: SIGNIFICANT CHANGE UP
-  AMPICILLIN/SULBACTAM: SIGNIFICANT CHANGE UP
-  AMPICILLIN: SIGNIFICANT CHANGE UP
-  CEFTRIAXONE: SIGNIFICANT CHANGE UP
-  CIPROFLOXACIN: SIGNIFICANT CHANGE UP
-  MEROPENEM: SIGNIFICANT CHANGE UP
ALBUMIN SERPL ELPH-MCNC: 2.2 G/DL — LOW (ref 3.5–5.2)
ALP SERPL-CCNC: 83 U/L — SIGNIFICANT CHANGE UP (ref 30–115)
ALT FLD-CCNC: 12 U/L — SIGNIFICANT CHANGE UP (ref 0–41)
ANION GAP SERPL CALC-SCNC: 15 MMOL/L — HIGH (ref 7–14)
AST SERPL-CCNC: 31 U/L — SIGNIFICANT CHANGE UP (ref 0–41)
BASOPHILS # BLD AUTO: 0.01 K/UL — SIGNIFICANT CHANGE UP (ref 0–0.2)
BASOPHILS NFR BLD AUTO: 0.6 % — SIGNIFICANT CHANGE UP (ref 0–1)
BILIRUB SERPL-MCNC: 0.9 MG/DL — SIGNIFICANT CHANGE UP (ref 0.2–1.2)
BLD GP AB SCN SERPL QL: ABNORMAL
BUN SERPL-MCNC: 13 MG/DL — SIGNIFICANT CHANGE UP (ref 10–20)
CALCIUM SERPL-MCNC: 7.6 MG/DL — LOW (ref 8.5–10.1)
CHLORIDE SERPL-SCNC: 105 MMOL/L — SIGNIFICANT CHANGE UP (ref 98–110)
CO2 SERPL-SCNC: 17 MMOL/L — SIGNIFICANT CHANGE UP (ref 17–32)
CREAT SERPL-MCNC: 0.7 MG/DL — SIGNIFICANT CHANGE UP (ref 0.7–1.5)
CULTURE RESULTS: SIGNIFICANT CHANGE UP
CULTURE RESULTS: SIGNIFICANT CHANGE UP
EOSINOPHIL # BLD AUTO: 0.06 K/UL — SIGNIFICANT CHANGE UP (ref 0–0.7)
EOSINOPHIL NFR BLD AUTO: 3.4 % — SIGNIFICANT CHANGE UP (ref 0–8)
GLUCOSE SERPL-MCNC: 85 MG/DL — SIGNIFICANT CHANGE UP (ref 70–99)
HCT VFR BLD CALC: 24.3 % — LOW (ref 37–47)
HGB BLD-MCNC: 7.5 G/DL — LOW (ref 12–16)
IMM GRANULOCYTES NFR BLD AUTO: 2.3 % — HIGH (ref 0.1–0.3)
LYMPHOCYTES # BLD AUTO: 0.67 K/UL — LOW (ref 1.2–3.4)
LYMPHOCYTES # BLD AUTO: 38.3 % — SIGNIFICANT CHANGE UP (ref 20.5–51.1)
MAGNESIUM SERPL-MCNC: 1.8 MG/DL — SIGNIFICANT CHANGE UP (ref 1.8–2.4)
MCHC RBC-ENTMCNC: 28.5 PG — SIGNIFICANT CHANGE UP (ref 27–31)
MCHC RBC-ENTMCNC: 30.9 G/DL — LOW (ref 32–37)
MCV RBC AUTO: 92.4 FL — SIGNIFICANT CHANGE UP (ref 81–99)
METHOD TYPE: SIGNIFICANT CHANGE UP
MONOCYTES # BLD AUTO: 0.22 K/UL — SIGNIFICANT CHANGE UP (ref 0.1–0.6)
MONOCYTES NFR BLD AUTO: 12.6 % — HIGH (ref 1.7–9.3)
NEUTROPHILS # BLD AUTO: 0.75 K/UL — LOW (ref 1.4–6.5)
NEUTROPHILS NFR BLD AUTO: 42.8 % — SIGNIFICANT CHANGE UP (ref 42.2–75.2)
NRBC # BLD: 0 /100 WBCS — SIGNIFICANT CHANGE UP (ref 0–0)
ORGANISM # SPEC MICROSCOPIC CNT: SIGNIFICANT CHANGE UP
PHOSPHATE SERPL-MCNC: 2.3 MG/DL — SIGNIFICANT CHANGE UP (ref 2.1–4.9)
PLATELET # BLD AUTO: 32 K/UL — LOW (ref 130–400)
POTASSIUM SERPL-MCNC: 4 MMOL/L — SIGNIFICANT CHANGE UP (ref 3.5–5)
POTASSIUM SERPL-SCNC: 4 MMOL/L — SIGNIFICANT CHANGE UP (ref 3.5–5)
PROT SERPL-MCNC: 6.9 G/DL — SIGNIFICANT CHANGE UP (ref 6–8)
RBC # BLD: 2.63 M/UL — LOW (ref 4.2–5.4)
RBC # FLD: 21.6 % — HIGH (ref 11.5–14.5)
SODIUM SERPL-SCNC: 137 MMOL/L — SIGNIFICANT CHANGE UP (ref 135–146)
SPECIMEN SOURCE: SIGNIFICANT CHANGE UP
SPECIMEN SOURCE: SIGNIFICANT CHANGE UP
TYPE + AB SCN PNL BLD: SIGNIFICANT CHANGE UP
WBC # BLD: 1.75 K/UL — LOW (ref 4.8–10.8)
WBC # FLD AUTO: 1.75 K/UL — LOW (ref 4.8–10.8)

## 2019-01-19 PROCEDURE — 93970 EXTREMITY STUDY: CPT | Mod: 26

## 2019-01-19 RX ORDER — FUROSEMIDE 40 MG
40 TABLET ORAL DAILY
Qty: 0 | Refills: 0 | Status: DISCONTINUED | OUTPATIENT
Start: 2019-01-19 | End: 2019-01-25

## 2019-01-19 RX ORDER — SPIRONOLACTONE 25 MG/1
10 TABLET, FILM COATED ORAL DAILY
Qty: 0 | Refills: 0 | Status: DISCONTINUED | OUTPATIENT
Start: 2019-01-19 | End: 2019-01-19

## 2019-01-19 RX ORDER — SPIRONOLACTONE 25 MG/1
12.5 TABLET, FILM COATED ORAL DAILY
Qty: 0 | Refills: 0 | Status: DISCONTINUED | OUTPATIENT
Start: 2019-01-19 | End: 2019-01-25

## 2019-01-19 RX ADMIN — CEFTRIAXONE 100 GRAM(S): 500 INJECTION, POWDER, FOR SOLUTION INTRAMUSCULAR; INTRAVENOUS at 12:47

## 2019-01-19 RX ADMIN — LACTULOSE 20 GRAM(S): 10 SOLUTION ORAL at 17:12

## 2019-01-19 RX ADMIN — URSODIOL 300 MILLIGRAM(S): 250 TABLET, FILM COATED ORAL at 05:25

## 2019-01-19 RX ADMIN — Medication 40 MILLIGRAM(S): at 12:47

## 2019-01-19 RX ADMIN — PANTOPRAZOLE SODIUM 40 MILLIGRAM(S): 20 TABLET, DELAYED RELEASE ORAL at 05:26

## 2019-01-19 RX ADMIN — Medication 50 MICROGRAM(S): at 05:25

## 2019-01-19 RX ADMIN — MIDODRINE HYDROCHLORIDE 10 MILLIGRAM(S): 2.5 TABLET ORAL at 05:25

## 2019-01-19 RX ADMIN — URSODIOL 300 MILLIGRAM(S): 250 TABLET, FILM COATED ORAL at 13:23

## 2019-01-19 RX ADMIN — LACTULOSE 20 GRAM(S): 10 SOLUTION ORAL at 05:26

## 2019-01-19 RX ADMIN — SPIRONOLACTONE 12.5 MILLIGRAM(S): 25 TABLET, FILM COATED ORAL at 13:59

## 2019-01-19 RX ADMIN — URSODIOL 300 MILLIGRAM(S): 250 TABLET, FILM COATED ORAL at 21:19

## 2019-01-19 RX ADMIN — MIDODRINE HYDROCHLORIDE 10 MILLIGRAM(S): 2.5 TABLET ORAL at 13:23

## 2019-01-19 RX ADMIN — PANTOPRAZOLE SODIUM 40 MILLIGRAM(S): 20 TABLET, DELAYED RELEASE ORAL at 17:12

## 2019-01-19 RX ADMIN — ENOXAPARIN SODIUM 40 MILLIGRAM(S): 100 INJECTION SUBCUTANEOUS at 12:48

## 2019-01-19 RX ADMIN — MIDODRINE HYDROCHLORIDE 10 MILLIGRAM(S): 2.5 TABLET ORAL at 21:19

## 2019-01-19 NOTE — CHART NOTE - NSCHARTNOTEFT_GEN_A_CORE
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 In the ED, she was found to have a fever of 103F and hypotension. IVFs and IV broad spectrum antibiotics were initiated, therapeutic paracentesis performed for rule out SBP, and patient was brought up to CCU for continued monitoring. She temporarily required Levophed for BP maintenance. Ascitic fluid negative for evidence of SBP, no growth on culture. 2 blood cultures were positive for H. influenzae. Patient was seen and assessed by GI team, with no acute intervention. She is currently off pressors, afebrile, and medically stable to be downgraded to the medical floor. The patient is to be transferred to [facility] under Dr. Dean (gastroenterologist) for continued care and completion of workup for liver transplant.     Please follow up Doppler of LEs.     ICU Vital Signs Last 24 Hrs  T(C): 36.7 (19 Jan 2019 09:00), Max: 37.1 (18 Jan 2019 16:00)  T(F): 98 (19 Jan 2019 09:00), Max: 98.8 (18 Jan 2019 16:00)  HR: 72 (19 Jan 2019 10:00) (64 - 84)  BP: 95/65 (19 Jan 2019 10:00) (78/45 - 95/65)  BP(mean): 73 (19 Jan 2019 10:00) (53 - 74)  ABP: --  ABP(mean): --  RR: 27 (19 Jan 2019 10:00) (20 - 28)  SpO2: 97% (19 Jan 2019 10:00) (91% - 100%)    PHYSICAL EXAM  GENERAL: No acute distress, well-developed  HEAD: Atraumatic, Normocephalic  EYES: EOMI, PERRLA, conjunctiva and sclera clear  NECK: Supple, no lymphadenopathy, no JVD  CHEST/LUNG: CTAB; No wheezes, rales, or rhonchi  HEART: Regular rate and rhythm; No murmurs, rubs, or gallops  ABDOMEN: Soft, non-tender, distended; normal bowel sounds, no organomegaly  EXTREMITIES: 2+ peripheral pulses b/l, No clubbing, cyanosis, or edema. No asterixis.   NEUROLOGY: A&O x 3, no focal deficits  SKIN: No rashes or lesions    ASSESSMENT/PLAN    #Acute Decompensation of Liver Cirrhosis s/p therapeutic paracentesis; Ascites; Portopulmonary HTN; Pancytopenia  -Followed by Dr. Dean at [facility] (information in provider handoff), on transplant list awaiting completion of cardiac workup  -Medically cleared for downgrade to medical floor, [id number] transfer to [facility]  -Continue ursodiol and lactulose  -Lasix 40 and Aldactone 10 if MAP > 60   -Ascitic fluid negative for evidence of SBP  -Therapeutic paracentesis as needed    #Septic Shock  -Blood cx positive for H. influenza x 2  -ID following  -Continue Rocephin  -Follow up repeat blood cultures     #Pancytopenia 2/2 Cirrhosis, chronic  -Evaluated by heme/onc (see full note)   -Active T&S    #Hypothyroidism  -Continue Synthroid    #Hypotension  -Patient runs in the 80s and 90s systolic. Continue home midodrine.     #Recent L1 Compression Fracture s/p trauma  -Per neurosurgery, no intervention     DVT ppx: Lovenox sub-q  GI ppx: Protonix   Diet: DASH/TLC, fluid restriction < 1.5 L/day   Activity: Increase as tolerated   Dispo: downgrade to medical floor, transfer to [facility]   Code status: FULL CODE

## 2019-01-19 NOTE — PROGRESS NOTE ADULT - ASSESSMENT
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    Decompensated liver cirrhosis on transplant list  Sepsis secondary to SBP secondary to H influenza  No PNA  No pyelonephritis  No PE  No meningitis  Repeat BCx NTD  Pt with pancytopenia, thrombocytopenia, lactic acidosis, hypomagnesemia,     Tmax 98.8 with wbc 1.75 (dropping)    On cefTRIAXone   IVPB 2 Gram(s) IV Intermittent every 24 hours    RECOMMENDATIONS:  Repeat B/C x2  Continue Rocephin 2 gm iv q24h  Monitor wbc

## 2019-01-19 NOTE — PROGRESS NOTE ADULT - SUBJECTIVE AND OBJECTIVE BOX
OVERNIGHT EVENTS: off pressors, + balance, no major events    Vital Signs Last 24 Hrs  T(C): 36.7 (2019 09:00), Max: 37.1 (2019 16:00)  T(F): 98 (2019 09:00), Max: 98.8 (2019 16:00)  HR: 72 (2019 10:00) (64 - 84)  BP: 95/65 (2019 10:00) (78/45 - 95/65)  BP(mean): 73 (2019 10:00) (53 - 74)  RR: 27 (2019 10:00) (20 - 28)  SpO2: 97% (2019 10:00) (91% - 100%)    PHYSICAL EXAMINATION:    GENERAL: axox3    HEENT: Head is normocephalic and atraumatic. Extraocular muscles are intact. Mucous membranes are moist.    NECK: Supple.    LUNGS: dec bs both bases    HEART: Regular rate and rhythm without murmur.    ABDOMEN: Soft,distended    EXTREMITIES: Without any cyanosis, clubbing, rash, lesions or edema.    NEUROLOGIC: Grossly intact.    SKIN: No ulceration or induration present.      LABS:                        7.5    1.75  )-----------( 32       ( 2019 04:30 )             24.3         137  |  105  |  13  ----------------------------<  85  4.0   |  17  |  0.7    Ca    7.6<L>      2019 04:30  Phos  2.3       Mg     1.8         TPro  6.9  /  Alb  2.2<L>  /  TBili  0.9  /  DBili  x   /  AST  31  /  ALT  12  /  AlkPhos  83  19    PT/INR - ( 2019 05:23 )   PT: 18.40 sec;   INR: 1.61 ratio         PTT - ( 2019 05:23 )  PTT:38.3 sec  Urinalysis Basic - ( 2019 17:30 )    Color: Dark Yellow / Appearance: Cloudy / S.020 / pH: x  Gluc: x / Ketone: Negative  / Bili: Negative / Urobili: 0.2 mg/dL   Blood: x / Protein: Negative mg/dL / Nitrite: Negative   Leuk Esterase: Negative / RBC: x / WBC 1-2 /HPF   Sq Epi: x / Non Sq Epi: x / Bacteria: Few /HPF      ABG - ( 2019 16:35 )  pH, Arterial: 7.50  pH, Blood: x     /  pCO2: 23    /  pO2: 97    / HCO3: 18    / Base Excess: -4.3  /  SaO2: 99                                19 @ 07:01  -  19 @ 07:00  --------------------------------------------------------  IN: 2130 mL / OUT: 1050 mL / NET: 1080 mL        MICROBIOLOGY:  Culture Results:   No growth ( @ 17:30)  Culture Results:   No growth ( @ 09:50)  Culture Results:   No growth to date. (01-15 @ 16:47)  Culture Results:   No growth (01-15 @ 12:56)      MEDICATIONS  (STANDING):  cefTRIAXone   IVPB 2 Gram(s) IV Intermittent every 24 hours  chlorhexidine 4% Liquid 1 Application(s) Topical <User Schedule>  enoxaparin Injectable 40 milliGRAM(s) SubCutaneous daily  lactulose Syrup 20 Gram(s) Oral two times a day  levothyroxine 50 MICROGram(s) Oral daily  midodrine 10 milliGRAM(s) Oral three times a day  norepinephrine Infusion 0.05 MICROgram(s)/kG/Min (5.953 mL/Hr) IV Continuous <Continuous>  pantoprazole    Tablet 40 milliGRAM(s) Oral two times a day  ursodiol Capsule 300 milliGRAM(s) Oral every 8 hours    MEDICATIONS  (PRN):  morphine  - Injectable 1 milliGRAM(s) IV Push every 6 hours PRN Severe Pain (7 - 10)      RADIOLOGY & ADDITIONAL STUDIES:

## 2019-01-19 NOTE — CHART NOTE - NSCHARTNOTEFT_GEN_A_CORE
I was signed out that this patient should be transferred to [place] for further management and that it should be Attending to Attending transfer. I spoke to Dr. Ramirez about this transfer but he suggested contacting GI attending. All efforts to reach the GI attending or fellow was futile. I placed multiple calls to the fellows spectra link [phone], but nobody answered. The  later called Albina on [phone] and he promised to get back to me but never did.

## 2019-01-19 NOTE — PROGRESS NOTE ADULT - SUBJECTIVE AND OBJECTIVE BOX
infectious diseases progress note:  TOO EPSTEIN is a 58yFemale patient    CHEST PAIN; ABDOMINAL PAIN; NAUSEA AND VOMITING        ROS:  CONSTITUTIONAL:  Negative fever or chills, feels well, good appetite  EYES:  Negative  blurry vision or double vision  CARDIOVASCULAR:  Negative for chest pain or palpitations  RESPIRATORY:  Negative for cough, wheezing, or SOB   GASTROINTESTINAL:  Negative for nausea, vomiting, diarrhea, constipation, or abdominal pain  GENITOURINARY:  Negative frequency, urgency or dysuria  NEUROLOGIC:  No headache, confusion, dizziness, lightheadedness    Allergies    No Known Allergies    Intolerances        ANTIBIOTICS/RELEVANT:  antimicrobials  cefTRIAXone   IVPB 2 Gram(s) IV Intermittent every 24 hours    immunologic:    OTHER:  chlorhexidine 4% Liquid 1 Application(s) Topical <User Schedule>  enoxaparin Injectable 40 milliGRAM(s) SubCutaneous daily  lactulose Syrup 20 Gram(s) Oral two times a day  levothyroxine 50 MICROGram(s) Oral daily  midodrine 10 milliGRAM(s) Oral three times a day  morphine  - Injectable 1 milliGRAM(s) IV Push every 6 hours PRN  norepinephrine Infusion 0.05 MICROgram(s)/kG/Min IV Continuous <Continuous>  pantoprazole    Tablet 40 milliGRAM(s) Oral two times a day  ursodiol Capsule 300 milliGRAM(s) Oral every 8 hours      Objective:  T(F): 98 (19 @ 08:00), Max: 98.8 (19 @ 16:00)  HR: 71 (19 @ 08:00) (64 - 84)  BP: 92/67 (19 @ 08:00) (78/45 - 95/65)  RR: 20 (19 @ 08:00) (20 - 28)  SpO2: 91% (19 @ 08:00) (91% - 100%)    PHYSICAL EXAM  Eyes:HEMA, EOMI  Ear/Nose/Throat: no oral lesion, no sinus tenderness on percussion	  Neck:no JVD, no lymphadenopathy, supple  Respiratory: CTA sanjuanita  Cardiovascular: S1S2 RRR, no murmurs  Gastrointestinal ascites  Extremities:no e/e/c        137  |  105  |  13  ----------------------------<  85  4.0   |  17  |  0.7    Mg     1.8         TPro  6.9  /  Alb  2.2<L>  /  TBili  0.9  /  DBili  x   /  AST  31  /  ALT  12  /  AlkPhos  83      Vancomycin Level, Trough: 12.3 ug/mL (19 @ 16:30)  <--<9>>7.50                          7.5    1.75  )-----------( 32       ( 2019 04:30 )             24.3     Urinalysis Basic - ( 2019 17:30 )    Color: Dark Yellow / Appearance: Cloudy / S.020 / pH: x  Gluc: x / Ketone: Negative  / Bili: Negative / Urobili: 0.2 mg/dL   Blood: x / Protein: Negative mg/dL / Nitrite: Negative   Leuk Esterase: Negative / RBC: x / WBC 1-2 /HPF   Sq Epi: x / Non Sq Epi: x / Bacteria: Few /HPF      PT/INR - ( 2019 05:23 )   PT: 18.40 sec;   INR: 1.61 ratio         PTT - ( 2019 05:23 )  PTT:38.3 sec    Culture - Urine (collected 2019 17:30)  Source: .Urine Clean Catch (Midstream)  Final Report (2019 22:41):    No growth    Culture - Urine (collected 2019 09:50)  Source: .Urine Clean Catch (Midstream)  Final Report (2019 21:56):    No growth    Culture - Blood (collected 15 [place] 2019 16:47)  Source: .Blood None  Preliminary Report (2019 23:01):    No growth to date.    Culture - Body Fluid with Gram Stain (collected 15 [place] 2019 12:56)  Source: .Body Fluid Peritoneal Fluid  Gram Stain (2019 03:00):    polymorphonuclear leukocytes seen    No organisms seen    by cytocentrifuge  Preliminary Report (2019 18:43):    No growth    Culture - Blood (collected 15 [place] 2019 07:41)  Source: .Blood Blood-Peripheral  Gram Stain (2019 04:59):    Growth in aerobic bottle: Gram Negative Rods  Preliminary Report (2019 04:59):    Growth in aerobic bottle: Gram Negative Rods    Culture - Blood (collected 15 [place] 2019 07:41)  Source: .Blood Blood-Peripheral  Gram Stain (2019 07:15):    Growth in aerobic bottle:    Gram Negative Rods  Preliminary Report (2019 12:16):    Growth in aerobic bottle: Haemophilus influenzae    ***Blood Panel PCR results on this specimen are available    approximately 3 hours after the Gram stain result.***    Gram stain, PCR, and/or culture results may not always    correspond due to differencein methodologies.    ************************************************************    This PCR assay was performed using [employer].    The following targets are tested for: Enterococcus,    vancomycin resistant enterococci, Listeria monocytogenes,    coagulase negative staphylococci, S. aureus,    methicillin resistant S. aureus, Streptococcus agalactiae    (Group B), S. pneumoniae, S. pyogenes (Group A),    Acinetobacter baumannii, Enterobacter cloacae, E. coli,    Klebsiella oxytoca, K. pneumoniae, Proteus sp.,    Serratia marcescens, Haemophilus influenzae,    Neisseria meningitidis, Pseudomonas aeruginosa, Candida    albicans, C. glabrata, C krusei, C parapsilosis,    C. tropicalis and the KPC resistance gene.  Organism: Blood Culture PCR (2019 08:35)  Organism: Blood Culture PCR (2019 08:35)      -  Haemophilus influenzae: Detec      Method Type: PCR

## 2019-01-20 LAB
ALBUMIN SERPL ELPH-MCNC: 2.6 G/DL — LOW (ref 3.5–5.2)
ALP SERPL-CCNC: 86 U/L — SIGNIFICANT CHANGE UP (ref 30–115)
ALT FLD-CCNC: 13 U/L — SIGNIFICANT CHANGE UP (ref 0–41)
ANION GAP SERPL CALC-SCNC: 14 MMOL/L — SIGNIFICANT CHANGE UP (ref 7–14)
APTT BLD: 40.4 SEC — HIGH (ref 27–39.2)
AST SERPL-CCNC: 35 U/L — SIGNIFICANT CHANGE UP (ref 0–41)
BASOPHILS # BLD AUTO: 0 K/UL — SIGNIFICANT CHANGE UP (ref 0–0.2)
BASOPHILS NFR BLD AUTO: 0 % — SIGNIFICANT CHANGE UP (ref 0–1)
BILIRUB SERPL-MCNC: 1.3 MG/DL — HIGH (ref 0.2–1.2)
BUN SERPL-MCNC: 13 MG/DL — SIGNIFICANT CHANGE UP (ref 10–20)
CALCIUM SERPL-MCNC: 8 MG/DL — LOW (ref 8.5–10.1)
CHLORIDE SERPL-SCNC: 103 MMOL/L — SIGNIFICANT CHANGE UP (ref 98–110)
CO2 SERPL-SCNC: 21 MMOL/L — SIGNIFICANT CHANGE UP (ref 17–32)
CREAT SERPL-MCNC: 0.8 MG/DL — SIGNIFICANT CHANGE UP (ref 0.7–1.5)
CULTURE RESULTS: SIGNIFICANT CHANGE UP
CULTURE RESULTS: SIGNIFICANT CHANGE UP
EOSINOPHIL # BLD AUTO: 0.04 K/UL — SIGNIFICANT CHANGE UP (ref 0–0.7)
EOSINOPHIL NFR BLD AUTO: 2.2 % — SIGNIFICANT CHANGE UP (ref 0–8)
GLUCOSE SERPL-MCNC: 93 MG/DL — SIGNIFICANT CHANGE UP (ref 70–99)
HCT VFR BLD CALC: 27.8 % — LOW (ref 37–47)
HGB BLD-MCNC: 8.4 G/DL — LOW (ref 12–16)
IMM GRANULOCYTES NFR BLD AUTO: 1.1 % — HIGH (ref 0.1–0.3)
INR BLD: 1.57 RATIO — HIGH (ref 0.65–1.3)
LYMPHOCYTES # BLD AUTO: 0.56 K/UL — LOW (ref 1.2–3.4)
LYMPHOCYTES # BLD AUTO: 30.6 % — SIGNIFICANT CHANGE UP (ref 20.5–51.1)
MAGNESIUM SERPL-MCNC: 1.7 MG/DL — LOW (ref 1.8–2.4)
MCHC RBC-ENTMCNC: 27.8 PG — SIGNIFICANT CHANGE UP (ref 27–31)
MCHC RBC-ENTMCNC: 30.2 G/DL — LOW (ref 32–37)
MCV RBC AUTO: 92.1 FL — SIGNIFICANT CHANGE UP (ref 81–99)
MONOCYTES # BLD AUTO: 0.16 K/UL — SIGNIFICANT CHANGE UP (ref 0.1–0.6)
MONOCYTES NFR BLD AUTO: 8.7 % — SIGNIFICANT CHANGE UP (ref 1.7–9.3)
NEUTROPHILS # BLD AUTO: 1.05 K/UL — LOW (ref 1.4–6.5)
NEUTROPHILS NFR BLD AUTO: 57.4 % — SIGNIFICANT CHANGE UP (ref 42.2–75.2)
NRBC # BLD: 0 /100 WBCS — SIGNIFICANT CHANGE UP (ref 0–0)
PLATELET # BLD AUTO: 33 K/UL — LOW (ref 130–400)
POTASSIUM SERPL-MCNC: 3.9 MMOL/L — SIGNIFICANT CHANGE UP (ref 3.5–5)
POTASSIUM SERPL-SCNC: 3.9 MMOL/L — SIGNIFICANT CHANGE UP (ref 3.5–5)
PROT SERPL-MCNC: 7.8 G/DL — SIGNIFICANT CHANGE UP (ref 6–8)
PROTHROM AB SERPL-ACNC: 18 SEC — HIGH (ref 9.95–12.87)
RBC # BLD: 3.02 M/UL — LOW (ref 4.2–5.4)
RBC # FLD: 21.7 % — HIGH (ref 11.5–14.5)
SODIUM SERPL-SCNC: 138 MMOL/L — SIGNIFICANT CHANGE UP (ref 135–146)
SPECIMEN SOURCE: SIGNIFICANT CHANGE UP
SPECIMEN SOURCE: SIGNIFICANT CHANGE UP
WBC # BLD: 1.83 K/UL — LOW (ref 4.8–10.8)
WBC # FLD AUTO: 1.83 K/UL — LOW (ref 4.8–10.8)

## 2019-01-20 RX ADMIN — MIDODRINE HYDROCHLORIDE 10 MILLIGRAM(S): 2.5 TABLET ORAL at 21:00

## 2019-01-20 RX ADMIN — PANTOPRAZOLE SODIUM 40 MILLIGRAM(S): 20 TABLET, DELAYED RELEASE ORAL at 17:15

## 2019-01-20 RX ADMIN — ENOXAPARIN SODIUM 40 MILLIGRAM(S): 100 INJECTION SUBCUTANEOUS at 11:40

## 2019-01-20 RX ADMIN — MIDODRINE HYDROCHLORIDE 10 MILLIGRAM(S): 2.5 TABLET ORAL at 05:26

## 2019-01-20 RX ADMIN — LACTULOSE 20 GRAM(S): 10 SOLUTION ORAL at 17:14

## 2019-01-20 RX ADMIN — Medication 50 MICROGRAM(S): at 05:29

## 2019-01-20 RX ADMIN — SPIRONOLACTONE 12.5 MILLIGRAM(S): 25 TABLET, FILM COATED ORAL at 05:26

## 2019-01-20 RX ADMIN — PANTOPRAZOLE SODIUM 40 MILLIGRAM(S): 20 TABLET, DELAYED RELEASE ORAL at 05:26

## 2019-01-20 RX ADMIN — CHLORHEXIDINE GLUCONATE 1 APPLICATION(S): 213 SOLUTION TOPICAL at 05:28

## 2019-01-20 RX ADMIN — MIDODRINE HYDROCHLORIDE 10 MILLIGRAM(S): 2.5 TABLET ORAL at 13:22

## 2019-01-20 RX ADMIN — LACTULOSE 20 GRAM(S): 10 SOLUTION ORAL at 05:24

## 2019-01-20 RX ADMIN — Medication 40 MILLIGRAM(S): at 05:27

## 2019-01-20 RX ADMIN — URSODIOL 300 MILLIGRAM(S): 250 TABLET, FILM COATED ORAL at 05:27

## 2019-01-20 RX ADMIN — URSODIOL 300 MILLIGRAM(S): 250 TABLET, FILM COATED ORAL at 21:00

## 2019-01-20 RX ADMIN — URSODIOL 300 MILLIGRAM(S): 250 TABLET, FILM COATED ORAL at 13:22

## 2019-01-20 RX ADMIN — CEFTRIAXONE 100 GRAM(S): 500 INJECTION, POWDER, FOR SOLUTION INTRAMUSCULAR; INTRAVENOUS at 11:12

## 2019-01-20 NOTE — PROGRESS NOTE ADULT - ASSESSMENT
#Acute Decompensation of Liver Cirrhosis s/p therapeutic paracentesis; Ascites; Portopulmonary HTN; Pancytopenia  -dw gi , will follow up in regards to planning for transfer to [facility] , will be floor to floor transfer , cw spirinolactone ,     #Septic Shock secondary to h influenza  resolved , cw recophin  latest blood culture negative  id follow up    #. Moderate tricuspid regurgitation.    #Splenomegaly with para splenic varices    #. Mild pulmonic valve regurgitation.    #Chronic diastolic congestive heart failure on furosemide stable    #Right bundle branch block - no acute intervention    #Pancytopenia 2/2 Cirrhosis, chronic  stable     #Hypothyroidism  -Continue Synthroid    #Hypotension  -stable on midodrine    #Recent L1 Compression Fracture s/p trauma  -Per neurosurgery, no intervention

## 2019-01-20 NOTE — PROGRESS NOTE ADULT - SUBJECTIVE AND OBJECTIVE BOX
[facility]STEIN  58[facility] [address]      Patient is a 58y old  Female who presents with a chief complaint of abdominal pain for 1.5 days (19 Jan 2019 10:39)      INTERVAL HPI/OVERNIGHT EVENTS:  no acute events overnight       REVIEW OF SYSTEMS:  ROS negtive for acute complaints  FAMILY HISTORY:  Family history of diabetes mellitus (Sibling)  Family history of heart disease (Father)    T(C): 36.7 (01-20-19 @ 05:00), Max: 36.8 (01-19-19 @ 15:33)  HR: 67 (01-20-19 @ 07:56) (66 - 84)  BP: 91/53 (01-20-19 @ 07:56) (86/50 - 136/73)  RR: 18 (01-20-19 @ 05:00) (18 - 25)  SpO2: 98% (01-20-19 @ 11:28) (91% - 98%)  Wt(kg): --Vital Signs Last 24 Hrs  T(C): 36.7 (20 Jan 2019 05:00), Max: 36.8 (19 Jan 2019 15:33)  T(F): 98 (20 Jan 2019 05:00), Max: 98.3 (19 Jan 2019 15:33)  HR: 67 (20 Jan 2019 07:56) (66 - 84)  BP: 91/53 (20 Jan 2019 07:56) (86/50 - 136/73)  BP(mean): 75 (19 Jan 2019 13:00) (75 - 75)  RR: 18 (20 Jan 2019 05:00) (18 - 25)  SpO2: 98% (20 Jan 2019 11:28) (91% - 98%)    PHYSICAL EXAM:  GENERAL: NAD, well-groomed, well-developed  HEAD:  Atraumatic, Normocephalic  EYES: EOMI, PERRLA, conjunctiva and sclera clear  ENMT: No tonsillar erythema, exudates, or enlargement; Moist mucous membranes, Good dentition, No lesions  NECK: Supple, No JVD, Normal thyroid  NERVOUS SYSTEM:  Alert & Oriented X3, Good concentration;   PULM: Clear to auscultation bilaterally  CARDIAC: Regular rate and rhythm; No murmurs, rubs, or gallops  GI: Distended , soft   EXTREMITIES:  2+ Peripheral Pulses, No clubbing, cyanosis, or edema  LYMPH: No lymphadenopathy noted  SKIN: No rashes or lesions    Consultant(s) Notes Reviewed:  [x ] YES  [ ] NO  Care Discussed with Consultants/Other Providers [ x] YES  [ ] NO    LABS:                            8.4    1.83  )-----------( 33       ( 20 Jan 2019 04:30 )             27.8   01-20    138  |  103  |  13  ----------------------------<  93  3.9   |  21  |  0.8    Ca    8.0<L>      20 Jan 2019 04:30  Phos  2.3     01-19  Mg     1.7     01-20    TPro  7.8  /  Alb  2.6<L>  /  TBili  1.3<H>  /  DBili  x   /  AST  35  /  ALT  13  /  AlkPhos  86  01-20        Culture - Blood (collected 18 Jan 2019 05:23)  Source: .Blood None  Preliminary Report (19 Jan 2019 13:01):    No growth to date.    Culture - Urine (collected 17 Jan 2019 17:30)  Source: .Urine Clean Catch (Midstream)  Final Report (18 Jan 2019 22:41):    No growth      cefTRIAXone   IVPB 2 Gram(s) IV Intermittent every 24 hours  chlorhexidine 4% Liquid 1 Application(s) Topical <User Schedule>  enoxaparin Injectable 40 milliGRAM(s) SubCutaneous daily  furosemide    Tablet 40 milliGRAM(s) Oral daily  lactulose Syrup 20 Gram(s) Oral two times a day  levothyroxine 50 MICROGram(s) Oral daily  midodrine 10 milliGRAM(s) Oral three times a day  morphine  - Injectable 1 milliGRAM(s) IV Push every 6 hours PRN  pantoprazole    Tablet 40 milliGRAM(s) Oral two times a day  spironolactone 12.5 milliGRAM(s) Oral daily  ursodiol Capsule 300 milliGRAM(s) Oral every 8 hours      HEALTH ISSUES - PROBLEM Dx:          Case Discussed with House Staff   45 minutes spent on total encounter; more than 50% of the visit was spent counseling and/or coordinating care by the attending physician.   Spectra [phone]

## 2019-01-21 LAB
ALBUMIN SERPL ELPH-MCNC: 2.6 G/DL — LOW (ref 3.5–5.2)
ALLERGY+IMMUNOLOGY DIAG STUDY NOTE: SIGNIFICANT CHANGE UP
ALP SERPL-CCNC: 89 U/L — SIGNIFICANT CHANGE UP (ref 30–115)
ALT FLD-CCNC: 13 U/L — SIGNIFICANT CHANGE UP (ref 0–41)
ANION GAP SERPL CALC-SCNC: 15 MMOL/L — HIGH (ref 7–14)
APTT BLD: 40.2 SEC — HIGH (ref 27–39.2)
AST SERPL-CCNC: 38 U/L — SIGNIFICANT CHANGE UP (ref 0–41)
BASOPHILS # BLD AUTO: 0 K/UL — SIGNIFICANT CHANGE UP (ref 0–0.2)
BASOPHILS NFR BLD AUTO: 0 % — SIGNIFICANT CHANGE UP (ref 0–1)
BILIRUB DIRECT SERPL-MCNC: 0.4 MG/DL — HIGH (ref 0–0.2)
BILIRUB INDIRECT FLD-MCNC: 0.6 MG/DL — SIGNIFICANT CHANGE UP (ref 0.2–1.2)
BILIRUB SERPL-MCNC: 1 MG/DL — SIGNIFICANT CHANGE UP (ref 0.2–1.2)
BLD GP AB SCN SERPL QL: ABNORMAL
BUN SERPL-MCNC: 13 MG/DL — SIGNIFICANT CHANGE UP (ref 10–20)
CALCIUM SERPL-MCNC: 7.6 MG/DL — LOW (ref 8.5–10.1)
CHLORIDE SERPL-SCNC: 104 MMOL/L — SIGNIFICANT CHANGE UP (ref 98–110)
CO2 SERPL-SCNC: 19 MMOL/L — SIGNIFICANT CHANGE UP (ref 17–32)
CREAT SERPL-MCNC: 0.7 MG/DL — SIGNIFICANT CHANGE UP (ref 0.7–1.5)
DIR ANTIGLOB POLYSPECIFIC INTERPRETATION: SIGNIFICANT CHANGE UP
EOSINOPHIL # BLD AUTO: 0.06 K/UL — SIGNIFICANT CHANGE UP (ref 0–0.7)
EOSINOPHIL NFR BLD AUTO: 3.3 % — SIGNIFICANT CHANGE UP (ref 0–8)
GLUCOSE SERPL-MCNC: 114 MG/DL — HIGH (ref 70–99)
HCT VFR BLD CALC: 25.3 % — LOW (ref 37–47)
HGB BLD-MCNC: 7.7 G/DL — LOW (ref 12–16)
IMM GRANULOCYTES NFR BLD AUTO: 0.5 % — HIGH (ref 0.1–0.3)
INR BLD: 1.55 RATIO — HIGH (ref 0.65–1.3)
LYMPHOCYTES # BLD AUTO: 0.55 K/UL — LOW (ref 1.2–3.4)
LYMPHOCYTES # BLD AUTO: 29.9 % — SIGNIFICANT CHANGE UP (ref 20.5–51.1)
MAGNESIUM SERPL-MCNC: 1.8 MG/DL — SIGNIFICANT CHANGE UP (ref 1.8–2.4)
MCHC RBC-ENTMCNC: 28.5 PG — SIGNIFICANT CHANGE UP (ref 27–31)
MCHC RBC-ENTMCNC: 30.4 G/DL — LOW (ref 32–37)
MCV RBC AUTO: 93.7 FL — SIGNIFICANT CHANGE UP (ref 81–99)
MONOCYTES # BLD AUTO: 0.16 K/UL — SIGNIFICANT CHANGE UP (ref 0.1–0.6)
MONOCYTES NFR BLD AUTO: 8.7 % — SIGNIFICANT CHANGE UP (ref 1.7–9.3)
NEUTROPHILS # BLD AUTO: 1.06 K/UL — LOW (ref 1.4–6.5)
NEUTROPHILS NFR BLD AUTO: 57.6 % — SIGNIFICANT CHANGE UP (ref 42.2–75.2)
NRBC # BLD: 0 /100 WBCS — SIGNIFICANT CHANGE UP (ref 0–0)
PLATELET # BLD AUTO: 38 K/UL — LOW (ref 130–400)
POTASSIUM SERPL-MCNC: 3.8 MMOL/L — SIGNIFICANT CHANGE UP (ref 3.5–5)
POTASSIUM SERPL-SCNC: 3.8 MMOL/L — SIGNIFICANT CHANGE UP (ref 3.5–5)
PROT SERPL-MCNC: 7.6 G/DL — SIGNIFICANT CHANGE UP (ref 6–8)
PROTHROM AB SERPL-ACNC: 17.7 SEC — HIGH (ref 9.95–12.87)
RBC # BLD: 2.7 M/UL — LOW (ref 4.2–5.4)
RBC # FLD: 21.8 % — HIGH (ref 11.5–14.5)
SODIUM SERPL-SCNC: 138 MMOL/L — SIGNIFICANT CHANGE UP (ref 135–146)
TYPE + AB SCN PNL BLD: SIGNIFICANT CHANGE UP
WBC # BLD: 1.84 K/UL — LOW (ref 4.8–10.8)
WBC # FLD AUTO: 1.84 K/UL — LOW (ref 4.8–10.8)

## 2019-01-21 RX ADMIN — URSODIOL 300 MILLIGRAM(S): 250 TABLET, FILM COATED ORAL at 14:30

## 2019-01-21 RX ADMIN — PANTOPRAZOLE SODIUM 40 MILLIGRAM(S): 20 TABLET, DELAYED RELEASE ORAL at 17:28

## 2019-01-21 RX ADMIN — URSODIOL 300 MILLIGRAM(S): 250 TABLET, FILM COATED ORAL at 05:32

## 2019-01-21 RX ADMIN — MIDODRINE HYDROCHLORIDE 10 MILLIGRAM(S): 2.5 TABLET ORAL at 04:59

## 2019-01-21 RX ADMIN — ENOXAPARIN SODIUM 40 MILLIGRAM(S): 100 INJECTION SUBCUTANEOUS at 11:19

## 2019-01-21 RX ADMIN — MIDODRINE HYDROCHLORIDE 10 MILLIGRAM(S): 2.5 TABLET ORAL at 14:30

## 2019-01-21 RX ADMIN — URSODIOL 300 MILLIGRAM(S): 250 TABLET, FILM COATED ORAL at 21:05

## 2019-01-21 RX ADMIN — Medication 50 MICROGRAM(S): at 05:32

## 2019-01-21 RX ADMIN — PANTOPRAZOLE SODIUM 40 MILLIGRAM(S): 20 TABLET, DELAYED RELEASE ORAL at 05:32

## 2019-01-21 RX ADMIN — MIDODRINE HYDROCHLORIDE 10 MILLIGRAM(S): 2.5 TABLET ORAL at 21:05

## 2019-01-21 RX ADMIN — CEFTRIAXONE 100 GRAM(S): 500 INJECTION, POWDER, FOR SOLUTION INTRAMUSCULAR; INTRAVENOUS at 11:21

## 2019-01-21 RX ADMIN — LACTULOSE 20 GRAM(S): 10 SOLUTION ORAL at 05:32

## 2019-01-21 RX ADMIN — SPIRONOLACTONE 12.5 MILLIGRAM(S): 25 TABLET, FILM COATED ORAL at 05:32

## 2019-01-21 RX ADMIN — LACTULOSE 20 GRAM(S): 10 SOLUTION ORAL at 17:28

## 2019-01-21 NOTE — PROGRESS NOTE ADULT - SUBJECTIVE AND OBJECTIVE BOX
SUBJECTIVE:    Patient is a 58y old Female who presents with a chief complaint of abdominal pain for 1.5 days (21 Jan 2019 09:41)    Currently admitted to medicine with the primary diagnosis of Chest pain     Today is hospital day 6d. Overnight no event. Report mild left abd pain. Denied CP, SOB, urinary/BM issues.    PAST MEDICAL & SURGICAL HISTORY  Splenomegaly  Hypotension  GERD (gastroesophageal reflux disease)  Liver cirrhosis  History of appendectomy  History of tonsillectomy        ALLERGIES:  No Known Allergies    MEDICATIONS:  STANDING MEDICATIONS  cefTRIAXone   IVPB 2 Gram(s) IV Intermittent every 24 hours  chlorhexidine 4% Liquid 1 Application(s) Topical <User Schedule>  enoxaparin Injectable 40 milliGRAM(s) SubCutaneous daily  furosemide    Tablet 40 milliGRAM(s) Oral daily  lactulose Syrup 20 Gram(s) Oral two times a day  levothyroxine 50 MICROGram(s) Oral daily  midodrine 10 milliGRAM(s) Oral three times a day  pantoprazole    Tablet 40 milliGRAM(s) Oral two times a day  spironolactone 12.5 milliGRAM(s) Oral daily  ursodiol Capsule 300 milliGRAM(s) Oral every 8 hours    PRN MEDICATIONS  morphine  - Injectable 1 milliGRAM(s) IV Push every 6 hours PRN    VITALS:   T(F): 98.7  HR: 65  BP: 88/50  RR: 18  SpO2: 97%    LABS:                        8.4    1.83  )-----------( 33       ( 20 Jan 2019 04:30 )             27.8     01-20    138  |  103  |  13  ----------------------------<  93  3.9   |  21  |  0.8    Ca    8.0<L>      20 Jan 2019 04:30  Mg     1.7     01-20    TPro  7.8  /  Alb  2.6<L>  /  TBili  1.3<H>  /  DBili  x   /  AST  35  /  ALT  13  /  AlkPhos  86  01-20    PT/INR - ( 20 Jan 2019 04:30 )   PT: 18.00 sec;   INR: 1.57 ratio         PTT - ( 20 Jan 2019 04:30 )  PTT:40.4 sec          Culture - Blood (collected 19 Jan 2019 17:54)  Source: .Blood None  Preliminary Report (21 Jan 2019 04:00):    No growth to date.    Culture - Blood (collected 19 Jan 2019 12:10)  Source: .Blood Blood-Peripheral  Preliminary Report (20 Jan 2019 17:01):    No growth to date.          RADIOLOGY:    PHYSICAL EXAM:  GENERAL: No acute distress, well-developed  HEAD: Atraumatic, Normocephalic  EYES: EOMI, PERRLA, conjunctiva and sclera clear  NECK: Supple, no lymphadenopathy, no JVD  CHEST/LUNG: CTAB; No wheezes, rales, or rhonchi  HEART: Regular rate and rhythm; No murmurs, rubs, or gallops  ABDOMEN: Soft, non-tender, distended; normal bowel sounds, no organomegaly  EXTREMITIES: 2+ peripheral pulses b/l, No clubbing, cyanosis, or edema. No asterixis.   NEUROLOGY: A&O x 3, no focal deficits  SKIN: No rashes or lesions SUBJECTIVE:    Patient is a 58y old Female who presents with a chief complaint of abdominal pain for 1.5 days (21 Jan 2019 09:41)    Currently admitted to medicine with the primary diagnosis of Chest pain     Today is hospital day 6d. Overnight no event. Report mild left abd pain. Denied CP, SOB, urinary/BM issues.    PAST MEDICAL & SURGICAL HISTORY  Splenomegaly  Hypotension  GERD (gastroesophageal reflux disease)  Liver cirrhosis  History of appendectomy  History of tonsillectomy        ALLERGIES:  No Known Allergies    MEDICATIONS:  STANDING MEDICATIONS  cefTRIAXone   IVPB 2 Gram(s) IV Intermittent every 24 hours  chlorhexidine 4% Liquid 1 Application(s) Topical <User Schedule>  enoxaparin Injectable 40 milliGRAM(s) SubCutaneous daily  furosemide    Tablet 40 milliGRAM(s) Oral daily  lactulose Syrup 20 Gram(s) Oral two times a day  levothyroxine 50 MICROGram(s) Oral daily  midodrine 10 milliGRAM(s) Oral three times a day  pantoprazole    Tablet 40 milliGRAM(s) Oral two times a day  spironolactone 12.5 milliGRAM(s) Oral daily  ursodiol Capsule 300 milliGRAM(s) Oral every 8 hours    PRN MEDICATIONS  morphine  - Injectable 1 milliGRAM(s) IV Push every 6 hours PRN    VITALS:   T(F): 98.7  HR: 65  BP: 88/50  RR: 18  SpO2: 97%    LABS:                        8.4    1.83  )-----------( 33       ( 20 Jan 2019 04:30 )             27.8     01-20    138  |  103  |  13  ----------------------------<  93  3.9   |  21  |  0.8    Ca    8.0<L>      20 Jan 2019 04:30  Mg     1.7     01-20    TPro  7.8  /  Alb  2.6<L>  /  TBili  1.3<H>  /  DBili  x   /  AST  35  /  ALT  13  /  AlkPhos  86  01-20    PT/INR - ( 20 Jan 2019 04:30 )   PT: 18.00 sec;   INR: 1.57 ratio         PTT - ( 20 Jan 2019 04:30 )  PTT:40.4 sec            Culture - Blood (collected 19 Jan 2019 17:54)  Source: .Blood None  Preliminary Report (21 Jan 2019 04:00):    No growth to date.    Culture - Blood (collected 19 Jan 2019 12:10)  Source: .Blood Blood-Peripheral  Preliminary Report (20 Jan 2019 17:01):    No growth to date.    Culture - Blood (collected 18 Jan 2019 05:23)  Source: .Blood None  Preliminary Report (19 Jan 2019 13:01):    No growth to date.    Culture - Urine (collected 17 Jan 2019 17:30)  Source: .Urine Clean Catch (Midstream)  Final Report (18 Jan 2019 22:41):    No growth    Culture - Urine (collected 16 Jan 2019 09:50)  Source: .Urine Clean Catch (Midstream)  Final Report (17 Jan 2019 21:56):    No growth    Culture - Blood (collected 15 [place] 2019 16:47)  Source: .Blood None  Final Report (20 Jan 2019 23:00):    No growth at 5 days.    Culture - Body Fluid with Gram Stain (collected 15 [place] 2019 12:56)  Source: .Body Fluid Peritoneal Fluid  Gram Stain (16 Jan 2019 03:00):    polymorphonuclear leukocytes seen    No organisms seen    by cytocentrifuge  Final Report (20 Jan 2019 16:28):    No growth at 5 days    Culture - Blood (collected 15 [place] 2019 07:41)  Source: .Blood Blood-Peripheral  Gram Stain (18 Jan 2019 04:59):    Growth in aerobic bottle: Gram Negative Rods  Final Report (19 Jan 2019 10:45):    Growth in aerobic bottle: Haemophilus influenzae See previous culture    [id number]    Culture - Blood (collected 15 [place] 2019 07:41)  Source: .Blood Blood-Peripheral  Gram Stain (17 Jan 2019 07:15):    Growth in aerobic bottle:    Gram Negative Rods  Final Report (19 Jan 2019 10:16):    Growth in aerobic bottle: Haemophilus influenzae    ***Blood Panel PCR results on this specimen are available    approximately 3 hours after the Gram stain result.***    Gram stain, PCR, and/or culture results may not always    correspond due to differencein methodologies.    ************************************************************    This PCR assay was performed using [email or web address].    The following targets are tested for: Enterococcus,    vancomycin resistant enterococci, Listeria monocytogenes,    coagulase negative staphylococci, S. aureus,    methicillin resistant S. aureus, Streptococcus agalactiae    (Group B), S. pneumoniae, S. pyogenes (Group A),    Acinetobacter baumannii, Enterobacter cloacae, E. coli,    Klebsiella oxytoca, K. pneumoniae, Proteus sp.,    Serratia marcescens, Haemophilus influenzae,    Neisseria meningitidis, Pseudomonas aeruginosa, Candida    albicans, C. glabrata, C krusei, C parapsilosis,    C. tropicalis and the KPC resistance gene.  Organism: Blood Culture PCR  Haemophilus influenzae (19 Jan 2019 10:16)  Organism: Haemophilus influenzae (19 Jan 2019 10:16)  Organism: Blood Culture PCR (19 Jan 2019 10:16)            RADIOLOGY:    PHYSICAL EXAM:  GENERAL: No acute distress, well-developed  HEAD: Atraumatic, Normocephalic  EYES: EOMI, PERRLA, conjunctiva and sclera clear  NECK: Supple, no lymphadenopathy, no JVD  CHEST/LUNG: CTAB; No wheezes, rales, or rhonchi  HEART: Regular rate and rhythm; No murmurs, rubs, or gallops  ABDOMEN: Soft, non-tender, mildly distended; normal bowel sounds, no organomegaly  EXTREMITIES: 2+ peripheral pulses b/l, No clubbing, cyanosis, or edema. No asterixis.   NEUROLOGY: A&O x 3, no focal deficits  SKIN: No rashes or lesions

## 2019-01-21 NOTE — CHART NOTE - NSCHARTNOTEFT_GEN_A_CORE
Gastroenterology Fellow Note:  Discussed case with [facility] [place] regarding plan for transfer.  Presented the case with details to  , Liver fellow at [facility] ,   awaiting for call back from her regarding the approval for transfer to [facility] to expediate the liver transplant.   Will update once I get a call back.

## 2019-01-21 NOTE — PROGRESS NOTE ADULT - ASSESSMENT
IMPRESSION:  Decompensated liver cirrhosis on transplant list  Sepsis secondary to SBP secondary to H influenza  No PNA  No pyelonephritis  No PE  No meningitis  Repeat BCx NTD    RECOMMENDATIONS:  Rocephin 2 gm iv q24h  Given neutropenia will maintain on rocephin

## 2019-01-21 NOTE — PROGRESS NOTE ADULT - ASSESSMENT
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 In the ED, she was found to have a fever of 103F and hypotension. IVFs and IV broad spectrum antibiotics were initiated, therapeutic paracentesis performed for rule out SBP, and patient was in CCU for continued monitoring. She temporarily required Levophed for BP maintenance. Ascitic fluid negative for evidence of SBP, no growth on culture. 2 blood cultures were positive for H. influenzae. Patient was seen and assessed by GI team, with no acute intervention. She was off pressors, afebrile, and downgraded to medical floor. Patient is to be transferred to [facility] under Dr. Dean (gastroenterologist) for continued care and completion of workup for liver transplant.     #Acute Decompensation of Liver Cirrhosis s/p therapeutic paracentesis; Ascites; Portopulmonary HTN; Pancytopenia  -Followed by Dr. Dean at [facility] (information in provider handoff), on transplant list awaiting completion of cardiac workup  -Medically cleared for downgrade to medical floor, [id number] transfer to [facility]  -Continue ursodiol and lactulose  -Lasix 40 and Aldactone 10 if MAP > 60   -Ascitic fluid negative for evidence of SBP  -Therapeutic paracentesis as needed    #Septic Shock  -Blood cx positive for H. influenza x 2  -ID following  -Continue Rocephin  -Follow up repeat blood cultures     #Pancytopenia 2/2 Cirrhosis, chronic  -Evaluated by heme/onc (see full note)   -Active T&S    #Hypothyroidism  -Continue Synthroid    #Hypotension  -Patient runs in the 80s and 90s systolic. Continue home midodrine.     #Recent L1 Compression Fracture s/p trauma  -Per neurosurgery, no intervention     DVT ppx: Lovenox sub-q  GI ppx: Protonix   Diet: DASH/TLC, fluid restriction < 1.5 L/day   Activity: Increase as tolerated   Dispo: downgrade to medical floor, transfer to [facility]   Code status: FULL CODE. 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 In the ED, she was found to have a fever of 103F and hypotension. IVFs and IV broad spectrum antibiotics were initiated, therapeutic paracentesis performed for rule out SBP, and patient was in CCU for continued monitoring. She temporarily required Levophed for BP maintenance. Ascitic fluid negative for evidence of SBP, no growth on culture. 2 blood cultures were positive for H. influenzae. Patient was seen and assessed by GI team, with no acute intervention. She was off pressors, afebrile, and downgraded to medical floor. Patient is to be transferred to [facility] under Dr. Dean (gastroenterologist) for continued care and completion of workup for liver transplant.     #Acute Decompensation of Liver Cirrhosis s/p therapeutic paracentesis; Ascites; Portopulmonary HTN; Pancytopenia  -Followed by Dr. Camille Dean at [facility] [phone], on transplant list awaiting completion of cardiac workup. Dr. Ramirez following on transfer  -Continue ursodiol and lactulose  -Lasix 40 and Aldactone 10 if MAP > 60   -Ascitic fluid negative for evidence of SBP  -Therapeutic paracentesis as needed    #Septic Shock 2/2 H. influenza x 2 Bcx  -ID following- Continue Rocephin q24  -Follow up repeat blood cultures NGTD    #Pancytopenia 2/2 Cirrhosis, splenic sequestration, and possible bone marrow suppression due to sepsis, chronic  -Heme/onc- recs pamella. Monitor CBC. C/w Lovenox as long as platelet counts are over 25,000 unless risk of variceal bleed is felt to be higher than risk of thrombosis-Active T&S    #Hypothyroidism  -Continue Synthroid    #Hypotension  -Patient runs in the 80s and 90s systolic. Continue home midodrine.     #Recent L1 Compression Fracture s/p trauma  -Per neurosurgery, no intervention     DVT ppx: Lovenox sub-q  GI ppx: Protonix   Diet: DASH/TLC, fluid restriction < 1.5 L/day   Activity: Increase as tolerated   Dispo: transfer to [facility]   Code status: FULL CODE.

## 2019-01-21 NOTE — PROGRESS NOTE ADULT - SUBJECTIVE AND OBJECTIVE BOX
TOO EPSTEIN  58y, Female      OVERNIGHT EVENTS:    none    VITALS:  T(F): 98.7, Max: 98.7 (01-21-19 @ 05:08)  HR: 65  BP: 88/50  RR: 18Vital Signs Last 24 Hrs  T(C): 37.1 (21 Jan 2019 05:08), Max: 37.1 (21 Jan 2019 05:08)  T(F): 98.7 (21 Jan 2019 05:08), Max: 98.7 (21 Jan 2019 05:08)  HR: 65 (21 Jan 2019 06:15) (65 - 89)  BP: 88/50 (21 Jan 2019 06:15) (79/48 - 100/54)  BP(mean): --  RR: 18 (21 Jan 2019 05:08) (18 - 89)  SpO2: 97% (20 Jan 2019 20:30) (91% - 98%)    TESTS & MEASUREMENTS:                        8.4    1.83  )-----------( 33       ( 20 Jan 2019 04:30 )             27.8     01-20    138  |  103  |  13  ----------------------------<  93  3.9   |  21  |  0.8    Ca    8.0<L>      20 Jan 2019 04:30  Mg     1.7     01-20    TPro  7.8  /  Alb  2.6<L>  /  TBili  1.3<H>  /  DBili  x   /  AST  35  /  ALT  13  /  AlkPhos  86  01-20    LIVER FUNCTIONS - ( 20 Jan 2019 04:30 )  Alb: 2.6 g/dL / Pro: 7.8 g/dL / ALK PHOS: 86 U/L / ALT: 13 U/L / AST: 35 U/L / GGT: x             Culture - Blood (collected 01-19-19 @ 17:54)  Source: .Blood None  Preliminary Report (01-21-19 @ 04:00):    No growth to date.    Culture - Blood (collected 01-19-19 @ 12:10)  Source: .Blood Blood-Peripheral  Preliminary Report (01-20-19 @ 17:01):    No growth to date.    Culture - Blood (collected 01-18-19 @ 05:23)  Source: .Blood None  Preliminary Report (01-19-19 @ 13:01):    No growth to date.    Culture - Urine (collected 01-17-19 @ 17:30)  Source: .Urine Clean Catch (Midstream)  Final Report (01-18-19 @ 22:41):    No growth    Culture - Urine (collected 01-16-19 @ 09:50)  Source: .Urine Clean Catch (Midstream)  Final Report (01-17-19 @ 21:56):    No growth    Culture - Blood (collected 01-15-19 @ 16:47)  Source: .Blood None  Final Report (01-20-19 @ 23:00):    No growth at 5 days.    Culture - Body Fluid with Gram Stain (collected 01-15-19 @ 12:56)  Source: .Body Fluid Peritoneal Fluid  Gram Stain (01-16-19 @ 03:00):    polymorphonuclear leukocytes seen    No organisms seen    by cytocentrifuge  Final Report (01-20-19 @ 16:28):    No growth at 5 days    Culture - Blood (collected 01-15-19 @ 07:41)  Source: .Blood Blood-Peripheral  Gram Stain (01-18-19 @ 04:59):    Growth in aerobic bottle: Gram Negative Rods  Final Report (01-19-19 @ 10:45):    Growth in aerobic bottle: Haemophilus influenzae See previous culture    [id number]    Culture - Blood (collected 01-15-19 @ 07:41)  Source: .Blood Blood-Peripheral  Gram Stain (01-17-19 @ 07:15):    Growth in aerobic bottle:    Gram Negative Rods  Final Report (01-19-19 @ 10:16):    Growth in aerobic bottle: Haemophilus influenzae    ***Blood Panel PCR results on this specimen are available    approximately 3 hours after the Gram stain result.***    Gram stain, PCR, and/or culture results may not always    correspond due to differencein methodologies.    ************************************************************    This PCR assay was performed using [employer].    The following targets are tested for: Enterococcus,    vancomycin resistant enterococci, Listeria monocytogenes,    coagulase negative staphylococci, S. aureus,    methicillin resistant S. aureus, Streptococcus agalactiae    (Group B), S. pneumoniae, S. pyogenes (Group A),    Acinetobacter baumannii, Enterobacter cloacae, E. coli,    Klebsiella oxytoca, K. pneumoniae, Proteus sp.,    Serratia marcescens, Haemophilus influenzae,    Neisseria meningitidis, Pseudomonas aeruginosa, Candida    albicans, C. glabrata, C krusei, C parapsilosis,    C. tropicalis and the KPC resistance gene.  Organism: Blood Culture PCR  Haemophilus influenzae (01-19-19 @ 10:16)  Organism: Haemophilus influenzae (01-19-19 @ 10:16)      -  Amoxicillin/Clavulanic Acid: R      -  Ampicillin: R      -  Ampicillin/Sulbactam: R      -  Ceftriaxone: S      -  Ciprofloxacin: S      -  Meropenem: S      Method Type: KB  Organism: Blood Culture PCR (01-19-19 @ 10:16)      -  Haemophilus influenzae: Detec      Method Type: PCR            RADIOLOGY & ADDITIONAL TESTS:    ANTIBIOTICS:  cefTRIAXone   IVPB 2 Gram(s) IV Intermittent every 24 hours

## 2019-01-21 NOTE — PROGRESS NOTE ADULT - ASSESSMENT
#Acute Decompensation of Liver Cirrhosis s/p therapeutic paracentesis; Ascites; Portopulmonary HTN; Pancytopenia  -dw gi at length , will follow up on transfer     #Septic Shock secondary to h influenza with decompensated liver cirrhosis   appreciate id follow up , airam herrera    #. Moderate tricuspid regurgitation.    #Splenomegaly with para splenic varices    #. Mild pulmonic valve regurgitation.    #Chronic diastolic congestive heart failure on furosemide stable    #Right bundle branch block - no acute intervention    #mild hypomagnesemia - magnesium oxide x 1    #Pancytopenia 2/2 Cirrhosis, chronic  stable     #Hypothyroidism  -Continue Synthroid    #Hypotension  -stable on midodrine    #Recent L1 Compression Fracture s/p trauma  -Per neurosurgery, no intervention     #dw hs, gi and son who is aware of plan and agrees to plan of care

## 2019-01-22 LAB
ALBUMIN SERPL ELPH-MCNC: 2.4 G/DL — LOW (ref 3.5–5.2)
ALP SERPL-CCNC: 85 U/L — SIGNIFICANT CHANGE UP (ref 30–115)
ALT FLD-CCNC: 13 U/L — SIGNIFICANT CHANGE UP (ref 0–41)
ANION GAP SERPL CALC-SCNC: 14 MMOL/L — SIGNIFICANT CHANGE UP (ref 7–14)
APTT BLD: 38.3 SEC — SIGNIFICANT CHANGE UP (ref 27–39.2)
AST SERPL-CCNC: 37 U/L — SIGNIFICANT CHANGE UP (ref 0–41)
BASOPHILS # BLD AUTO: 0.02 K/UL — SIGNIFICANT CHANGE UP (ref 0–0.2)
BASOPHILS NFR BLD AUTO: 1.1 % — HIGH (ref 0–1)
BILIRUB DIRECT SERPL-MCNC: 0.4 MG/DL — HIGH (ref 0–0.2)
BILIRUB INDIRECT FLD-MCNC: 0.6 MG/DL — SIGNIFICANT CHANGE UP (ref 0.2–1.2)
BILIRUB SERPL-MCNC: 1 MG/DL — SIGNIFICANT CHANGE UP (ref 0.2–1.2)
BUN SERPL-MCNC: 11 MG/DL — SIGNIFICANT CHANGE UP (ref 10–20)
CALCIUM SERPL-MCNC: 7.8 MG/DL — LOW (ref 8.5–10.1)
CHLORIDE SERPL-SCNC: 102 MMOL/L — SIGNIFICANT CHANGE UP (ref 98–110)
CO2 SERPL-SCNC: 19 MMOL/L — SIGNIFICANT CHANGE UP (ref 17–32)
CREAT SERPL-MCNC: 0.6 MG/DL — LOW (ref 0.7–1.5)
EOSINOPHIL # BLD AUTO: 0.04 K/UL — SIGNIFICANT CHANGE UP (ref 0–0.7)
EOSINOPHIL NFR BLD AUTO: 2.1 % — SIGNIFICANT CHANGE UP (ref 0–8)
GLUCOSE SERPL-MCNC: 88 MG/DL — SIGNIFICANT CHANGE UP (ref 70–99)
HCT VFR BLD CALC: 25.3 % — LOW (ref 37–47)
HGB BLD-MCNC: 7.7 G/DL — LOW (ref 12–16)
IMM GRANULOCYTES NFR BLD AUTO: 1.1 % — HIGH (ref 0.1–0.3)
INR BLD: 1.55 RATIO — HIGH (ref 0.65–1.3)
LYMPHOCYTES # BLD AUTO: 0.67 K/UL — LOW (ref 1.2–3.4)
LYMPHOCYTES # BLD AUTO: 35.6 % — SIGNIFICANT CHANGE UP (ref 20.5–51.1)
MAGNESIUM SERPL-MCNC: 1.9 MG/DL — SIGNIFICANT CHANGE UP (ref 1.8–2.4)
MCHC RBC-ENTMCNC: 28.1 PG — SIGNIFICANT CHANGE UP (ref 27–31)
MCHC RBC-ENTMCNC: 30.4 G/DL — LOW (ref 32–37)
MCV RBC AUTO: 92.3 FL — SIGNIFICANT CHANGE UP (ref 81–99)
MONOCYTES # BLD AUTO: 0.19 K/UL — SIGNIFICANT CHANGE UP (ref 0.1–0.6)
MONOCYTES NFR BLD AUTO: 10.1 % — HIGH (ref 1.7–9.3)
NEUTROPHILS # BLD AUTO: 0.94 K/UL — LOW (ref 1.4–6.5)
NEUTROPHILS NFR BLD AUTO: 50 % — SIGNIFICANT CHANGE UP (ref 42.2–75.2)
NRBC # BLD: 0 /100 WBCS — SIGNIFICANT CHANGE UP (ref 0–0)
PLATELET # BLD AUTO: 36 K/UL — LOW (ref 130–400)
POTASSIUM SERPL-MCNC: 4 MMOL/L — SIGNIFICANT CHANGE UP (ref 3.5–5)
POTASSIUM SERPL-SCNC: 4 MMOL/L — SIGNIFICANT CHANGE UP (ref 3.5–5)
PROT SERPL-MCNC: 7.4 G/DL — SIGNIFICANT CHANGE UP (ref 6–8)
PROTHROM AB SERPL-ACNC: 17.7 SEC — HIGH (ref 9.95–12.87)
RBC # BLD: 2.74 M/UL — LOW (ref 4.2–5.4)
RBC # FLD: 21.8 % — HIGH (ref 11.5–14.5)
SODIUM SERPL-SCNC: 135 MMOL/L — SIGNIFICANT CHANGE UP (ref 135–146)
WBC # BLD: 1.88 K/UL — LOW (ref 4.8–10.8)
WBC # FLD AUTO: 1.88 K/UL — LOW (ref 4.8–10.8)

## 2019-01-22 RX ADMIN — MIDODRINE HYDROCHLORIDE 10 MILLIGRAM(S): 2.5 TABLET ORAL at 13:40

## 2019-01-22 RX ADMIN — Medication 50 MICROGRAM(S): at 05:16

## 2019-01-22 RX ADMIN — PANTOPRAZOLE SODIUM 40 MILLIGRAM(S): 20 TABLET, DELAYED RELEASE ORAL at 05:16

## 2019-01-22 RX ADMIN — URSODIOL 300 MILLIGRAM(S): 250 TABLET, FILM COATED ORAL at 22:09

## 2019-01-22 RX ADMIN — URSODIOL 300 MILLIGRAM(S): 250 TABLET, FILM COATED ORAL at 05:16

## 2019-01-22 RX ADMIN — MIDODRINE HYDROCHLORIDE 10 MILLIGRAM(S): 2.5 TABLET ORAL at 05:15

## 2019-01-22 RX ADMIN — URSODIOL 300 MILLIGRAM(S): 250 TABLET, FILM COATED ORAL at 13:40

## 2019-01-22 RX ADMIN — CEFTRIAXONE 100 GRAM(S): 500 INJECTION, POWDER, FOR SOLUTION INTRAMUSCULAR; INTRAVENOUS at 11:33

## 2019-01-22 RX ADMIN — LACTULOSE 20 GRAM(S): 10 SOLUTION ORAL at 17:35

## 2019-01-22 RX ADMIN — MIDODRINE HYDROCHLORIDE 10 MILLIGRAM(S): 2.5 TABLET ORAL at 22:09

## 2019-01-22 RX ADMIN — ENOXAPARIN SODIUM 40 MILLIGRAM(S): 100 INJECTION SUBCUTANEOUS at 11:34

## 2019-01-22 RX ADMIN — LACTULOSE 20 GRAM(S): 10 SOLUTION ORAL at 05:16

## 2019-01-22 RX ADMIN — PANTOPRAZOLE SODIUM 40 MILLIGRAM(S): 20 TABLET, DELAYED RELEASE ORAL at 17:35

## 2019-01-22 NOTE — PROGRESS NOTE ADULT - ATTENDING COMMENTS
patient seen and examined , agree with pgy 1 assesment and plan except as indicated above,   GEN Lying in no acute distress  HEENT Pupils equal and reactive to light and accommodationSupple Neck  PULM Clear to auscultation bilaterally  CV s1s2 regular rate and rhythm  GI + bowel sounds distended  EXT no cyanosis or edema  PSYCH awake alert and oriented x 3  INTEG No Lesions  NEURO MOON    #Acute Decompensation of Liver Cirrhosis s/p therapeutic paracentesis; Ascites; Portopulmonary HTN; Pancytopenia  -dw gi - no transfer at this time , finish course of [facility] inpatient and dc home to follow up [facility] dr dixon     #Septic Shock secondary to h influenza with decompensated liver cirrhosis   appreciate id follow up , cw rocephin    #. Moderate tricuspid regurgitation.    #Splenomegaly with para splenic varices    #. Mild pulmonic valve regurgitation.    #Chronic diastolic congestive heart failure on furosemide stable    #Right bundle branch block - no acute intervention    #mild hypomagnesemia - resolved  #Pancytopenia 2/2 Cirrhosis, chronic  stable     #Hypothyroidism  -Continue Synthroid    #Recent L1 Compression Fracture s/p trauma  -Per neurosurgery, no intervention

## 2019-01-22 NOTE — PROGRESS NOTE ADULT - SUBJECTIVE AND OBJECTIVE BOX
SUBJECTIVE:    Patient is a 58y old Female who presents with a chief complaint of abdominal pain for 1.5 days (22 Jan 2019 07:37)    Currently admitted to medicine with the primary diagnosis of Chest pain     Today is hospital day 7d. Overnight no event. Denied abd pain, CP, SOB, urinary/BM issues.    PAST MEDICAL & SURGICAL HISTORY  Splenomegaly  Hypotension  GERD (gastroesophageal reflux disease)  Liver cirrhosis  History of appendectomy  History of tonsillectomy        ALLERGIES:  No Known Allergies    MEDICATIONS:  STANDING MEDICATIONS  cefTRIAXone   IVPB 2 Gram(s) IV Intermittent every 24 hours  chlorhexidine 4% Liquid 1 Application(s) Topical <User Schedule>  enoxaparin Injectable 40 milliGRAM(s) SubCutaneous daily  furosemide    Tablet 40 milliGRAM(s) Oral daily  lactulose Syrup 20 Gram(s) Oral two times a day  levothyroxine 50 MICROGram(s) Oral daily  midodrine 10 milliGRAM(s) Oral three times a day  pantoprazole    Tablet 40 milliGRAM(s) Oral two times a day  spironolactone 12.5 milliGRAM(s) Oral daily  ursodiol Capsule 300 milliGRAM(s) Oral every 8 hours    PRN MEDICATIONS  morphine  - Injectable 1 milliGRAM(s) IV Push every 6 hours PRN    VITALS:   T(F): 97.5  HR: 65  BP: 83/50  RR: 18  SpO2: --    LABS:                        7.7    1.88  )-----------( 36       ( 22 Jan 2019 07:21 )             25.3     01-22    135  |  102  |  11  ----------------------------<  88  4.0   |  19  |  0.6<L>    Ca    7.8<L>      22 Jan 2019 07:21  Mg     1.9     01-22    TPro  7.4  /  Alb  2.4<L>  /  TBili  1.0  /  DBili  0.4<H>  /  AST  37  /  ALT  13  /  AlkPhos  85  01-22    PT/INR - ( 22 Jan 2019 07:21 )   PT: 17.70 sec;   INR: 1.55 ratio         PTT - ( 22 Jan 2019 07:21 )  PTT:38.3 sec          Culture - Blood (collected 19 Jan 2019 17:54)  Source: .Blood None  Preliminary Report (21 Jan 2019 04:00):    No growth to date.    Culture - Blood (collected 19 Jan 2019 12:10)  Source: .Blood Blood-Peripheral  Preliminary Report (20 Jan 2019 17:01):    No growth to date.          RADIOLOGY:    Culture - Blood (collected 19 Jan 2019 17:54)  Source: .Blood None  Preliminary Report (21 Jan 2019 04:00):    No growth to date.    Culture - Blood (collected 19 Jan 2019 12:10)  Source: .Blood Blood-Peripheral  Preliminary Report (20 Jan 2019 17:01):    No growth to date.    Culture - Blood (collected 18 Jan 2019 05:23)  Source: .Blood None  Preliminary Report (19 Jan 2019 13:01):    No growth to date.    Culture - Urine (collected 17 Jan 2019 17:30)  Source: .Urine Clean Catch (Midstream)  Final Report (18 Jan 2019 22:41):    No growth    Culture - Urine (collected 16 Jan 2019 09:50)  Source: .Urine Clean Catch (Midstream)  Final Report (17 Jan 2019 21:56):    No growth    Culture - Blood (collected 15 [place] 2019 16:47)  Source: .Blood None  Final Report (20 Jan 2019 23:00):    No growth at 5 days.    Culture - Body Fluid with Gram Stain (collected 15 [place] 2019 12:56)  Source: .Body Fluid Peritoneal Fluid  Gram Stain (16 Jan 2019 03:00):    polymorphonuclear leukocytes seen    No organisms seen    by cytocentrifuge  Final Report (20 Jan 2019 16:28):    No growth at 5 days    Culture - Blood (collected 15 [place] 2019 07:41)  Source: .Blood Blood-Peripheral  Gram Stain (18 Jan 2019 04:59):    Growth in aerobic bottle: Gram Negative Rods  Final Report (19 Jan 2019 10:45):    Growth in aerobic bottle: Haemophilus influenzae See previous culture    [id number]    Culture - Blood (collected 15 [place] 2019 07:41)  Source: .Blood Blood-Peripheral  Gram Stain (17 Jan 2019 07:15):    Growth in aerobic bottle:    Gram Negative Rods  Final Report (19 Jan 2019 10:16):    Growth in aerobic bottle: Haemophilus influenzae    ***Blood Panel PCR results on this specimen are available    approximately 3 hours after the Gram stain result.***    Gram stain, PCR, and/or culture results may not always    correspond due to differencein methodologies.    ************************************************************    This PCR assay was performed using [employer].    The following targets are tested for: Enterococcus,    vancomycin resistant enterococci, Listeria monocytogenes,    coagulase negative staphylococci, S. aureus,    methicillin resistant S. aureus, Streptococcus agalactiae    (Group B), S. pneumoniae, S. pyogenes (Group A),    Acinetobacter baumannii, Enterobacter cloacae, E. coli,    Klebsiella oxytoca, K. pneumoniae, Proteus sp.,    Serratia marcescens, Haemophilus influenzae,    Neisseria meningitidis, Pseudomonas aeruginosa, Candida    albicans, C. glabrata, C krusei, C parapsilosis,    C. tropicalis and the KPC resistance gene.  Organism: Blood Culture PCR  Haemophilus influenzae (19 Jan 2019 10:16)  Organism: Haemophilus influenzae (19 Jan 2019 10:16)  Organism: Blood Culture PCR (19 Jan 2019 10:16)      PHYSICAL EXAM:  GENERAL: No acute distress, well-developed  HEAD: Atraumatic, Normocephalic  EYES: EOMI, PERRLA, conjunctiva and sclera clear  NECK: Supple, no lymphadenopathy, no JVD  CHEST/LUNG: CTAB; No wheezes, rales, or rhonchi  HEART: Regular rate and rhythm; No murmurs, rubs, or gallops  ABDOMEN: Soft, non-tender, mildly distended; normal bowel sounds, no organomegaly  EXTREMITIES: 2+ peripheral pulses b/l, No clubbing, cyanosis, or edema. No asterixis.   NEUROLOGY: A&O x 3, no focal deficits  SKIN: No rashes or lesions

## 2019-01-22 NOTE — PROGRESS NOTE ADULT - ASSESSMENT
58F PMH liver cirrhosis 2/2 primary biliary cirrhosis (complicated by ascites, portopulmonary HTN and splenomegaly; on transplant list at [facility]), hypotension, and GERD p/w non-radiating epigastric pain, N/V. In the ED, found to have fever 103F and hypotension. IVFs and IV broad spectrum antibiotics were initiated, therapeutic paracentesis performed for rule out SBP, and patient was in CCU for continued monitoring. She temporarily required Levophed for BP maintenance. Ascitic fluid negative for evidence of SBP, no growth on culture. 2 blood cultures were positive for H. influenzae. Patient was seen and assessed by GI team, with no acute intervention. She is off pressors, afebrile, and downgraded to medical floor. Patient is to be transferred to [facility] under Dr. Dean (gastroenterologist) for continued care and completion of workup for liver transplant.     #Acute Decompensation of Liver Cirrhosis s/p therapeutic paracentesis; Ascites; Portopulmonary HTN; Pancytopenia  -Followed by Dr. Camille Dean at [facility] [phone], on transplant list awaiting completion of cardiac workup. GI following on transfer  -Continue ursodiol and lactulose  -Lasix 40 and Aldactone 10 if MAP > 60   -Ascitic fluid negative for evidence of SBP  -Therapeutic paracentesis as needed    #Septic Shock 2/2 H. influenza x 2 Bcx  -ID- Continue Rocephin 2g q24 for 3 more days (End 1/25, called Dr. Blanc) Updated GI  -Follow up repeat blood cultures NGTD    #Pancytopenia 2/2 Cirrhosis, splenic sequestration, and possible bone marrow suppression due to sepsis- chronic  -Heme/onc- recs pamella. Monitor CBC. C/w Lovenox as long as platelet counts are over 25,000 unless risk of variceal bleed is felt to be higher than risk of thrombosis  -Active T&S    #Hypothyroidism  -Continue Synthroid    #Hypotension  -Patient runs in the 80s and 90s systolic. Continue home midodrine.     #Recent L1 Compression Fracture s/p trauma  -Per neurosurgery, no intervention     DVT ppx: Lovenox sub-q  GI ppx: Protonix   Diet: DASH/TLC, fluid restriction < 1.5 L/day   Activity: Increase as tolerated   Dispo: transfer to [facility]   Code status: FULL CODE.

## 2019-01-22 NOTE — CHART NOTE - NSCHARTNOTEFT_GEN_A_CORE
Gastroenterology Fellow Note:  Discussed case today with [facility] regarding plan for transfer.  Presented the case with details to , Liver fellow at [facility] , and after discussing with  at [facility]   It is deemed that she will not need a transfer to [facility] at this time given her Low MELD score of 12 and is stable.  Rec to complete the course of Abx and follow up with  at [facility] as an outpatient soon after discharge in 2 weeks.  Informed Medical resident

## 2019-01-22 NOTE — PROGRESS NOTE ADULT - ASSESSMENT
IMPRESSION:  Decompensated liver cirrhosis on transplant list  Sepsis secondary to SBP secondary to H influenza  Repeat BCx NTD    RECOMMENDATIONS:  Rocephin 2 gm iv q24h  Given neutropenia will maintain on rocephin till pt transferred for possible Tx  recall prn please

## 2019-01-22 NOTE — PROGRESS NOTE ADULT - SUBJECTIVE AND OBJECTIVE BOX
TOO EPSTEIN  58y, Female      OVERNIGHT EVENTS:    no fevers, none    VITALS:  T(F): 97.5, Max: 97.5 (01-22-19 @ 05:21)  HR: 65  BP: 83/50  RR: 18Vital Signs Last 24 Hrs  T(C): 36.4 (22 Jan 2019 05:21), Max: 36.4 (22 Jan 2019 05:21)  T(F): 97.5 (22 Jan 2019 05:21), Max: 97.5 (22 Jan 2019 05:21)  HR: 65 (22 Jan 2019 05:21) (65 - 73)  BP: 83/50 (22 Jan 2019 05:21) (78/45 - 102/53)  BP(mean): --  RR: 18 (22 Jan 2019 05:21) (18 - 18)  SpO2: --    TESTS & MEASUREMENTS:                        7.7    1.84  )-----------( 38       ( 21 Jan 2019 11:36 )             25.3     01-21    138  |  104  |  13  ----------------------------<  114<H>  3.8   |  19  |  0.7    Ca    7.6<L>      21 Jan 2019 11:36  Mg     1.8     01-21    TPro  7.6  /  Alb  2.6<L>  /  TBili  1.0  /  DBili  0.4<H>  /  AST  38  /  ALT  13  /  AlkPhos  89  01-21    LIVER FUNCTIONS - ( 21 Jan 2019 11:36 )  Alb: 2.6 g/dL / Pro: 7.6 g/dL / ALK PHOS: 89 U/L / ALT: 13 U/L / AST: 38 U/L / GGT: x             Culture - Blood (collected 01-19-19 @ 17:54)  Source: .Blood None  Preliminary Report (01-21-19 @ 04:00):    No growth to date.    Culture - Blood (collected 01-19-19 @ 12:10)  Source: .Blood Blood-Peripheral  Preliminary Report (01-20-19 @ 17:01):    No growth to date.    Culture - Blood (collected 01-18-19 @ 05:23)  Source: .Blood None  Preliminary Report (01-19-19 @ 13:01):    No growth to date.    Culture - Urine (collected 01-17-19 @ 17:30)  Source: .Urine Clean Catch (Midstream)  Final Report (01-18-19 @ 22:41):    No growth    Culture - Urine (collected 01-16-19 @ 09:50)  Source: .Urine Clean Catch (Midstream)  Final Report (01-17-19 @ 21:56):    No growth    Culture - Blood (collected 01-15-19 @ 16:47)  Source: .Blood None  Final Report (01-20-19 @ 23:00):    No growth at 5 days.    Culture - Body Fluid with Gram Stain (collected 01-15-19 @ 12:56)  Source: .Body Fluid Peritoneal Fluid  Gram Stain (01-16-19 @ 03:00):    polymorphonuclear leukocytes seen    No organisms seen    by cytocentrifuge  Final Report (01-20-19 @ 16:28):    No growth at 5 days    Culture - Blood (collected 01-15-19 @ 07:41)  Source: .Blood Blood-Peripheral  Gram Stain (01-18-19 @ 04:59):    Growth in aerobic bottle: Gram Negative Rods  Final Report (01-19-19 @ 10:45):    Growth in aerobic bottle: Haemophilus influenzae See previous culture    [id number]    Culture - Blood (collected 01-15-19 @ 07:41)  Source: .Blood Blood-Peripheral  Gram Stain (01-17-19 @ 07:15):    Growth in aerobic bottle:    Gram Negative Rods  Final Report (01-19-19 @ 10:16):    Growth in aerobic bottle: Haemophilus influenzae    ***Blood Panel PCR results on this specimen are available    approximately 3 hours after the Gram stain result.***    Gram stain, PCR, and/or culture results may not always    correspond due to differencein methodologies.    ************************************************************    This PCR assay was performed using [employer].    The following targets are tested for: Enterococcus,    vancomycin resistant enterococci, Listeria monocytogenes,    coagulase negative staphylococci, S. aureus,    methicillin resistant S. aureus, Streptococcus agalactiae    (Group B), S. pneumoniae, S. pyogenes (Group A),    Acinetobacter baumannii, Enterobacter cloacae, E. coli,    Klebsiella oxytoca, K. pneumoniae, Proteus sp.,    Serratia marcescens, Haemophilus influenzae,    Neisseria meningitidis, Pseudomonas aeruginosa, Candida    albicans, C. glabrata, C krusei, C parapsilosis,    C. tropicalis and the KPC resistance gene.  Organism: Blood Culture PCR  Haemophilus influenzae (01-19-19 @ 10:16)  Organism: Haemophilus influenzae (01-19-19 @ 10:16)      -  Amoxicillin/Clavulanic Acid: R      -  Ampicillin: R      -  Ampicillin/Sulbactam: R      -  Ceftriaxone: S      -  Ciprofloxacin: S      -  Meropenem: S      Method Type: KB  Organism: Blood Culture PCR (01-19-19 @ 10:16)      -  Haemophilus influenzae: Detec      Method Type: PCR            RADIOLOGY & ADDITIONAL TESTS:    ANTIBIOTICS:  cefTRIAXone   IVPB 2 Gram(s) IV Intermittent every 24 hours

## 2019-01-23 LAB
ALBUMIN SERPL ELPH-MCNC: 2.4 G/DL — LOW (ref 3.5–5.2)
ALP SERPL-CCNC: 91 U/L — SIGNIFICANT CHANGE UP (ref 30–115)
ALT FLD-CCNC: 14 U/L — SIGNIFICANT CHANGE UP (ref 0–41)
ANION GAP SERPL CALC-SCNC: 14 MMOL/L — SIGNIFICANT CHANGE UP (ref 7–14)
APTT BLD: 38.7 SEC — SIGNIFICANT CHANGE UP (ref 27–39.2)
AST SERPL-CCNC: 41 U/L — SIGNIFICANT CHANGE UP (ref 0–41)
BASOPHILS # BLD AUTO: 0 K/UL — SIGNIFICANT CHANGE UP (ref 0–0.2)
BASOPHILS NFR BLD AUTO: 0 % — SIGNIFICANT CHANGE UP (ref 0–1)
BILIRUB DIRECT SERPL-MCNC: 0.4 MG/DL — HIGH (ref 0–0.2)
BILIRUB INDIRECT FLD-MCNC: 0.5 MG/DL — SIGNIFICANT CHANGE UP (ref 0.2–1.2)
BILIRUB SERPL-MCNC: 0.9 MG/DL — SIGNIFICANT CHANGE UP (ref 0.2–1.2)
BLD GP AB SCN SERPL QL: ABNORMAL
BUN SERPL-MCNC: 11 MG/DL — SIGNIFICANT CHANGE UP (ref 10–20)
CALCIUM SERPL-MCNC: 7.6 MG/DL — LOW (ref 8.5–10.1)
CHLORIDE SERPL-SCNC: 106 MMOL/L — SIGNIFICANT CHANGE UP (ref 98–110)
CO2 SERPL-SCNC: 18 MMOL/L — SIGNIFICANT CHANGE UP (ref 17–32)
CREAT SERPL-MCNC: 0.6 MG/DL — LOW (ref 0.7–1.5)
CULTURE RESULTS: SIGNIFICANT CHANGE UP
EOSINOPHIL # BLD AUTO: 0.04 K/UL — SIGNIFICANT CHANGE UP (ref 0–0.7)
EOSINOPHIL NFR BLD AUTO: 2.3 % — SIGNIFICANT CHANGE UP (ref 0–8)
GLUCOSE SERPL-MCNC: 109 MG/DL — HIGH (ref 70–99)
HCT VFR BLD CALC: 25.1 % — LOW (ref 37–47)
HGB BLD-MCNC: 7.6 G/DL — LOW (ref 12–16)
IMM GRANULOCYTES NFR BLD AUTO: 0 % — LOW (ref 0.1–0.3)
INR BLD: 1.58 RATIO — HIGH (ref 0.65–1.3)
LYMPHOCYTES # BLD AUTO: 0.55 K/UL — LOW (ref 1.2–3.4)
LYMPHOCYTES # BLD AUTO: 32 % — SIGNIFICANT CHANGE UP (ref 20.5–51.1)
MAGNESIUM SERPL-MCNC: 1.9 MG/DL — SIGNIFICANT CHANGE UP (ref 1.8–2.4)
MCHC RBC-ENTMCNC: 28.1 PG — SIGNIFICANT CHANGE UP (ref 27–31)
MCHC RBC-ENTMCNC: 30.3 G/DL — LOW (ref 32–37)
MCV RBC AUTO: 93 FL — SIGNIFICANT CHANGE UP (ref 81–99)
MONOCYTES # BLD AUTO: 0.15 K/UL — SIGNIFICANT CHANGE UP (ref 0.1–0.6)
MONOCYTES NFR BLD AUTO: 8.7 % — SIGNIFICANT CHANGE UP (ref 1.7–9.3)
NEUTROPHILS # BLD AUTO: 0.98 K/UL — LOW (ref 1.4–6.5)
NEUTROPHILS NFR BLD AUTO: 57 % — SIGNIFICANT CHANGE UP (ref 42.2–75.2)
NRBC # BLD: 0 /100 WBCS — SIGNIFICANT CHANGE UP (ref 0–0)
PLATELET # BLD AUTO: 33 K/UL — LOW (ref 130–400)
POTASSIUM SERPL-MCNC: 3.5 MMOL/L — SIGNIFICANT CHANGE UP (ref 3.5–5)
POTASSIUM SERPL-SCNC: 3.5 MMOL/L — SIGNIFICANT CHANGE UP (ref 3.5–5)
PROT SERPL-MCNC: 7.5 G/DL — SIGNIFICANT CHANGE UP (ref 6–8)
PROTHROM AB SERPL-ACNC: 18.1 SEC — HIGH (ref 9.95–12.87)
RBC # BLD: 2.7 M/UL — LOW (ref 4.2–5.4)
RBC # FLD: 21.7 % — HIGH (ref 11.5–14.5)
SODIUM SERPL-SCNC: 138 MMOL/L — SIGNIFICANT CHANGE UP (ref 135–146)
SPECIMEN SOURCE: SIGNIFICANT CHANGE UP
TYPE + AB SCN PNL BLD: SIGNIFICANT CHANGE UP
WBC # BLD: 1.72 K/UL — LOW (ref 4.8–10.8)
WBC # FLD AUTO: 1.72 K/UL — LOW (ref 4.8–10.8)

## 2019-01-23 RX ADMIN — MIDODRINE HYDROCHLORIDE 10 MILLIGRAM(S): 2.5 TABLET ORAL at 21:29

## 2019-01-23 RX ADMIN — SPIRONOLACTONE 12.5 MILLIGRAM(S): 25 TABLET, FILM COATED ORAL at 06:05

## 2019-01-23 RX ADMIN — PANTOPRAZOLE SODIUM 40 MILLIGRAM(S): 20 TABLET, DELAYED RELEASE ORAL at 17:49

## 2019-01-23 RX ADMIN — LACTULOSE 20 GRAM(S): 10 SOLUTION ORAL at 17:49

## 2019-01-23 RX ADMIN — PANTOPRAZOLE SODIUM 40 MILLIGRAM(S): 20 TABLET, DELAYED RELEASE ORAL at 06:05

## 2019-01-23 RX ADMIN — Medication 50 MICROGRAM(S): at 06:04

## 2019-01-23 RX ADMIN — ENOXAPARIN SODIUM 40 MILLIGRAM(S): 100 INJECTION SUBCUTANEOUS at 11:28

## 2019-01-23 RX ADMIN — URSODIOL 300 MILLIGRAM(S): 250 TABLET, FILM COATED ORAL at 14:07

## 2019-01-23 RX ADMIN — MIDODRINE HYDROCHLORIDE 10 MILLIGRAM(S): 2.5 TABLET ORAL at 14:07

## 2019-01-23 RX ADMIN — CEFTRIAXONE 100 GRAM(S): 500 INJECTION, POWDER, FOR SOLUTION INTRAMUSCULAR; INTRAVENOUS at 11:28

## 2019-01-23 RX ADMIN — LACTULOSE 20 GRAM(S): 10 SOLUTION ORAL at 06:04

## 2019-01-23 RX ADMIN — MIDODRINE HYDROCHLORIDE 10 MILLIGRAM(S): 2.5 TABLET ORAL at 06:04

## 2019-01-23 RX ADMIN — URSODIOL 300 MILLIGRAM(S): 250 TABLET, FILM COATED ORAL at 21:29

## 2019-01-23 RX ADMIN — Medication 40 MILLIGRAM(S): at 06:04

## 2019-01-23 RX ADMIN — URSODIOL 300 MILLIGRAM(S): 250 TABLET, FILM COATED ORAL at 06:04

## 2019-01-23 NOTE — PROGRESS NOTE ADULT - ASSESSMENT
58F PMH liver cirrhosis 2/2 primary biliary cirrhosis (complicated by ascites, portopulmonary HTN and splenomegaly; on transplant list at [facility]), hypotension, and GERD p/w non-radiating epigastric pain, N/V. In ED, found to have fever 103F and hypotension. IVFs and IV broad spectrum antibiotics were initiated, therapeutic paracentesis performed for rule out SBP, and patient was in CCU for continued monitoring. She temporarily required Levophed for BP maintenance. Ascitic fluid negative for evidence of SBP, no growth on culture. 2 blood cultures were positive for H. influenzae. Patient was seen and assessed by GI team, with no acute intervention. She is off pressors, afebrile, and downgraded to medical floor. She will finish ceftriaxone IV 1/25 per ID. Pt will f/u outpt [facility] under Dr. Dean (gastroenterologist) for continued care and completion of workup for liver transplant.     #Acute Decompensation of Liver Cirrhosis s/p therapeutic paracentesis; Ascites; Portopulmonary HTN; Pancytopenia  -Outpt f/u Dr. Camille Dean at [facility] [phone], on transplant list awaiting completion of cardiac workup. MELD score low 12 and stable, don't need inpt transfer  -Continue ursodiol and lactulose  -Lasix 40 and Aldactone 10 if MAP > 60   -Ascitic fluid negative for evidence of SBP  -Therapeutic paracentesis as needed    #Septic Shock 2/2 H. influenza x 2 Bcx  -ID- Continue Rocephin 2g q24 (End 1/25, called Dr. Blanc)   -F/u repeat blood cultures NGTD    #Pancytopenia 2/2 Cirrhosis, splenic sequestration, and possible bone marrow suppression due to sepsis- chronic  -Heme/onc- recs pamella. Monitor CBC. C/w Lovenox as long as platelet counts are over 25,000 unless risk of variceal bleed is felt to be higher than risk of thrombosis  -Active T&S    #Hypothyroidism  -Continue Synthroid    #Hypotension  -Patient runs in the 80s and 90s systolic. Continue home midodrine.     #Recent L1 Compression Fracture s/p trauma  -Per neurosurgery, no intervention     DVT ppx: Lovenox sub-q  GI ppx: Protonix   Diet: DASH/TLC, fluid restriction < 1.5 L/day   Activity: Ambulate as tolerated  Dispo: home, outpt [facility] f/u  Code status: FULL CODE. 58F PMH liver cirrhosis 2/2 primary biliary cirrhosis (complicated by ascites, portopulmonary HTN and splenomegaly; on transplant list at [facility]), hypotension, and GERD p/w non-radiating epigastric pain, N/V. In ED, found to have fever 103F and hypotension. IVFs and IV broad spectrum antibiotics were initiated, therapeutic paracentesis performed for rule out SBP, and patient was in CCU for continued monitoring. She temporarily required Levophed for BP maintenance. Ascitic fluid negative for evidence of SBP, no growth on culture. 2 blood cultures were positive for H. influenzae. Patient was seen and assessed by GI team, with no acute intervention. She is off pressors, afebrile, and downgraded to medical floor. She will finish ceftriaxone IV 1/25 per ID. Pt will f/u outpt [facility] under Dr. Dean (gastroenterologist) for continued care and completion of workup for liver transplant.     #Acute Decompensation of Liver Cirrhosis s/p therapeutic paracentesis; Ascites; Portopulmonary HTN; Pancytopenia  -Outpt f/u Dr. Camille Dean at [facility] [phone], on transplant list awaiting completion of cardiac workup. MELD score low 12 and stable, don't need inpt transfer  -Continue ursodiol and lactulose  -Lasix 40 and Aldactone 10 if MAP > 60   -Ascitic fluid negative for evidence of SBP  -Therapeutic paracentesis as needed    #Septic Shock 2/2 H. influenza x 2 Bcx  -ID- Continue Rocephin 2g q24 (End 1/25, called Dr. Blanc)   -F/u repeat blood cultures NGTD    #Pancytopenia 2/2 Cirrhosis, splenic sequestration, and possible bone marrow suppression due to sepsis- chronic  -Heme/onc- recs pamella. Monitor CBC. C/w Lovenox as long as platelet counts are over 25,000 unless risk of variceal bleed is felt to be higher than risk of thrombosis  -Active T&S    #Hypothyroidism  -Continue Synthroid    #Hypotension  -Patient runs in the 80s and 90s systolic. Continue home midodrine.     #Recent L1 Compression Fracture s/p trauma  -Per neurosurgery, no intervention     DVT ppx: Lovenox sub-q  GI ppx: Protonix   Diet: DASH/TLC, fluid restriction < 1.5 L/day   Activity: Ambulate as tolerated  Dispo: home, outpt [facility] f/u  Code status: FULL CODE.    Anticipate d/c after 1/25 when ceftriaxone finishes 58F PMH liver cirrhosis 2/2 primary biliary cirrhosis (complicated by ascites, portopulmonary HTN and splenomegaly; on transplant list at [facility]), hypotension, and GERD p/w non-radiating epigastric pain, N/V. In ED, found to have fever 103F and hypotension. IVFs and IV broad spectrum antibiotics were initiated, therapeutic paracentesis performed for rule out SBP, and patient was in CCU for continued monitoring. She temporarily required Levophed for BP maintenance. Ascitic fluid negative for evidence of SBP, no growth on culture. 2 blood cultures were positive for H. influenzae. Patient was seen and assessed by GI team, with no acute intervention. She is off pressors, afebrile, and downgraded to medical floor. She will finish ceftriaxone IV 1/25 per ID. Pt will f/u outpt [facility] under Dr. Dean (gastroenterologist) for continued care and completion of workup for liver transplant.     #Acute Decompensation of Liver Cirrhosis s/p therapeutic paracentesis; Ascites; Portopulmonary HTN; Pancytopenia  -Outpt f/u Dr. Camille Dean at [facility] [phone], on transplant list awaiting completion of cardiac workup. MELD score low 12 and stable, don't need inpt transfer  -Continue ursodiol and lactulose  -Lasix 40 and Aldactone 10 if MAP > 60   -Ascitic fluid negative for evidence of SBP  -Therapeutic paracentesis as needed    #Septic Shock 2/2 H. influenza x 2 Bcx  -ID- Continue Rocephin 2g q24 (End 1/25, called Dr. Blanc)   -F/u repeat blood cultures NGTD    #Pancytopenia 2/2 Cirrhosis, splenic sequestration, and possible bone marrow suppression due to sepsis- chronic  -Heme/onc- recs pamella. Monitor CBC. C/w Lovenox as long as platelet counts are over 25,000 unless risk of variceal bleed is felt to be higher than risk of thrombosis  -Active T&S    #Hypothyroidism  -Continue Synthroid    #Hypotension  -Patient runs in the 80s and 90s systolic. Continue home midodrine.     #Recent L1 Compression Fracture s/p trauma  -Per neurosurgery, no intervention     DVT ppx: Lovenox sub-q  GI ppx: Protonix   Diet: DASH/TLC, fluid restriction < 1.5 L/day   Activity: Ambulate as tolerated  Dispo: home, outpt [facility] f/u  Code status: FULL CODE.    Anticipate d/c after 1/25 when ceftriaxone finishes    Attending Attestation  Pt has been seen and examined. Case and Plan discussed at rounds and agree with above note as corrected.   Pt is a CCU downgrade after being dx with decompensated Primary Biliary Cirrhosis on Transplant Program at [facility]. As of now pt has appointment at [facility] for early February per son. s/p GI evaluation on this admission no intervention. Pt is here for Hemophilus Influenza Bacteremia / Septic Shock improving now off pressors and with negative repeat blood cxs. SBP was ruled out. Per ID to complete IV Ceftriaxone on Friday then can go home. This was discussed with son today at bedside. Pt is [place] citizen speaking.     For all other c/w current care    Dispo: Friday d/c after ceftriaxone full course.

## 2019-01-23 NOTE — PROGRESS NOTE ADULT - SUBJECTIVE AND OBJECTIVE BOX
SUBJECTIVE:    Patient is a 58y old Female who presents with a chief complaint of abdominal pain for 1.5 days (22 Jan 2019 10:14)    Currently admitted to medicine with the primary diagnosis of Chest pain     Today is hospital day 8d. Overnight no event. Denied pain, SOB, urinary/BM issues.    PAST MEDICAL & SURGICAL HISTORY  Splenomegaly  Hypotension  GERD (gastroesophageal reflux disease)  Liver cirrhosis  History of appendectomy  History of tonsillectomy        ALLERGIES:  No Known Allergies    MEDICATIONS:  STANDING MEDICATIONS  cefTRIAXone   IVPB 2 Gram(s) IV Intermittent every 24 hours  chlorhexidine 4% Liquid 1 Application(s) Topical <User Schedule>  enoxaparin Injectable 40 milliGRAM(s) SubCutaneous daily  furosemide    Tablet 40 milliGRAM(s) Oral daily  lactulose Syrup 20 Gram(s) Oral two times a day  levothyroxine 50 MICROGram(s) Oral daily  midodrine 10 milliGRAM(s) Oral three times a day  pantoprazole    Tablet 40 milliGRAM(s) Oral two times a day  spironolactone 12.5 milliGRAM(s) Oral daily  ursodiol Capsule 300 milliGRAM(s) Oral every 8 hours    PRN MEDICATIONS    VITALS:   T(F): 97  HR: 69  BP: 103/57  RR: 18  SpO2: --    LABS:                        7.6    1.72  )-----------( 33       ( 23 Jan 2019 06:17 )             25.1     01-23    138  |  106  |  11  ----------------------------<  109<H>  3.5   |  18  |  0.6<L>    Ca    7.6<L>      23 Jan 2019 06:17  Mg     1.9     01-23    TPro  7.5  /  Alb  2.4<L>  /  TBili  0.9  /  DBili  0.4<H>  /  AST  41  /  ALT  14  /  AlkPhos  91  01-23    PT/INR - ( 23 Jan 2019 06:17 )   PT: 18.10 sec;   INR: 1.58 ratio         PTT - ( 23 Jan 2019 06:17 )  PTT:38.7 sec              RADIOLOGY:      Culture - Blood (collected 19 Jan 2019 17:54)  Source: .Blood None  Preliminary Report (21 Jan 2019 04:00):    No growth to date.    Culture - Blood (collected 19 Jan 2019 12:10)  Source: .Blood Blood-Peripheral  Preliminary Report (20 Jan 2019 17:01):    No growth to date.    Culture - Blood (collected 18 Jan 2019 05:23)  Source: .Blood None  Preliminary Report (19 Jan 2019 13:01):    No growth to date.    Culture - Urine (collected 17 Jan 2019 17:30)  Source: .Urine Clean Catch (Midstream)  Final Report (18 Jan 2019 22:41):    No growth    Culture - Urine (collected 16 Jan 2019 09:50)  Source: .Urine Clean Catch (Midstream)  Final Report (17 Jan 2019 21:56):    No growth    Culture - Blood (collected 15 [place] 2019 16:47)  Source: .Blood None  Final Report (20 Jan 2019 23:00):    No growth at 5 days.    Culture - Body Fluid with Gram Stain (collected 15 [place] 2019 12:56)  Source: .Body Fluid Peritoneal Fluid  Gram Stain (16 Jan 2019 03:00):    polymorphonuclear leukocytes seen    No organisms seen    by cytocentrifuge  Final Report (20 Jan 2019 16:28):    No growth at 5 days    Culture - Blood (collected 15 [place] 2019 07:41)  Source: .Blood Blood-Peripheral  Gram Stain (18 Jan 2019 04:59):    Growth in aerobic bottle: Gram Negative Rods  Final Report (19 Jan 2019 10:45):    Growth in aerobic bottle: Haemophilus influenzae See previous culture    [id number]    Culture - Blood (collected 15 [place] 2019 07:41)  Source: .Blood Blood-Peripheral  Gram Stain (17 Jan 2019 07:15):    Growth in aerobic bottle:    Gram Negative Rods  Final Report (19 Jan 2019 10:16):    Growth in aerobic bottle: Haemophilus influenzae    ***Blood Panel PCR results on this specimen are available    approximately 3 hours after the Gram stain result.***    Gram stain, PCR, and/or culture results may not always    correspond due to differencein methodologies.    ************************************************************    This PCR assay was performed using [employer].    The following targets are tested for: Enterococcus,    vancomycin resistant enterococci, Listeria monocytogenes,    coagulase negative staphylococci, S. aureus,    methicillin resistant S. aureus, Streptococcus agalactiae    (Group B), S. pneumoniae, S. pyogenes (Group A),    Acinetobacter baumannii, Enterobacter cloacae, E. coli,    Klebsiella oxytoca, K. pneumoniae, Proteus sp.,    Serratia marcescens, Haemophilus influenzae,    Neisseria meningitidis, Pseudomonas aeruginosa, Candida    albicans, C. glabrata, C krusei, C parapsilosis,    C. tropicalis and the KPC resistance gene.  Organism: Blood Culture PCR  Haemophilus influenzae (19 Jan 2019 10:16)  Organism: Haemophilus influenzae (19 Jan 2019 10:16)  Organism: Blood Culture PCR (19 Jan 2019 10:16)    PHYSICAL EXAM:  GENERAL: No acute distress, well-developed  HEAD: Atraumatic, Normocephalic  EYES: EOMI, PERRLA, conjunctiva and sclera clear  NECK: Supple, no lymphadenopathy, no JVD  CHEST/LUNG: CTAB; No wheezes, rales, or rhonchi  HEART: Regular rate and rhythm; No murmurs, rubs, or gallops  ABDOMEN: Soft, non-tender, mildly distended; normal bowel sounds, no organomegaly  EXTREMITIES: 2+ peripheral pulses b/l, No clubbing, cyanosis, or edema. No asterixis.   NEUROLOGY: A&O x 3, no focal deficits  SKIN: No rashes or lesions

## 2019-01-23 NOTE — DIETITIAN INITIAL EVALUATION ADULT. - PHYSICAL APPEARANCE
well nourished/BMI: 23.6 (64"/138#), UBW: ~140#, denies any recent wt changes. IBW: 120#, skin intact, no edema noted.

## 2019-01-23 NOTE — DIETITIAN INITIAL EVALUATION ADULT. - ORAL INTAKE PTA
Per son pt with excellent appetite/po intake and denies use of regular oral nutrition supplements. NKFA./good

## 2019-01-23 NOTE — DIETITIAN INITIAL EVALUATION ADULT. - OTHER INFO
Pt p/w abdominal pain for 1.5 days. Acute Decompensation of Liver Cirrhosis s/p therapeutic paracentesis; Ascites; Portopulmonary HTN; Pancytopenia--Outpt f/u Dr. Camille Dean at [facility]. Anticipate d/c after 1/25 when ceftriaxone finishes. Reason for Assessment: LOS

## 2019-01-23 NOTE — DIETITIAN INITIAL EVALUATION ADULT. - ENERGY NEEDS
Calorie Needs: 9765-2007 kcal/day (MSJ x 1.2-1.3 AF)  Protein Needs: 63-76 gm/day (1-1.2 gm/kg)  Fluid Needs: 1.5L fluid restriction per LIP

## 2019-01-24 LAB
ALBUMIN SERPL ELPH-MCNC: 2.8 G/DL — LOW (ref 3.5–5.2)
ALP SERPL-CCNC: 102 U/L — SIGNIFICANT CHANGE UP (ref 30–115)
ALT FLD-CCNC: 19 U/L — SIGNIFICANT CHANGE UP (ref 0–41)
ANION GAP SERPL CALC-SCNC: 16 MMOL/L — HIGH (ref 7–14)
APTT BLD: 39.3 SEC — HIGH (ref 27–39.2)
AST SERPL-CCNC: 56 U/L — HIGH (ref 0–41)
BASOPHILS # BLD AUTO: 0.01 K/UL — SIGNIFICANT CHANGE UP (ref 0–0.2)
BASOPHILS NFR BLD AUTO: 0.5 % — SIGNIFICANT CHANGE UP (ref 0–1)
BILIRUB DIRECT SERPL-MCNC: 0.4 MG/DL — HIGH (ref 0–0.2)
BILIRUB INDIRECT FLD-MCNC: 0.7 MG/DL — SIGNIFICANT CHANGE UP (ref 0.2–1.2)
BILIRUB SERPL-MCNC: 1.1 MG/DL — SIGNIFICANT CHANGE UP (ref 0.2–1.2)
BUN SERPL-MCNC: 10 MG/DL — SIGNIFICANT CHANGE UP (ref 10–20)
CALCIUM SERPL-MCNC: 8 MG/DL — LOW (ref 8.5–10.1)
CHLORIDE SERPL-SCNC: 103 MMOL/L — SIGNIFICANT CHANGE UP (ref 98–110)
CO2 SERPL-SCNC: 20 MMOL/L — SIGNIFICANT CHANGE UP (ref 17–32)
CREAT SERPL-MCNC: 0.7 MG/DL — SIGNIFICANT CHANGE UP (ref 0.7–1.5)
CULTURE RESULTS: SIGNIFICANT CHANGE UP
EOSINOPHIL # BLD AUTO: 0.05 K/UL — SIGNIFICANT CHANGE UP (ref 0–0.7)
EOSINOPHIL NFR BLD AUTO: 2.3 % — SIGNIFICANT CHANGE UP (ref 0–8)
GLUCOSE SERPL-MCNC: 86 MG/DL — SIGNIFICANT CHANGE UP (ref 70–99)
HCT VFR BLD CALC: 27.5 % — LOW (ref 37–47)
HGB BLD-MCNC: 8.2 G/DL — LOW (ref 12–16)
IMM GRANULOCYTES NFR BLD AUTO: 0.5 % — HIGH (ref 0.1–0.3)
INR BLD: 1.52 RATIO — HIGH (ref 0.65–1.3)
LYMPHOCYTES # BLD AUTO: 0.67 K/UL — LOW (ref 1.2–3.4)
LYMPHOCYTES # BLD AUTO: 30.2 % — SIGNIFICANT CHANGE UP (ref 20.5–51.1)
MAGNESIUM SERPL-MCNC: 1.8 MG/DL — SIGNIFICANT CHANGE UP (ref 1.8–2.4)
MCHC RBC-ENTMCNC: 27.5 PG — SIGNIFICANT CHANGE UP (ref 27–31)
MCHC RBC-ENTMCNC: 29.8 G/DL — LOW (ref 32–37)
MCV RBC AUTO: 92.3 FL — SIGNIFICANT CHANGE UP (ref 81–99)
MONOCYTES # BLD AUTO: 0.13 K/UL — SIGNIFICANT CHANGE UP (ref 0.1–0.6)
MONOCYTES NFR BLD AUTO: 5.9 % — SIGNIFICANT CHANGE UP (ref 1.7–9.3)
NEUTROPHILS # BLD AUTO: 1.35 K/UL — LOW (ref 1.4–6.5)
NEUTROPHILS NFR BLD AUTO: 60.6 % — SIGNIFICANT CHANGE UP (ref 42.2–75.2)
NRBC # BLD: 0 /100 WBCS — SIGNIFICANT CHANGE UP (ref 0–0)
PLATELET # BLD AUTO: 41 K/UL — LOW (ref 130–400)
POTASSIUM SERPL-MCNC: 4 MMOL/L — SIGNIFICANT CHANGE UP (ref 3.5–5)
POTASSIUM SERPL-SCNC: 4 MMOL/L — SIGNIFICANT CHANGE UP (ref 3.5–5)
PROT SERPL-MCNC: 8.6 G/DL — HIGH (ref 6–8)
PROTHROM AB SERPL-ACNC: 17.4 SEC — HIGH (ref 9.95–12.87)
RBC # BLD: 2.98 M/UL — LOW (ref 4.2–5.4)
RBC # FLD: 21.7 % — HIGH (ref 11.5–14.5)
SODIUM SERPL-SCNC: 139 MMOL/L — SIGNIFICANT CHANGE UP (ref 135–146)
SPECIMEN SOURCE: SIGNIFICANT CHANGE UP
WBC # BLD: 2.22 K/UL — LOW (ref 4.8–10.8)
WBC # FLD AUTO: 2.22 K/UL — LOW (ref 4.8–10.8)

## 2019-01-24 RX ORDER — LIDOCAINE 4 G/100G
2 CREAM TOPICAL DAILY
Qty: 0 | Refills: 0 | Status: DISCONTINUED | OUTPATIENT
Start: 2019-01-24 | End: 2019-01-25

## 2019-01-24 RX ADMIN — PANTOPRAZOLE SODIUM 40 MILLIGRAM(S): 20 TABLET, DELAYED RELEASE ORAL at 05:08

## 2019-01-24 RX ADMIN — URSODIOL 300 MILLIGRAM(S): 250 TABLET, FILM COATED ORAL at 05:09

## 2019-01-24 RX ADMIN — LACTULOSE 20 GRAM(S): 10 SOLUTION ORAL at 05:09

## 2019-01-24 RX ADMIN — LIDOCAINE 2 PATCH: 4 CREAM TOPICAL at 20:23

## 2019-01-24 RX ADMIN — LIDOCAINE 2 PATCH: 4 CREAM TOPICAL at 13:57

## 2019-01-24 RX ADMIN — ENOXAPARIN SODIUM 40 MILLIGRAM(S): 100 INJECTION SUBCUTANEOUS at 11:14

## 2019-01-24 RX ADMIN — LACTULOSE 20 GRAM(S): 10 SOLUTION ORAL at 17:52

## 2019-01-24 RX ADMIN — Medication 40 MILLIGRAM(S): at 05:09

## 2019-01-24 RX ADMIN — SPIRONOLACTONE 12.5 MILLIGRAM(S): 25 TABLET, FILM COATED ORAL at 05:08

## 2019-01-24 RX ADMIN — MIDODRINE HYDROCHLORIDE 10 MILLIGRAM(S): 2.5 TABLET ORAL at 05:09

## 2019-01-24 RX ADMIN — Medication 50 MICROGRAM(S): at 05:09

## 2019-01-24 RX ADMIN — PANTOPRAZOLE SODIUM 40 MILLIGRAM(S): 20 TABLET, DELAYED RELEASE ORAL at 17:52

## 2019-01-24 RX ADMIN — MIDODRINE HYDROCHLORIDE 10 MILLIGRAM(S): 2.5 TABLET ORAL at 21:17

## 2019-01-24 RX ADMIN — CEFTRIAXONE 100 GRAM(S): 500 INJECTION, POWDER, FOR SOLUTION INTRAMUSCULAR; INTRAVENOUS at 11:13

## 2019-01-24 RX ADMIN — URSODIOL 300 MILLIGRAM(S): 250 TABLET, FILM COATED ORAL at 21:17

## 2019-01-24 RX ADMIN — URSODIOL 300 MILLIGRAM(S): 250 TABLET, FILM COATED ORAL at 13:57

## 2019-01-24 RX ADMIN — MIDODRINE HYDROCHLORIDE 10 MILLIGRAM(S): 2.5 TABLET ORAL at 13:57

## 2019-01-24 NOTE — PROGRESS NOTE ADULT - PROVIDER SPECIALTY LIST ADULT
CCU
CCU
Critical Care
Gastroenterology
Hospitalist
Infectious Disease
Infectious Disease
Internal Medicine
Nephrology
Internal Medicine
CCU
Critical Care
Hospitalist
Infectious Disease
Infectious Disease

## 2019-01-24 NOTE — PROGRESS NOTE ADULT - REASON FOR ADMISSION
abdominal pain for 1.5 days
Abdominal pain
abdominal pain for 1.5 days
abdominal pain for 1.5 days
Decompensation of Cirrhosis
abdominal pain for 1.5 days

## 2019-01-24 NOTE — PROGRESS NOTE ADULT - SUBJECTIVE AND OBJECTIVE BOX
SUBJECTIVE:    Patient is a 58y old Female who presents with a chief complaint of abdominal pain for 1.5 days (23 Jan 2019 09:10)    Currently admitted to medicine with the primary diagnosis of Chest pain     Today is hospital day 9d. Overnight no event. Pt report mild R flank and pelvis pain. Denied pain elsewhere, SOB, urinary/BM issues. Ambulating    PAST MEDICAL & SURGICAL HISTORY  Splenomegaly  Hypotension  GERD (gastroesophageal reflux disease)  Liver cirrhosis  History of appendectomy  History of tonsillectomy        ALLERGIES:  No Known Allergies    MEDICATIONS:  STANDING MEDICATIONS  cefTRIAXone   IVPB 2 Gram(s) IV Intermittent every 24 hours  chlorhexidine 4% Liquid 1 Application(s) Topical <User Schedule>  enoxaparin Injectable 40 milliGRAM(s) SubCutaneous daily  furosemide    Tablet 40 milliGRAM(s) Oral daily  lactulose Syrup 20 Gram(s) Oral two times a day  levothyroxine 50 MICROGram(s) Oral daily  midodrine 10 milliGRAM(s) Oral three times a day  pantoprazole    Tablet 40 milliGRAM(s) Oral two times a day  spironolactone 12.5 milliGRAM(s) Oral daily  ursodiol Capsule 300 milliGRAM(s) Oral every 8 hours    PRN MEDICATIONS  lidocaine   Patch 2 Patch Transdermal daily PRN    VITALS:   T(F): 97.6  HR: 75  BP: 104/57  RR: 18  SpO2: --    LABS:                        8.2    2.22  )-----------( 41       ( 24 Jan 2019 08:22 )             27.5     01-24    139  |  103  |  10  ----------------------------<  86  4.0   |  20  |  0.7    Ca    8.0<L>      24 Jan 2019 08:22  Mg     1.8     01-24    TPro  8.6<H>  /  Alb  2.8<L>  /  TBili  1.1  /  DBili  0.4<H>  /  AST  56<H>  /  ALT  19  /  AlkPhos  102  01-24    PT/INR - ( 24 Jan 2019 08:22 )   PT: 17.40 sec;   INR: 1.52 ratio         PTT - ( 24 Jan 2019 08:22 )  PTT:39.3 sec        Culture - Blood (collected 19 Jan 2019 17:54)  Source: .Blood None  Preliminary Report (21 Jan 2019 04:00):    No growth to date.    Culture - Blood (collected 19 Jan 2019 12:10)  Source: .Blood Blood-Peripheral  Preliminary Report (20 Jan 2019 17:01):    No growth to date.    Culture - Blood (collected 18 Jan 2019 05:23)  Source: .Blood None  Final Report (23 Jan 2019 13:00):    No growth at 5 days.    Culture - Urine (collected 17 Jan 2019 17:30)  Source: .Urine Clean Catch (Midstream)  Final Report (18 Jan 2019 22:41):    No growth    Culture - Urine (collected 16 Jan 2019 09:50)  Source: .Urine Clean Catch (Midstream)  Final Report (17 Jan 2019 21:56):    No growth    Culture - Blood (collected 15 [place] 2019 16:47)  Source: .Blood None  Final Report (20 Jan 2019 23:00):    No growth at 5 days.    Culture - Body Fluid with Gram Stain (collected 15 [place] 2019 12:56)  Source: .Body Fluid Peritoneal Fluid  Gram Stain (16 Jan 2019 03:00):    polymorphonuclear leukocytes seen    No organisms seen    by cytocentrifuge  Final Report (20 Jan 2019 16:28):    No growth at 5 days    Culture - Blood (collected 15 [place] 2019 07:41)  Source: .Blood Blood-Peripheral  Gram Stain (18 Jan 2019 04:59):    Growth in aerobic bottle: Gram Negative Rods  Final Report (19 Jan 2019 10:45):    Growth in aerobic bottle: Haemophilus influenzae See previous culture    [id number]    Culture - Blood (collected 15 [place] 2019 07:41)  Source: .Blood Blood-Peripheral  Gram Stain (17 Jan 2019 07:15):    Growth in aerobic bottle:    Gram Negative Rods  Final Report (19 Jan 2019 10:16):    Growth in aerobic bottle: Haemophilus influenzae    ***Blood Panel PCR results on this specimen are available    approximately 3 hours after the Gram stain result.***    Gram stain, PCR, and/or culture results may not always    correspond due to differencein methodologies.    ************************************************************    This PCR assay was performed using [employer].    The following targets are tested for: Enterococcus,    vancomycin resistant enterococci, Listeria monocytogenes,    coagulase negative staphylococci, S. aureus,    methicillin resistant S. aureus, Streptococcus agalactiae    (Group B), S. pneumoniae, S. pyogenes (Group A),    Acinetobacter baumannii, Enterobacter cloacae, E. coli,    Klebsiella oxytoca, K. pneumoniae, Proteus sp.,    Serratia marcescens, Haemophilus influenzae,    Neisseria meningitidis, Pseudomonas aeruginosa, Candida    albicans, C. glabrata, C krusei, C parapsilosis,    C. tropicalis and the KPC resistance gene.  Organism: Blood Culture PCR  Haemophilus influenzae (19 Jan 2019 10:16)  Organism: Haemophilus influenzae (19 Jan 2019 10:16)  Organism: Blood Culture PCR (19 Jan 2019 10:16)            RADIOLOGY:    PHYSICAL EXAM:  GENERAL: No acute distress, well-developed  HEAD: Atraumatic, Normocephalic  EYES: EOMI, PERRLA, conjunctiva and sclera clear  NECK: Supple, no lymphadenopathy, no JVD  CHEST/LUNG: CTAB; No wheezes, rales, or rhonchi  HEART: Regular rate and rhythm; No murmurs, rubs, or gallops  ABDOMEN: Soft, non-tender, mildly distended; normal bowel sounds, no organomegaly. R flank and pelvis mildly tender to palpation.  EXTREMITIES: 2+ peripheral pulses b/l, No clubbing, cyanosis, or edema. No asterixis.   NEUROLOGY: A&O x 3, no focal deficits  SKIN: No rashes or lesions SUBJECTIVE:    Patient is a 58y old Female who presents with a chief complaint of abdominal pain for 1.5 days (23 Jan 2019 09:10)    Currently admitted to medicine with the primary diagnosis of Chest pain     Today is hospital day 9d. Overnight no event. Pt report mild R flank and pelvis pain. Denied pain elsewhere, SOB, urinary/BM issues. Ambulating    PAST MEDICAL & SURGICAL HISTORY  Splenomegaly  Hypotension  GERD (gastroesophageal reflux disease)  Liver cirrhosis  History of appendectomy  History of tonsillectomy        ALLERGIES:  No Known Allergies    MEDICATIONS:  STANDING MEDICATIONS  cefTRIAXone   IVPB 2 Gram(s) IV Intermittent every 24 hours  chlorhexidine 4% Liquid 1 Application(s) Topical <User Schedule>  enoxaparin Injectable 40 milliGRAM(s) SubCutaneous daily  furosemide    Tablet 40 milliGRAM(s) Oral daily  lactulose Syrup 20 Gram(s) Oral two times a day  levothyroxine 50 MICROGram(s) Oral daily  midodrine 10 milliGRAM(s) Oral three times a day  pantoprazole    Tablet 40 milliGRAM(s) Oral two times a day  spironolactone 12.5 milliGRAM(s) Oral daily  ursodiol Capsule 300 milliGRAM(s) Oral every 8 hours    PRN MEDICATIONS  lidocaine   Patch 2 Patch Transdermal daily PRN    VITALS:   T(F): 97.6  HR: 75  BP: 104/57  RR: 18      LABS:                        8.2    2.22  )-----------( 41       ( 24 Jan 2019 08:22 )             27.5     01-24    139  |  103  |  10  ----------------------------<  86  4.0   |  20  |  0.7    Ca    8.0<L>      24 Jan 2019 08:22  Mg     1.8     01-24    TPro  8.6<H>  /  Alb  2.8<L>  /  TBili  1.1  /  DBili  0.4<H>  /  AST  56<H>  /  ALT  19  /  AlkPhos  102  01-24    PT/INR - ( 24 Jan 2019 08:22 )   PT: 17.40 sec;   INR: 1.52 ratio         PTT - ( 24 Jan 2019 08:22 )  PTT:39.3 sec        Culture - Blood (collected 19 Jan 2019 17:54)  Source: .Blood None  Preliminary Report (21 Jan 2019 04:00):    No growth to date.    Culture - Blood (collected 19 Jan 2019 12:10)  Source: .Blood Blood-Peripheral  Preliminary Report (20 Jan 2019 17:01):    No growth to date.    Culture - Blood (collected 18 Jan 2019 05:23)  Source: .Blood None  Final Report (23 Jan 2019 13:00):    No growth at 5 days.    Culture - Urine (collected 17 Jan 2019 17:30)  Source: .Urine Clean Catch (Midstream)  Final Report (18 Jan 2019 22:41):    No growth    Culture - Urine (collected 16 Jan 2019 09:50)  Source: .Urine Clean Catch (Midstream)  Final Report (17 Jan 2019 21:56):    No growth    Culture - Blood (collected 15 [place] 2019 16:47)  Source: .Blood None  Final Report (20 Jan 2019 23:00):    No growth at 5 days.    Culture - Body Fluid with Gram Stain (collected 15 [place] 2019 12:56)  Source: .Body Fluid Peritoneal Fluid  Gram Stain (16 Jan 2019 03:00):    polymorphonuclear leukocytes seen    No organisms seen    by cytocentrifuge  Final Report (20 Jan 2019 16:28):    No growth at 5 days    Culture - Blood (collected 15 [place] 2019 07:41)  Source: .Blood Blood-Peripheral  Gram Stain (18 Jan 2019 04:59):    Growth in aerobic bottle: Gram Negative Rods  Final Report (19 Jan 2019 10:45):    Growth in aerobic bottle: Haemophilus influenzae See previous culture    [id number]    Culture - Blood (collected 15 [place] 2019 07:41)  Source: .Blood Blood-Peripheral  Gram Stain (17 Jan 2019 07:15):    Growth in aerobic bottle:    Gram Negative Rods  Final Report (19 Jan 2019 10:16):    Growth in aerobic bottle: Haemophilus influenzae    ***Blood Panel PCR results on this specimen are available    approximately 3 hours after the Gram stain result.***    Gram stain, PCR, and/or culture results may not always    correspond due to differencein methodologies.    ************************************************************    This PCR assay was performed using [employer].    The following targets are tested for: Enterococcus,    vancomycin resistant enterococci, Listeria monocytogenes,    coagulase negative staphylococci, S. aureus,    methicillin resistant S. aureus, Streptococcus agalactiae    (Group B), S. pneumoniae, S. pyogenes (Group A),    Acinetobacter baumannii, Enterobacter cloacae, E. coli,    Klebsiella oxytoca, K. pneumoniae, Proteus sp.,    Serratia marcescens, Haemophilus influenzae,    Neisseria meningitidis, Pseudomonas aeruginosa, Candida    albicans, C. glabrata, C krusei, C parapsilosis,    C. tropicalis and the KPC resistance gene.  Organism: Blood Culture PCR  Haemophilus influenzae (19 Jan 2019 10:16)  Organism: Haemophilus influenzae (19 Jan 2019 10:16)  Organism: Blood Culture PCR (19 Jan 2019 10:16)      RADIOLOGY:    PHYSICAL EXAM:  GENERAL: No acute distress, well-developed  HEAD: Atraumatic, Normocephalic  EYES: EOMI, PERRLA, conjunctiva and sclera clear  NECK: Supple, no lymphadenopathy, no JVD  CHEST/LUNG: CTAB; No wheezes, rales, or rhonchi  HEART: Regular rate and rhythm; No murmurs, rubs, or gallops  ABDOMEN: Soft, non-tender, mildly distended; normal bowel sounds, no organomegaly. R flank and pelvis mildly tender to palpation.  EXTREMITIES: 2+ peripheral pulses b/l, No clubbing, cyanosis, or edema. No asterixis.   NEUROLOGY: A&O x 3, no focal deficits  SKIN: No rashes or lesions

## 2019-01-24 NOTE — PROGRESS NOTE ADULT - ASSESSMENT
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 In ED, found to have fever 103F and hypotension. IVFs and IV broad spectrum antibiotics were initiated, therapeutic paracentesis neg for SBP, and patient was in CCU for continued monitoring. She temporarily required Levophed for BP maintenance. 2 Bcx + H. influenzae. Per GI team, no acute intervention. Off pressors, afebrile, and downgraded to medical floor. She will finish ceftriaxone IV 1/25 per ID. Pt will f/u outpt [facility] under Dr. Dean (gastroenterologist) for continued care and complete workup for liver transplant.     #Acute Decompensation of Liver Cirrhosis s/p therapeutic paracentesis; Ascites; Portopulmonary HTN; Pancytopenia- stable  -Outpt f/u Dr. Camille Dean at [facility] [phone], on transplant list awaiting completion of cardiac workup. MELD score low 12 and stable, don't need inpt transfer  -Continue ursodiol and lactulose  -Lasix 40 and Aldactone 10 if MAP > 60   -Ascitic fluid negative for evidence of SBP  -Therapeutic paracentesis as needed    #Septic Shock 2/2 H. influenza x 2 Bcx- resolving  -ID- Continue Rocephin 2g q24 (End 1/25, called Dr. Blanc)   -F/u repeat blood cultures NGTD  -BP stable    #Pancytopenia 2/2 Cirrhosis, splenic sequestration, and possible bone marrow suppression due to sepsis- chronic  -Heme/onc- recs pamella. Monitor CBC. C/w Lovenox as long as platelet counts are over 25,000 unless risk of variceal bleed is felt to be higher than risk of thrombosis  -Active T&S    #R flank and hip pain  -XR hip and lumbosacral f/u    #Hypothyroidism  -Continue Synthroid    #Hypotension  -Patient runs in the 80s and 90s systolic. Continue home midodrine.     #Recent L1 Compression Fracture s/p trauma  -Per neurosurgery, no intervention     DVT ppx: Lovenox sub-q  GI ppx: Protonix   Diet: DASH/TLC, fluid restriction < 1.5 L/day   Activity: Ambulate as tolerated  Dispo: home, outpt [facility] f/u  Code status: FULL CODE.    Anticipate d/c after 1/25 when ceftriaxone finishes 58F PMH liver cirrhosis 2/2 primary biliary cirrhosis (complicated by ascites, portopulmonary HTN and splenomegaly; on transplant list at [facility]), hypotension, and GERD p/w non-radiating epigastric pain, N/V. In ED, found to have fever 103F and hypotension. IVFs and IV broad spectrum antibiotics were initiated, therapeutic paracentesis neg for SBP, and patient was in CCU for continued monitoring. She temporarily required Levophed for BP maintenance. 2 Bcx + H. influenzae. Per GI team, no acute intervention. Off pressors, afebrile, and downgraded to medical floor. She will finish ceftriaxone IV 1/25 per ID. Pt will f/u outpt [facility] under Dr. Dean (gastroenterologist) for continued care and complete workup for liver transplant.     #Acute Decompensation of Liver Cirrhosis s/p therapeutic paracentesis; Ascites; Portopulmonary HTN; Pancytopenia- stable  -Outpt f/u Dr. Camille Dean at [facility] [phone], on transplant list awaiting completion of cardiac workup. MELD score low 12 and stable, don't need inpt transfer  -Continue ursodiol and lactulose  -Lasix 40 and Aldactone 10 if MAP > 60   -Ascitic fluid negative for evidence of SBP  -Therapeutic paracentesis as needed    #Septic Shock 2/2 H. influenza x 2 Bcx- resolving  -ID- Continue Rocephin 2g q24 (End 1/25, called Dr. Blanc)   -F/u repeat blood cultures NGTD  -BP stable    #Pancytopenia 2/2 Cirrhosis, splenic sequestration, and possible bone marrow suppression due to sepsis- chronic  -Heme/onc- recs pamella. Monitor CBC. C/w Lovenox as long as platelet counts are over 25,000 unless risk of variceal bleed is felt to be higher than risk of thrombosis  -Active T&S    #R flank and hip pain  -XR hip and lumbosacral f/u    #Hypothyroidism  -Continue Synthroid    #Hypotension  -Patient runs in the 80s and 90s systolic. Continue home midodrine.     #Recent L1 Compression Fracture s/p trauma  -Per neurosurgery, no intervention     DVT ppx: Lovenox sub-q  GI ppx: Protonix   Diet: DASH/TLC, fluid restriction < 1.5 L/day   Activity: Ambulate as tolerated  Dispo: home, outpt [facility] f/u  Code status: FULL CODE.    Anticipate d/c after 1/25 when ceftriaxone finishes    Attending Attestation  Pt has been seen and examined. Case and Plan discussed at rounds. Pt was complaining about Right sided back pain. No trauma to the area or fall. On exam no radiculopathy. No abdominal tenderness or flank tenderness or suprapubic tenderness. Will Check Xrays of L/S Spine and Hip on the Right Side. Continue Ceftrixone to complete course tomorrow for H/Influenza Bacteremia/Sepsis that's resolved. For Idiopathic Primary Biliary Cirrhosis f/u at [facility] at set appointment.     Dispo: f/u Xrays / Anticipate

## 2019-01-25 ENCOUNTER — TRANSCRIPTION ENCOUNTER (OUTPATIENT)
Age: 59
End: 2019-01-25

## 2019-01-25 VITALS
HEART RATE: 70 BPM | RESPIRATION RATE: 18 BRPM | DIASTOLIC BLOOD PRESSURE: 58 MMHG | TEMPERATURE: 98 F | SYSTOLIC BLOOD PRESSURE: 120 MMHG

## 2019-01-25 LAB
CULTURE RESULTS: SIGNIFICANT CHANGE UP
SPECIMEN SOURCE: SIGNIFICANT CHANGE UP

## 2019-01-25 RX ORDER — SPIRONOLACTONE 25 MG/1
0.5 TABLET, FILM COATED ORAL
Qty: 15 | Refills: 0 | OUTPATIENT
Start: 2019-01-25 | End: 2019-02-23

## 2019-01-25 RX ORDER — FUROSEMIDE 40 MG
1 TABLET ORAL
Qty: 0 | Refills: 0 | COMMUNITY
Start: 2019-01-25

## 2019-01-25 RX ADMIN — MIDODRINE HYDROCHLORIDE 10 MILLIGRAM(S): 2.5 TABLET ORAL at 14:36

## 2019-01-25 RX ADMIN — ENOXAPARIN SODIUM 40 MILLIGRAM(S): 100 INJECTION SUBCUTANEOUS at 12:04

## 2019-01-25 RX ADMIN — SPIRONOLACTONE 12.5 MILLIGRAM(S): 25 TABLET, FILM COATED ORAL at 06:32

## 2019-01-25 RX ADMIN — LACTULOSE 20 GRAM(S): 10 SOLUTION ORAL at 06:33

## 2019-01-25 RX ADMIN — PANTOPRAZOLE SODIUM 40 MILLIGRAM(S): 20 TABLET, DELAYED RELEASE ORAL at 06:32

## 2019-01-25 RX ADMIN — MIDODRINE HYDROCHLORIDE 10 MILLIGRAM(S): 2.5 TABLET ORAL at 06:32

## 2019-01-25 RX ADMIN — CEFTRIAXONE 100 GRAM(S): 500 INJECTION, POWDER, FOR SOLUTION INTRAMUSCULAR; INTRAVENOUS at 12:04

## 2019-01-25 RX ADMIN — Medication 40 MILLIGRAM(S): at 06:32

## 2019-01-25 RX ADMIN — URSODIOL 300 MILLIGRAM(S): 250 TABLET, FILM COATED ORAL at 06:33

## 2019-01-25 RX ADMIN — URSODIOL 300 MILLIGRAM(S): 250 TABLET, FILM COATED ORAL at 14:36

## 2019-01-25 RX ADMIN — Medication 50 MICROGRAM(S): at 06:32

## 2019-01-25 NOTE — DISCHARGE NOTE ADULT - PLAN OF CARE
Follow up with [place] You must follow up with [place] for your liver cirrhosis requiring transplant eventually.  Dr. Camille Dean at [facility] [phone] Follow up with [place] for your liver cirrhosis requiring transplant eventually, Dr. Camille Dean at [facility] [phone].  You had a paracentesis and were treated with ursodiol, lactulose, lasix and spirolactone. Lasix was increased from 20 to 40 mg daily. Spironolactone was added. Hold lasix and spironolactone if blood pressure systolic <90 or dizzy. Follow up with Dr. Dean to adjust medications for cirrhosis.  Return to emergency room if worsening symptoms. Treat You finished your course of ceftriaxone for bacteria Haemophilus influenzae in your blood.   Follow up primary care Dr. Rosas in 1 week. Monitor Wear TLSO brace for comfort only. If pain persists and cannot tolerate, can consider Kyphoplasty.   Follow up with neurosurgery Dr. Davis as needed.

## 2019-01-25 NOTE — DISCHARGE NOTE ADULT - MEDICATION SUMMARY - MEDICATIONS TO STOP TAKING
I will STOP taking the medications listed below when I get home from the hospital:    oxyCODONE 5 mg oral tablet  -- 1 tab(s) by mouth 1 to 4 times a day, As Needed -Severe Pain (7 - 10) MDD:4

## 2019-01-25 NOTE — DISCHARGE NOTE ADULT - HOSPITAL COURSE
58F PMH liver cirrhosis 2/2 primary biliary cirrhosis (complicated by ascites, portopulmonary HTN and splenomegaly; on transplant list at [facility]), hypotension, and GERD p/w non-radiating epigastric pain, N/V. In ED, found to have fever 103F and hypotension. IVFs and IV broad spectrum antibiotics were initiated, therapeutic paracentesis neg for SBP, and patient was in CCU for continued monitoring. She temporarily required Levophed for BP maintenance. 2 Bcx + H. influenzae. Per GI team, no acute intervention. Off pressors, afebrile, and downgraded to medical floor. She will finish ceftriaxone IV 1/25 per ID. Pt will f/u outpt [facility] under Dr. Dean (gastroenterologist) for continued care and complete workup for liver transplant. 58F PMH liver cirrhosis 2/2 primary biliary cirrhosis (complicated by ascites, portopulmonary HTN and splenomegaly; on transplant list at [facility]), hypotension, and GERD p/w non-radiating epigastric pain, N/V. In ED, found to have fever 103F and hypotension. IVFs and IV broad spectrum antibiotics were initiated, therapeutic paracentesis neg for SBP, and patient was in CCU for continued monitoring. She temporarily required Levophed for BP maintenance. 2 Bcx + H. influenzae, finish ceftriaxone IV 1/25 per ID. Per GI team, no acute intervention. Off pressors, afebrile, and downgraded to medical floor. Pt will f/u outpt [facility] under Dr. Dean (gastroenterologist) for continued care and complete workup for liver transplant. For liver cirrhosis, she was treated with ursodiol, lactulose, lasix and aldactone. Pt stable and will be discharged home. 58F PMH liver cirrhosis 2/2 primary biliary cirrhosis (complicated by ascites, portopulmonary HTN and splenomegaly; on transplant list at [facility]), hypotension, and GERD p/w non-radiating epigastric pain, N/V. In ED, found to have fever 103F and hypotension. IVFs and IV broad spectrum antibiotics were initiated, therapeutic paracentesis neg for SBP, and patient was in CCU for continued monitoring. She temporarily required Levophed for BP maintenance. 2 Bcx + H. influenzae, finish ceftriaxone IV 1/25 per ID. Per GI team, no acute intervention. Off pressors, afebrile, and downgraded to medical floor. Pt will f/u outpt [facility] under Dr. Dean (gastroenterologist) for continued care and complete workup for liver transplant. For liver cirrhosis, she was treated with ursodiol, lactulose, lasix and aldactone. Pt stable and will be discharged home.    Vital Signs Last 24 Hrs  T(C): 36.3 (25 Jan 2019 05:28), Max: 36.3 (25 Jan 2019 05:28)  T(F): 97.4 (25 Jan 2019 05:28), Max: 97.4 (25 Jan 2019 05:28)  HR: 69 (25 Jan 2019 05:28) (69 - 83)  BP: 94/53 (25 Jan 2019 05:28) (94/53 - 106/55)  RR: 18 (25 Jan 2019 05:28) (18 - 18)  SpO2: 97% (24 Jan 2019 22:00) (97% - 97%)    PHYSICAL EXAM:  GENERAL: NAD, well-developed  HEAD:  Atraumatic, Normocephalic  EYES: EOMI, PERRLA, conjunctiva and sclera clear  NECK: Supple, No JVD  CHEST/LUNG: Clear to auscultation bilaterally; No wheeze  HEART: Regular rate and rhythm; No murmurs, rubs, or gallops  ABDOMEN: Soft, Nontender, + mild distention - ascities, Bowel sounds present  EXTREMITIES:  2+ Peripheral Pulses, No clubbing, cyanosis, or edema  PSYCH: AAOx3  NEUROLOGY: non-focal  SKIN: No rashes or lesions    Meds per Med rec

## 2019-01-25 NOTE — DISCHARGE NOTE ADULT - CARE PROVIDER_API CALL
Odette Davis), Neurological Surgery  [address]  [address]  [place] [address]  Phone: [phone]  Fax: [phone]    Camille Dean  Phone: [phone]  Fax: (       -

## 2019-01-25 NOTE — DISCHARGE NOTE ADULT - CARE PLAN
Principal Discharge DX:	Abdominal pain  Goal:	Follow up with [facility]  Assessment and plan of treatment:	You must follow up with [facility] for your liver cirrhosis requiring transplant eventually.  Dr. Camille Dean at [facility] [phone] Principal Discharge DX:	Primary biliary cirrhosis  Goal:	Follow up with [facility]  Assessment and plan of treatment:	Follow up with [facility] for your liver cirrhosis requiring transplant eventually, Dr. Camille Dean at [facility] [phone].  You had a paracentesis and were treated with ursodiol, lactulose, lasix and spirolactone. Lasix was increased from 20 to 40 mg daily. Spironolactone was added. Hold lasix and spironolactone if blood pressure systolic <90 or dizzy. Follow up with Dr. Dean to adjust medications for cirrhosis.  Return to emergency room if worsening symptoms.  Secondary Diagnosis:	Haemophilus influenzae septicemia  Goal:	Treat  Assessment and plan of treatment:	You finished your course of ceftriaxone for bacteria Haemophilus influenzae in your blood.   Follow up primary care Dr. Rosas in 1 week.  Secondary Diagnosis:	Closed compression fracture of first lumbar vertebra, sequela  Goal:	Monitor  Assessment and plan of treatment:	Wear TLSO brace for comfort only. If pain persists and cannot tolerate, can consider Kyphoplasty.   Follow up with neurosurgery Dr. Davis as needed.

## 2019-01-25 NOTE — DISCHARGE NOTE ADULT - REASON FOR ADMISSION
abdominal pain for 1.5 days abdominal pain for 1.5 days 2/2 Septic Shock due to H.Influenza Bacteremia. Decompensated Liver Disease. F/U [facility]

## 2019-01-25 NOTE — DISCHARGE NOTE ADULT - MEDICATION SUMMARY - MEDICATIONS TO CHANGE
I will SWITCH the dose or number of times a day I take the medications listed below when I get home from the hospital:    furosemide 20 mg oral tablet  -- 1 tab(s) by mouth once a day at 12PM

## 2019-01-25 NOTE — DISCHARGE NOTE ADULT - PATIENT PORTAL LINK FT
You can access the [facility] Patient Portal, offered by [facility], by registering with the following website: [email or web address]

## 2019-01-25 NOTE — DISCHARGE NOTE ADULT - MEDICATION SUMMARY - MEDICATIONS TO TAKE
I will START or STAY ON the medications listed below when I get home from the hospital:    spironolactone 25 mg oral tablet  -- 0.5 tab(s) by mouth once a day. Hold if systolic <90 or dizzy  -- Indication: For Cirrhosis    lidocaine 5% topical film  -- Apply on skin to affected area once a day MDD:apply for 12 hours on and 12 hours off  -- Indication: For Pain    furosemide 40 mg oral tablet  -- 1 tab(s) by mouth once a day. Hold if dizzy or systolic <90.  -- Indication: For Cirrhosis    ursodiol 300 mg oral tablet  -- 1 tab(s) by mouth 3 times a day  -- Indication: For Cirrhosis    lactulose 10 g/15 mL oral syrup  -- 5 milliliter(s) by mouth 3 times a day MDD:titrate till 2-3 bowel movements / day  -- Indication: For Cirrhosis    midodrine 10 mg oral tablet  -- 1 tab(s) by mouth 3 times a day  -- Indication: For low blood pressure    levothyroxine 50 mcg (0.05 mg) oral tablet  -- 1 tab(s) by mouth once a day  -- Indication: For Hypothyroid

## 2019-01-26 LAB — CULTURE RESULTS: SIGNIFICANT CHANGE UP

## 2019-01-29 DIAGNOSIS — A41.3 SEPSIS DUE TO HEMOPHILUS INFLUENZAE: ICD-10-CM
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
## 2019-01-29 DIAGNOSIS — R18.8 OTHER ASCITES: ICD-10-CM

## 2019-01-29 DIAGNOSIS — M54.9 DORSALGIA, UNSPECIFIED: ICD-10-CM
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
## 2019-01-29 DIAGNOSIS — I07.1 RHEUMATIC TRICUSPID INSUFFICIENCY: ICD-10-CM

## 2019-01-29 DIAGNOSIS — K76.6 PORTAL HYPERTENSION: ICD-10-CM

## 2019-01-29 DIAGNOSIS — I50.32 CHRONIC DIASTOLIC (CONGESTIVE) HEART FAILURE: ICD-10-CM

## 2019-01-29 DIAGNOSIS — K21.9 GASTRO-ESOPHAGEAL REFLUX DISEASE WITHOUT ESOPHAGITIS: ICD-10-CM

## 2019-01-29 DIAGNOSIS — R16.1 SPLENOMEGALY, NOT ELSEWHERE CLASSIFIED: ICD-10-CM

## 2019-01-29 DIAGNOSIS — S32.019A UNSPECIFIED FRACTURE OF FIRST LUMBAR VERTEBRA, INITIAL ENCOUNTER FOR CLOSED FRACTURE: ICD-10-CM

## 2019-01-29 DIAGNOSIS — I27.20 PULMONARY HYPERTENSION, UNSPECIFIED: ICD-10-CM

## 2019-01-29 DIAGNOSIS — D61.818 OTHER PANCYTOPENIA: ICD-10-CM

## 2019-01-29 DIAGNOSIS — R07.9 CHEST PAIN, UNSPECIFIED: ICD-10-CM

## 2019-01-29 DIAGNOSIS — R65.21 SEVERE SEPSIS WITH SEPTIC SHOCK: ICD-10-CM

## 2019-01-29 DIAGNOSIS — I95.9 HYPOTENSION, UNSPECIFIED: ICD-10-CM

## 2019-01-29 DIAGNOSIS — I37.1 NONRHEUMATIC PULMONARY VALVE INSUFFICIENCY: ICD-10-CM
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
## 2019-01-29 DIAGNOSIS — I45.10 UNSPECIFIED RIGHT BUNDLE-BRANCH BLOCK: ICD-10-CM

## 2019-01-29 DIAGNOSIS — K74.3 PRIMARY BILIARY CIRRHOSIS: ICD-10-CM

## 2019-01-29 DIAGNOSIS — E83.42 HYPOMAGNESEMIA: ICD-10-CM

## 2019-01-29 DIAGNOSIS — E87.2 ACIDOSIS: ICD-10-CM

## 2019-01-29 DIAGNOSIS — E87.4 MIXED DISORDER OF ACID-BASE BALANCE: ICD-10-CM

## 2019-01-29 DIAGNOSIS — E03.9 HYPOTHYROIDISM, UNSPECIFIED: ICD-10-CM

## 2019-01-29 DIAGNOSIS — Z87.891 PERSONAL HISTORY OF NICOTINE DEPENDENCE: ICD-10-CM

## 2019-02-04 ENCOUNTER — APPOINTMENT (OUTPATIENT)
Dept: NEUROSURGERY | Facility: CLINIC | Age: 59
End: 2019-02-04

## 2019-02-25 ENCOUNTER — OUTPATIENT (OUTPATIENT)
Dept: OUTPATIENT SERVICES | Facility: HOSPITAL | Age: 59
LOS: 1 days | Discharge: HOME | End: 2019-02-25

## 2019-02-25 DIAGNOSIS — Z98.890 OTHER SPECIFIED POSTPROCEDURAL STATES: Chronic | ICD-10-CM

## 2019-02-25 DIAGNOSIS — Z12.31 ENCOUNTER FOR SCREENING MAMMOGRAM FOR MALIGNANT NEOPLASM OF BREAST: ICD-10-CM

## 2019-03-24 NOTE — PATIENT PROFILE ADULT - BRADEN ACTIVITY
[FreeTextEntry1] : The is a 45 y/o F with a PMH of uterine leiomyoma, ovarian cyst, TB s/p treatment course, trochanteric bursitis, R ear tinnitus who presents here for an annual check up appointment.  [de-identified] :  Pt reports that she has been experiencing intermittent tinnitus for a year and a half, but it is not associated with nausea/vomiting/hearing loss/dizziness. The tinnitus occurs daily, is worse in the evening but resolves with sleep; nothing makes it better or worse. It typically lasts for about 30 minutes, and self-resolves. Pt reports seeing ENT for evaluation of her tinnitus, for which she was prescribed Flavonoid; the pt reports that she has not taken it yet.  Pt denies other complaints or concerns. (3) walks occasionally

## 2019-08-16 ENCOUNTER — EMERGENCY (EMERGENCY)
Facility: HOSPITAL | Age: 59
LOS: 0 days | Discharge: HOME | End: 2019-08-17
Attending: EMERGENCY MEDICINE | Admitting: EMERGENCY MEDICINE
Payer: MEDICAID

## 2019-08-16 VITALS
DIASTOLIC BLOOD PRESSURE: 60 MMHG | OXYGEN SATURATION: 95 % | RESPIRATION RATE: 20 BRPM | HEART RATE: 74 BPM | SYSTOLIC BLOOD PRESSURE: 120 MMHG | TEMPERATURE: 96 F | HEIGHT: 68 IN | WEIGHT: 162.92 LBS

## 2019-08-16 DIAGNOSIS — Z98.890 OTHER SPECIFIED POSTPROCEDURAL STATES: Chronic | ICD-10-CM

## 2019-08-16 DIAGNOSIS — R18.8 OTHER ASCITES: ICD-10-CM

## 2019-08-16 DIAGNOSIS — R10.9 UNSPECIFIED ABDOMINAL PAIN: ICD-10-CM

## 2019-08-16 DIAGNOSIS — Z90.49 ACQUIRED ABSENCE OF OTHER SPECIFIED PARTS OF DIGESTIVE TRACT: ICD-10-CM

## 2019-08-16 DIAGNOSIS — Z90.09 ACQUIRED ABSENCE OF OTHER PART OF HEAD AND NECK: ICD-10-CM

## 2019-08-16 DIAGNOSIS — R14.0 ABDOMINAL DISTENSION (GASEOUS): ICD-10-CM

## 2019-08-16 PROCEDURE — 99284 EMERGENCY DEPT VISIT MOD MDM: CPT | Mod: 25

## 2019-08-16 PROCEDURE — 49082 ABD PARACENTESIS: CPT

## 2019-08-16 NOTE — ED ADULT TRIAGE NOTE - CHIEF COMPLAINT QUOTE
Pt with history of liver cirrhosis and who is on the liver transplant list at [facility], came c/o abdominal pain and tenderness

## 2019-08-16 NOTE — ED ADULT NURSE NOTE - CHIEF COMPLAINT QUOTE
Pt with history of liver cirrhosis and who is on the liver transplant list at [facility], came c/o abdominal pain and tenderness

## 2019-08-16 NOTE — ED PROVIDER NOTE - NS ED ROS FT
Review of Systems    Constitutional: (-) fever (-) weakness (-) diaphoresis   Eyes: (-) change in vision  (-) eye pain  ENT: (-) sore throat (-) ear ache (-) nasal discharge  Cardiovascular: (-) chest pain  (-) palpitations  Respiratory: (-) SOB (-) cough   GI: (-) abdominal pain (-) N/V (-) diarrhea  Integumentary: (-) rash (-) redness   Neurological:  (-) focal deficit (-) altered mental status

## 2019-08-16 NOTE — ED PROVIDER NOTE - OBJECTIVE STATEMENT
59 yo f pmhx sig for liver cirrhosis pw abdominal swelling that developed gradually over the last few wk, pt reports that abdominal swelling is getting worse. No fevers, no abd pain, no sob, no n/v, no palpitations.    I have reviewed available current nursing and previous documentation of past medical, surgical, family, and/or social history.

## 2019-08-16 NOTE — ED PROVIDER NOTE - PHYSICAL EXAMINATION
Physical Exam    Vital Signs: I have reviewed the initial vital signs.  Constitutional: well-nourished, appears stated age, no acute distress  Cardiovascular: +S1/S2, no murmurs, regular rate, regular rhythm, well-perfused extremities  Respiratory: unlabored respiratory effort, clear to auscultation bilaterally, speaks in full sentences  Gastrointestinal: soft, protuberant, non tender, no guarding, no rebound

## 2019-08-16 NOTE — ED PROVIDER NOTE - ATTENDING CONTRIBUTION TO CARE
pt with esld, 2/2 cirrhosis, on txpt list at [facility], just got off plane from [facility] c/o ab distension.  no pain. no sob. no fever or chills. no n, v, d. last bm today.  pt chronically ill appearing,  no resp distress, laying back on stretcher comfortable. anicter, necks up, ctab, rrr, ab soft, distended, nt, +fluid wave.  no rebound or guarding.  no cvat.  will get labs.

## 2019-08-16 NOTE — ED PROVIDER NOTE - CLINICAL SUMMARY MEDICAL DECISION MAKING FREE TEXT BOX
pt with ascites from ESLD/cirrhosis, labs done, paracentesis done, improved, dc home. has outpt liver f/u in 6 days.

## 2019-08-16 NOTE — ED PROVIDER NOTE - NSFOLLOWUPINSTRUCTIONS_ED_ALL_ED_FT
Paracentesis, Care After    Refer to this sheet in the next few weeks. These instructions provide you with information about caring for yourself after your procedure. Your health care provider may also give you more specific instructions. Your treatment has been planned according to current medical practices, but problems sometimes occur. Call your health care provider if you have any problems or questions after your procedure.    What can I expect after the procedure?  After your procedure, it is common to have a small amount of clear fluid coming from the puncture site.    Follow these instructions at home:  Image   Return to your normal activities as told by your health care provider. Ask your health care provider what activities are safe for you.  Take over-the-counter and prescription medicines only as told by your health care provider.  Do not take baths, swim, or use a hot tub until your health care provider approves.  Follow instructions from your health care provider about:  How to take care of your puncture site.  When and how you should change your bandage (dressing).  When you should remove your dressing.  Check your puncture area every day signs of infection. Watch for:  Redness, swelling, or pain.  Fluid, blood, or pus.  Keep all follow-up visits as told by your health care provider. This is important.  Contact a health care provider if:  You have redness, swelling, or pain at your puncture site.  You start to have more clear fluid coming from your puncture site.  You have blood or pus coming from your puncture site.  You have chills.  You have a fever.  Get help right away if:  You develop chest pain or shortness of breath.  You develop increasing pain, discomfort, or swelling in your abdomen.  You feel dizzy or light-headed or you pass out.  This information is not intended to replace advice given to you by your health care provider. Make sure you discuss any questions you have with your health care provider.    FOLLOW UP WITH YOUR GI DOCTOR AT [facility]

## 2019-08-17 VITALS
TEMPERATURE: 98 F | SYSTOLIC BLOOD PRESSURE: 100 MMHG | DIASTOLIC BLOOD PRESSURE: 61 MMHG | HEART RATE: 74 BPM | OXYGEN SATURATION: 96 % | RESPIRATION RATE: 20 BRPM

## 2019-08-17 LAB
ALBUMIN SERPL ELPH-MCNC: 3 G/DL — LOW (ref 3.5–5.2)
ALP SERPL-CCNC: 90 U/L — SIGNIFICANT CHANGE UP (ref 30–115)
ALT FLD-CCNC: 12 U/L — SIGNIFICANT CHANGE UP (ref 0–41)
ANION GAP SERPL CALC-SCNC: 12 MMOL/L — SIGNIFICANT CHANGE UP (ref 7–14)
APTT BLD: 35.1 SEC — SIGNIFICANT CHANGE UP (ref 27–39.2)
AST SERPL-CCNC: 35 U/L — SIGNIFICANT CHANGE UP (ref 0–41)
BASOPHILS # BLD AUTO: 0.01 K/UL — SIGNIFICANT CHANGE UP (ref 0–0.2)
BASOPHILS NFR BLD AUTO: 0.4 % — SIGNIFICANT CHANGE UP (ref 0–1)
BILIRUB DIRECT SERPL-MCNC: 0.6 MG/DL — HIGH (ref 0–0.2)
BILIRUB INDIRECT FLD-MCNC: 1.1 MG/DL — SIGNIFICANT CHANGE UP (ref 0.2–1.2)
BILIRUB SERPL-MCNC: 1.7 MG/DL — HIGH (ref 0.2–1.2)
BUN SERPL-MCNC: 17 MG/DL — SIGNIFICANT CHANGE UP (ref 10–20)
CALCIUM SERPL-MCNC: 8.2 MG/DL — LOW (ref 8.5–10.1)
CHLORIDE SERPL-SCNC: 107 MMOL/L — SIGNIFICANT CHANGE UP (ref 98–110)
CO2 SERPL-SCNC: 20 MMOL/L — SIGNIFICANT CHANGE UP (ref 17–32)
CREAT SERPL-MCNC: 0.8 MG/DL — SIGNIFICANT CHANGE UP (ref 0.7–1.5)
EOSINOPHIL # BLD AUTO: 0.06 K/UL — SIGNIFICANT CHANGE UP (ref 0–0.7)
EOSINOPHIL NFR BLD AUTO: 2.7 % — SIGNIFICANT CHANGE UP (ref 0–8)
GLUCOSE SERPL-MCNC: 90 MG/DL — SIGNIFICANT CHANGE UP (ref 70–99)
GRAM STN FLD: SIGNIFICANT CHANGE UP
HCT VFR BLD CALC: 30.5 % — LOW (ref 37–47)
HGB BLD-MCNC: 9.3 G/DL — LOW (ref 12–16)
IMM GRANULOCYTES NFR BLD AUTO: 0.4 % — HIGH (ref 0.1–0.3)
INR BLD: 1.57 RATIO — HIGH (ref 0.65–1.3)
LIDOCAIN IGE QN: 68 U/L — HIGH (ref 7–60)
LYMPHOCYTES # BLD AUTO: 0.63 K/UL — LOW (ref 1.2–3.4)
LYMPHOCYTES # BLD AUTO: 28.3 % — SIGNIFICANT CHANGE UP (ref 20.5–51.1)
MCHC RBC-ENTMCNC: 27.6 PG — SIGNIFICANT CHANGE UP (ref 27–31)
MCHC RBC-ENTMCNC: 30.5 G/DL — LOW (ref 32–37)
MCV RBC AUTO: 90.5 FL — SIGNIFICANT CHANGE UP (ref 81–99)
MONOCYTES # BLD AUTO: 0.21 K/UL — SIGNIFICANT CHANGE UP (ref 0.1–0.6)
MONOCYTES NFR BLD AUTO: 9.4 % — HIGH (ref 1.7–9.3)
NEUTROPHILS # BLD AUTO: 1.31 K/UL — LOW (ref 1.4–6.5)
NEUTROPHILS NFR BLD AUTO: 58.8 % — SIGNIFICANT CHANGE UP (ref 42.2–75.2)
NRBC # BLD: 0 /100 WBCS — SIGNIFICANT CHANGE UP (ref 0–0)
PLATELET # BLD AUTO: 38 K/UL — LOW (ref 130–400)
POTASSIUM SERPL-MCNC: 3.2 MMOL/L — LOW (ref 3.5–5)
POTASSIUM SERPL-SCNC: 3.2 MMOL/L — LOW (ref 3.5–5)
PROT SERPL-MCNC: 7.7 G/DL — SIGNIFICANT CHANGE UP (ref 6–8)
PROTHROM AB SERPL-ACNC: 18 SEC — HIGH (ref 9.95–12.87)
RBC # BLD: 3.37 M/UL — LOW (ref 4.2–5.4)
RBC # FLD: 17.8 % — HIGH (ref 11.5–14.5)
SODIUM SERPL-SCNC: 139 MMOL/L — SIGNIFICANT CHANGE UP (ref 135–146)
SPECIMEN SOURCE: SIGNIFICANT CHANGE UP
WBC # BLD: 2.23 K/UL — LOW (ref 4.8–10.8)
WBC # FLD AUTO: 2.23 K/UL — LOW (ref 4.8–10.8)

## 2019-08-22 LAB
CULTURE RESULTS: SIGNIFICANT CHANGE UP
SPECIMEN SOURCE: SIGNIFICANT CHANGE UP

## 2022-06-24 NOTE — PATIENT PROFILE ADULT. - PREVIOUS BLOOD TRANSFUSION?
SW received pt's portion of the drug assistance application back and faxed it, along with MD portion to the [employer] [facility] ( [phone])  Writer will follow up in 2-3 business days if no determination by then.
yes

## 2022-09-02 NOTE — DISCHARGE NOTE ADULT - DISCHARGE DATE
Swer spoke with [employer] rep to provide update on auth status. Swer notified that [facility] request was submitted but not for [facility]. Swer contacted [facility] contact, Summer, regarding the above matter. [facility] to resubmit under appropriate LOC.     Swer confirmed with [employer] rep that [facility] will be able to process request over the weekend. Weekend/Holiday CM staff will need to contact [facility] to check on auth status @ [phone], opt 3 (on call staff will answer).    Swer confirmed with Summer at [facility] that facility will be able to admit patient whenever auth received. Swer will provide hand off to weekend CM to be aware.     Patient updated by phone.    21-Feb-2018

## 2022-11-14 NOTE — CONSULT NOTE ADULT - PROBLEM SELECTOR RECOMMENDATION 2
Anesthesia Volume In Cc: 8 -PBC: On ursodiol. Stable with portal HTN.   -Splenomegaly -PBC/overlap AIH: On ursodiol. Stable with portal HTN.   -Splenomegaly with hypersplenism

## 2022-11-30 NOTE — H&P ADULT - NSTOBACCOSCREENHP_GEN_A_NCS
Left message that Dr Guillermo is no longer with [facility] and future appointments have been cancelled.  Provided office number to return call and schedule with another provider   
My chart message sent  
No

## 2023-02-10 NOTE — PATIENT PROFILE ADULT - NSPROMEDSBROUGHTTOHOSP_GEN_A_NUR
Patient is a 88 year-old-female with history of HLD, breast cancer and GERD presents with L shoulder pain. Will r/o ACS. Labs including trop, ECG, CXR, and xray of LUE. Symptomatic treatment with tylenol and lidocaine patch. no

## 2023-08-31 NOTE — ED ADULT NURSE NOTE - NS ED NURSE ED ASSMT LEARNING IMPAIRMENTS
I attest my time as attending is greater than 50% of the total combined time spent on qualifying patient care activities by the PA/NP and attending. I attest my time as attending is greater than 50% of the total combined time spent on qualifying patient care activities by the PA/NP and attending. I attest my time as attending is greater than 50% of the total combined time spent on qualifying patient care activities by the PA/NP and attending. Language

## 2024-04-29 NOTE — PHYSICAL THERAPY INITIAL EVALUATION ADULT - PERTINENT HX OF CURRENT PROBLEM, REHAB EVAL
Patient is a 58y old  Female who presents with a chief complaint of Syncope. 58 year old female with pertinent medical history of cirrhosis / Primary Biliary Cirrhosis dx 2003 ( positive AMA), chronic Left upper quadrant pain, liver cirrhosis with portal hypertension, ascites, splenomegaly and esophageal varices, thrombocytopenia awaiting liver transplant at [facility]  recently d/c for exacerbation of abdominal pain now presents s/p fall.

## 2024-09-13 NOTE — ED ADULT NURSE NOTE - NS ED NURSE RECORD ANOTHER VITAL SIGN
[FreeTextEntry3] : Procedure Name: l cmc1 Injection: Celestone, Lidocaine, Marcaine, Evaluation and Guidance Ultrasound Injection was performed because of pain and inflammation. Anesthesia: ethyl chloride sprayed topically.  Celestone: 2 cc.  Lidocaine: 2 cc.  Marcaine: 2 cc.  Medication was injected. Patient has tried OTC's including aspirin, Ibuprofen, Aleve etc or prescription NSAIDS, and/or exercises at home and/ or physical therapy without satisfactory response. After verbal consent using sterile preparation and technique. The risks, benefits, and alternatives to cortisone injection were explained in full to the patient. Risks outlined include but are not limited to infection, sepsis, bleeding, scarring, skin discoloration, temporary increase in pain, syncopal episode, failure to resolve symptoms, allergic reaction, symptom recurrence, and elevation of blood sugar in diabetics. Patient understood the risks. All questions were answered. After discussion of options, patient requested an injection. Oral informed consent was obtained and sterile prep was done of the injection site. Sterile technique was utilized for the procedure including the preparation of the solutions used for the injection. Patient tolerated the procedure well. Advised to ice the injection site this evening. Prep with betadine locally to site. Sterile technique used.  Ultrasound guided injection was performed. Visualization of the needle and placement of injection was performed without complication. Yes